# Patient Record
Sex: FEMALE | Race: WHITE | NOT HISPANIC OR LATINO | Employment: OTHER | URBAN - METROPOLITAN AREA
[De-identification: names, ages, dates, MRNs, and addresses within clinical notes are randomized per-mention and may not be internally consistent; named-entity substitution may affect disease eponyms.]

---

## 2018-02-28 PROBLEM — F29 PSYCHOSIS: Status: ACTIVE | Noted: 2018-02-28

## 2018-03-01 PROBLEM — K04.7 DENTAL ABSCESS: Status: ACTIVE | Noted: 2018-03-01

## 2018-03-01 PROBLEM — N39.0 UTI (URINARY TRACT INFECTION): Status: ACTIVE | Noted: 2018-03-01

## 2018-03-07 PROBLEM — I95.1 ORTHOSTATIC HYPOTENSION: Status: ACTIVE | Noted: 2018-03-07

## 2018-04-25 ENCOUNTER — OFFICE VISIT (OUTPATIENT)
Dept: INTERNAL MEDICINE | Facility: CLINIC | Age: 43
End: 2018-04-25
Payer: MEDICARE

## 2018-04-25 VITALS
TEMPERATURE: 99 F | WEIGHT: 167 LBS | BODY MASS INDEX: 28.51 KG/M2 | DIASTOLIC BLOOD PRESSURE: 64 MMHG | SYSTOLIC BLOOD PRESSURE: 100 MMHG | HEIGHT: 64 IN | OXYGEN SATURATION: 95 % | HEART RATE: 96 BPM

## 2018-04-25 DIAGNOSIS — F41.9 ANXIETY AND DEPRESSION: Primary | ICD-10-CM

## 2018-04-25 DIAGNOSIS — F32.A ANXIETY AND DEPRESSION: Primary | ICD-10-CM

## 2018-04-25 PROCEDURE — 99202 OFFICE O/P NEW SF 15 MIN: CPT | Mod: ,,, | Performed by: INTERNAL MEDICINE

## 2018-04-25 RX ORDER — OLANZAPINE 10 MG/1
10 TABLET ORAL NIGHTLY
COMMUNITY
End: 2018-04-25 | Stop reason: ALTCHOICE

## 2018-04-25 RX ORDER — OMEPRAZOLE 40 MG/1
40 CAPSULE, DELAYED RELEASE ORAL DAILY
COMMUNITY
End: 2018-05-08 | Stop reason: ALTCHOICE

## 2018-04-25 RX ORDER — RISPERIDONE 3 MG/1
TABLET ORAL
Refills: 0 | COMMUNITY
Start: 2018-03-21 | End: 2018-04-25 | Stop reason: SDUPTHER

## 2018-04-25 RX ORDER — RISPERIDONE 3 MG/1
3 TABLET ORAL NIGHTLY
Qty: 30 TABLET | Refills: 1 | Status: SHIPPED | OUTPATIENT
Start: 2018-04-25 | End: 2018-05-08 | Stop reason: HOSPADM

## 2018-04-25 RX ORDER — TRAZODONE HYDROCHLORIDE 150 MG/1
150 TABLET ORAL NIGHTLY
COMMUNITY
End: 2018-04-25 | Stop reason: ALTCHOICE

## 2018-04-25 RX ORDER — RISPERIDONE 2 MG/1
2 TABLET ORAL DAILY
Qty: 30 TABLET | Refills: 1 | Status: SHIPPED | OUTPATIENT
Start: 2018-04-25 | End: 2018-05-08 | Stop reason: HOSPADM

## 2018-04-25 RX ORDER — DULOXETIN HYDROCHLORIDE 60 MG/1
60 CAPSULE, DELAYED RELEASE ORAL DAILY
Qty: 30 CAPSULE | Refills: 1 | Status: SHIPPED | OUTPATIENT
Start: 2018-04-25 | End: 2019-04-25

## 2018-04-25 RX ORDER — RISPERIDONE 2 MG/1
TABLET ORAL
Refills: 0 | COMMUNITY
Start: 2018-03-21 | End: 2018-04-25 | Stop reason: SDUPTHER

## 2018-04-25 RX ORDER — CLONAZEPAM 0.5 MG/1
0.5 TABLET ORAL 2 TIMES DAILY PRN
COMMUNITY
End: 2018-04-25 | Stop reason: ALTCHOICE

## 2018-04-25 NOTE — PROGRESS NOTES
Subjective:       Patient ID: Nancy Wild is a 43 y.o. female.    Chief Complaint: Establish Care (new patient establishment); Depression (med refill patient requesting abx for teeth pain); Anxiety; and Insomnia    Here to establish care.  She has a h/o multiple psychiatric diagnoses in the past include schizophrenia, bipolar, anxiety/depression, personality disorder.  She was admitted via [facility] at the end of February after presenting to [facility] requesting refills on klonopin and trazodone and expressing suicidal ideation.  Given a diagnosis of psychosis at that time and discharged with Rx for risperdal and instructions to f/u at [facility].  However, she was in  The middle of a move and lost her paperwork.  She went there yesterday and was given an appointment for next month but told to get refills from PCP until then.  She presents here asking for refills on klonopin and trazodone.  She has bottles with her for zyprexa, omeprazole, risperdone.   She reports she continued to take trazodone 150mg until she ran out 2 days ago.  Her discharge orders from February specifically mention stopping the trazodone and klonopin but make no mention of zyprexa (unclear if they were aware of it).  She denies ever taking any SNRIs but thinks she was on celexa and had a reaction to it.   Denies SI today.  Feels very anxious, can't sit still.  PHQ9 18, JULIET 17.      Review of Systems   Constitutional: Positive for unexpected weight change. Negative for chills, fatigue and fever (weight gain).   HENT: Positive for congestion. Negative for hearing loss, postnasal drip, rhinorrhea, trouble swallowing and voice change.    Eyes: Negative for photophobia and visual disturbance.   Respiratory: Positive for wheezing. Negative for apnea, cough, choking, chest tightness and shortness of breath.    Cardiovascular: Negative for chest pain, palpitations and leg swelling.   Gastrointestinal: Negative for abdominal pain, blood in  stool, constipation, diarrhea, nausea, rectal pain and vomiting.        Acid reflux   Endocrine: Negative for cold intolerance, heat intolerance, polydipsia and polyuria.   Genitourinary: Negative for decreased urine volume, difficulty urinating, dysuria, frequency, genital sores, hematuria, menstrual problem, pelvic pain, urgency, vaginal bleeding and vaginal discharge.   Musculoskeletal: Positive for myalgias, neck pain and neck stiffness. Negative for arthralgias, back pain, gait problem and joint swelling.   Skin: Negative for color change, rash and wound.   Allergic/Immunologic: Negative for environmental allergies and food allergies.   Neurological: Positive for tremors and headaches. Negative for dizziness, seizures, syncope, facial asymmetry, speech difficulty, weakness, light-headedness and numbness.   Hematological: Negative for adenopathy. Does not bruise/bleed easily.   Psychiatric/Behavioral: Positive for agitation, decreased concentration, depression, hallucinations and sleep disturbance (when out of medications). Negative for confusion and suicidal ideas. The patient is nervous/anxious.        Past Medical History:   Diagnosis Date    Anxiety     Bipolar disorder     Borderline personality disorder     Depression     H/O abdominal hysterectomy     H/O foot surgery     Hallucination     Headache     Hx of psychiatric care     Psychiatric exam requested by authority     Psychiatric problem     Schizoaffective disorder     Suicide attempt     Therapy       Past Surgical History:   Procedure Laterality Date     SECTION      HYSTERECTOMY      TUBAL LIGATION         Family History   Problem Relation Age of Onset    Anxiety disorder Sister     Tongue cancer Sister     Bipolar disorder Brother     Stomach cancer Mother     Prostate cancer Father        Social History     Social History    Marital status: Legally      Spouse name: N/A    Number of children: 3    Years  of education: N/A     Occupational History    disabled      Social History Main Topics    Smoking status: Current Every Day Smoker     Packs/day: 0.50     Years: 20.00     Types: Cigarettes    Smokeless tobacco: Never Used    Alcohol use Yes      Comment: occasional    Drug use: Yes     Types: Benzodiazepines      Comment: Pt reports being prescribed klonopin and xanax [place] psychiatrist in [place] (Dr. Campo) - no record of patient in     Sexual activity: Yes     Partners: Male     Birth control/ protection: See Surgical Hx     Other Topics Concern    None     Social History Narrative    Pt was born in [place], where she lived with her parents and twelve brothers and sisters. Pt is fifth oldest. She denies h/o childhood physical, verbal, sexual abuse. She moved to [place] with her  at age 17 (was given special permission by her parents to  that young). She completed hs in [place], and went on to complete some college in [place], and then some college in [place]. She and her  had three children. She and her  legally  about 10 years ago. She moved to [place] 2-3 weeks ago to live with her boyfriend - she did not plan this move very well, as she had no psychiatric medications and had not made plans to establish treatment in [place] before leaving [place] (where she had been seeing an outpatient psychiatrist for 5-6 years). She has had numerous deaths in her family due to cancer, and most recently lost her sister Jennifer (2 months ago). She is currently disabled. 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        Current Outpatient Prescriptions   Medication Sig Dispense Refill    omeprazole (PRILOSEC) 40 MG capsule Take 40 mg by mouth once daily.      DULoxetine (CYMBALTA) 60 MG capsule Take 1 capsule (60 mg total) by mouth once daily. 30 capsule 1    risperiDONE (RISPERDAL) 2 MG tablet Take 1 tablet (2 mg total)  "by mouth once daily. 30 tablet 1    risperiDONE (RISPERDAL) 3 MG Tab Take 1 tablet (3 mg total) by mouth every evening. 30 tablet 1     No current facility-administered medications for this visit.        Review of patient's allergies indicates:   Allergen Reactions    Amoxicillin      Eye and throat swelling    Celexa [citalopram] Other (See Comments)     Facial flushing    Penicillins Swelling     Eye and throat swelling     Clindamycin Other (See Comments)     Eye and throat swelling      Objective:    HPI     Establish Care    Additional comments: new patient establishment           Depression    Additional comments: med refill patient requesting abx for teeth pain       Last edited by Mae Jason MA on 4/25/2018  9:52 AM. (History)      Blood pressure 100/64, pulse 96, temperature 98.5 °F (36.9 °C), temperature source Temporal, height 5' 4" (1.626 m), weight 75.8 kg (167 lb), SpO2 95 %. Body mass index is 28.67 kg/m².   Physical Exam   Constitutional: She appears well-developed. No distress.   HENT:   Nose: Nose normal.   Mouth/Throat: Oropharynx is clear and moist.   Eyes: Conjunctivae are normal. Right eye exhibits no discharge. Left eye exhibits no discharge. No scleral icterus.   Neck: Carotid bruit is not present.   Cardiovascular: Normal rate, regular rhythm and normal heart sounds.    No murmur heard.  Pulmonary/Chest: Effort normal and breath sounds normal. No respiratory distress. She has no decreased breath sounds. She has no wheezes. She has no rhonchi. She has no rales.   Abdominal: Soft. She exhibits no distension. There is no tenderness. There is no rebound and no guarding.   Musculoskeletal: She exhibits no edema.   Neurological: She is alert. She displays no tremor.   Skin: Skin is warm and dry.   Psychiatric: Her speech is normal. Thought content normal. Her mood appears anxious. She is agitated and hyperactive (constantly pacing or otherwise in motion).   Nursing note and vitals " reviewed.          Assessment:       1. Anxiety and depression        Plan:       Nancy was seen today for establish care, depression, anxiety and insomnia.    Diagnoses and all orders for this visit:    Anxiety and depression  Comments:  D/C zyprexa and trazodone.  Will refill risperdal and add cymbalta.  To ER if any suicidal ideation.    Orders:  -     risperiDONE (RISPERDAL) 2 MG tablet; Take 1 tablet (2 mg total) by mouth once daily.  -     risperiDONE (RISPERDAL) 3 MG Tab; Take 1 tablet (3 mg total) by mouth every evening.  -     DULoxetine (CYMBALTA) 60 MG capsule; Take 1 capsule (60 mg total) by mouth once daily.

## 2018-04-26 RX ORDER — CEFDINIR 300 MG/1
300 CAPSULE ORAL 2 TIMES DAILY
Qty: 20 CAPSULE | Refills: 0 | Status: SHIPPED | OUTPATIENT
Start: 2018-04-26 | End: 2018-05-06

## 2018-05-08 ENCOUNTER — OFFICE VISIT (OUTPATIENT)
Dept: INTERNAL MEDICINE | Facility: CLINIC | Age: 43
End: 2018-05-08
Payer: MEDICARE

## 2018-05-08 VITALS
RESPIRATION RATE: 18 BRPM | SYSTOLIC BLOOD PRESSURE: 130 MMHG | DIASTOLIC BLOOD PRESSURE: 84 MMHG | HEIGHT: 64 IN | OXYGEN SATURATION: 96 % | BODY MASS INDEX: 28.75 KG/M2 | WEIGHT: 168.38 LBS | HEART RATE: 90 BPM | TEMPERATURE: 96 F

## 2018-05-08 DIAGNOSIS — S93.412A SPRAIN OF CALCANEOFIBULAR LIGAMENT OF LEFT ANKLE, INITIAL ENCOUNTER: ICD-10-CM

## 2018-05-08 DIAGNOSIS — K21.9 GASTROESOPHAGEAL REFLUX DISEASE WITHOUT ESOPHAGITIS: ICD-10-CM

## 2018-05-08 DIAGNOSIS — T50.905D MEDICATION SIDE EFFECT, SUBSEQUENT ENCOUNTER: Primary | ICD-10-CM

## 2018-05-08 DIAGNOSIS — F41.9 ANXIETY AND DEPRESSION: ICD-10-CM

## 2018-05-08 DIAGNOSIS — F32.A ANXIETY AND DEPRESSION: ICD-10-CM

## 2018-05-08 PROBLEM — F31.9 BIPOLAR DISORDER: Status: ACTIVE | Noted: 2018-05-08

## 2018-05-08 PROBLEM — M25.572 PAIN IN LEFT ANKLE: Status: ACTIVE | Noted: 2018-05-08

## 2018-05-08 PROBLEM — Z87.11 HISTORY OF PEPTIC ULCER DISEASE: Status: ACTIVE | Noted: 2018-05-08

## 2018-05-08 PROBLEM — M79.672 PAIN IN LEFT FOOT: Status: ACTIVE | Noted: 2018-05-08

## 2018-05-08 PROBLEM — M54.50 LOW BACK PAIN: Status: ACTIVE | Noted: 2018-05-08

## 2018-05-08 PROBLEM — K59.09 CHRONIC CONSTIPATION: Status: ACTIVE | Noted: 2018-05-08

## 2018-05-08 PROBLEM — K02.9 DENTAL CARIES, UNSPECIFIED: Status: ACTIVE | Noted: 2018-05-08

## 2018-05-08 PROBLEM — N64.4 MASTODYNIA: Status: ACTIVE | Noted: 2018-05-08

## 2018-05-08 PROCEDURE — 99213 OFFICE O/P EST LOW 20 MIN: CPT | Mod: ,,, | Performed by: INTERNAL MEDICINE

## 2018-05-08 RX ORDER — CLONAZEPAM 0.5 MG/1
1 TABLET ORAL 2 TIMES DAILY
COMMUNITY

## 2018-05-08 RX ORDER — TRAZODONE HYDROCHLORIDE 150 MG/1
150 TABLET ORAL NIGHTLY
COMMUNITY

## 2018-05-08 RX ORDER — OLANZAPINE 10 MG/1
10 TABLET ORAL NIGHTLY
COMMUNITY

## 2018-05-08 RX ORDER — BENZTROPINE MESYLATE 1 MG/1
1 TABLET ORAL 2 TIMES DAILY
Refills: 0 | COMMUNITY
Start: 2018-05-03 | End: 2018-05-08 | Stop reason: SDUPTHER

## 2018-05-08 RX ORDER — DIPHENHYDRAMINE HCL 25 MG
25 CAPSULE ORAL EVERY 6 HOURS PRN
COMMUNITY

## 2018-05-08 RX ORDER — BENZTROPINE MESYLATE 1 MG/1
1 TABLET ORAL 2 TIMES DAILY
Qty: 60 TABLET | Refills: 1 | Status: SHIPPED | OUTPATIENT
Start: 2018-05-08

## 2018-05-08 NOTE — PROGRESS NOTES
Subjective:       Patient ID: Nancy Wild is a 43 y.o. female.    Chief Complaint: Hospital Follow Up (medication side effect) and Foot Pain (left foot)    Here for ER followup.  She reports that she started having some involuntary movements of her arms which she felt was due to the risperdal.  Symptoms would be worse for several hours after taking the medication and seemed better if she skipped a dose.  They gave her benadryl which did seem to help.  Given a Rx for cogentin on discharge but it wasn't made clear to her that she was supposed to stop the risperdal so she has been taking both.  Still having the shaking (actually worse with both meds) but the benadryl does seem to help.   Left ankle has been bothering her last several days.  She reports she stepped off the sidewalk wrong and inverted ankle.  Prior surgery on that ankle.       Review of Systems   Constitutional: Negative for activity change and appetite change.   HENT: Negative for congestion, ear discharge, ear pain, sinus pain and sinus pressure.    Eyes: Positive for visual disturbance. Negative for discharge and itching.   Respiratory: Negative for choking, chest tightness and shortness of breath.    Cardiovascular: Negative for chest pain.   Gastrointestinal: Negative for constipation, diarrhea, nausea and vomiting.   Endocrine: Positive for cold intolerance and heat intolerance.   Genitourinary: Positive for frequency and urgency. Negative for difficulty urinating and pelvic pain.   Musculoskeletal: Positive for joint swelling and neck pain. Negative for back pain and neck stiffness.   Skin: Negative for rash and wound.   Allergic/Immunologic: Positive for environmental allergies. Negative for food allergies.   Neurological: Positive for dizziness. Negative for light-headedness and numbness.   Hematological: Does not bruise/bleed easily.   Psychiatric/Behavioral: Negative for behavioral problems, confusion, decreased concentration,  hallucinations, self-injury and suicidal ideas. The patient is nervous/anxious.        Past Medical History:   Diagnosis Date    Anxiety     Bipolar disorder     Borderline personality disorder     Depression     Gastroesophageal reflux disease without esophagitis 2018    H/O abdominal hysterectomy     H/O foot surgery     Hallucination     Headache     Hx of psychiatric care     Psychiatric exam requested by authority     Psychiatric problem     Schizoaffective disorder     Suicide attempt     Therapy       Past Surgical History:   Procedure Laterality Date     SECTION      HYSTERECTOMY      TUBAL LIGATION         Family History   Problem Relation Age of Onset    Anxiety disorder Sister     Tongue cancer Sister     Bipolar disorder Brother     Stomach cancer Mother     Prostate cancer Father        Social History     Social History    Marital status: Legally      Spouse name: N/A    Number of children: 3    Years of education: N/A     Occupational History    disabled      Social History Main Topics    Smoking status: Current Every Day Smoker     Packs/day: 0.50     Years: 20.00     Types: Cigarettes    Smokeless tobacco: Never Used    Alcohol use Yes      Comment: occasional    Drug use: Yes     Types: Benzodiazepines      Comment: Pt reports being prescribed klonopin and xanax by outpatient psychiatrist in [place] for [place] (Dr. Campo) - no record of patient in     Sexual activity: Yes     Partners: Male     Birth control/ protection: See Surgical Hx     Other Topics Concern    None     Social History Narrative    Pt was born in [place], where she lived with her parents and twelve brothers and sisters. Pt is fifth oldest. She denies h/o childhood physical, verbal, sexual abuse. She moved to [place] with her  at age 17 (was given special permission by her parents to  that young). She completed hs in [place], and went on to complete some  college in [place], and then some college in [place]. She and her  had three children. She and her  legally  about 10 years ago. She moved to [place] 2-3 weeks ago to live with her boyfriend - she did not plan this move very well, as she had no psychiatric medications and had not made plans to establish treatment in [place] before leaving [place] (where she had been seeing an outpatient psychiatrist for 5-6 years). She has had numerous deaths in her family due to cancer, and most recently lost her sister Jennifer (2 months ago). She is currently disabled. 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        Current Outpatient Prescriptions   Medication Sig Dispense Refill    benztropine (COGENTIN) 1 MG tablet Take 1 tablet (1 mg total) by mouth 2 (two) times daily. 60 tablet 1    clonazePAM (KLONOPIN) 0.5 MG tablet Take 1 tablet by mouth 2 (two) times daily.      diphenhydrAMINE (BENADRYL) 25 mg capsule Take 25 mg by mouth every 6 (six) hours as needed for Itching.      DULoxetine (CYMBALTA) 60 MG capsule Take 1 capsule (60 mg total) by mouth once daily. 30 capsule 1    OLANZapine (ZYPREXA) 10 MG tablet Take 10 mg by mouth every evening.      traZODone (DESYREL) 150 MG tablet Take 150 mg by mouth every evening.      ranitidine (ZANTAC) 150 MG tablet Take 1 tablet (150 mg total) by mouth 2 (two) times daily. 60 tablet 3     No current facility-administered medications for this visit.        Review of patient's allergies indicates:   Allergen Reactions    Amoxicillin      Eye and throat swelling    Celexa [citalopram] Other (See Comments)     Facial flushing    Penicillins Swelling     Eye and throat swelling     Clindamycin Other (See Comments)     Eye and throat swelling      Objective:    HPI     Hospital Follow Up    Additional comments: medication side effect           Foot Pain    Additional comments: left foot       Last edited by Corinna Thompson MA  "on 5/8/2018  3:12 PM. (History)      Blood pressure 130/84, pulse 90, temperature (!) 95.8 °F (35.4 °C), temperature source Temporal, resp. rate 18, height 5' 4" (1.626 m), weight 76.4 kg (168 lb 6.4 oz), SpO2 96 %. Body mass index is 28.91 kg/m².   Physical Exam   Cardiovascular: Normal rate, regular rhythm and normal heart sounds.    Pulmonary/Chest: Effort normal and breath sounds normal.   Musculoskeletal:        Left ankle: She exhibits decreased range of motion and swelling. Tenderness. CF ligament tenderness found.   Neurological:   No abnormal motor movements noted at this time     Psychiatric: Her mood appears anxious.           Assessment:       1. Medication side effect, subsequent encounter    2. Anxiety and depression    3. Sprain of calcaneofibular ligament of left ankle, initial encounter    4. Gastroesophageal reflux disease without esophagitis        Plan:       Nancy was seen today for hospital follow up and foot pain.    Diagnoses and all orders for this visit:    Medication side effect, subsequent encounter  Comments:  Stop risperdal and continue cogentin. Benadryl or zyrtec as needed.      Anxiety and depression  Comments:  Has appt with Donn on the 29th  Orders:  -     benztropine (COGENTIN) 1 MG tablet; Take 1 tablet (1 mg total) by mouth 2 (two) times daily.    Sprain of calcaneofibular ligament of left ankle, initial encounter  Comments:  NSAIDs, ankle brace as needed      Gastroesophageal reflux disease without esophagitis  Comments:  Requesting refill on PPI.  Hasn't tried H2 blocker recently so will try that.    Other orders  -     ranitidine (ZANTAC) 150 MG tablet; Take 1 tablet (150 mg total) by mouth 2 (two) times daily.         "

## 2018-11-11 ENCOUNTER — HOSPITAL ENCOUNTER (EMERGENCY)
Facility: HOSPITAL | Age: 43
Discharge: HOME | End: 2018-11-12
Attending: FAMILY MEDICINE
Payer: MEDICARE

## 2018-11-11 VITALS
SYSTOLIC BLOOD PRESSURE: 108 MMHG | OXYGEN SATURATION: 96 % | RESPIRATION RATE: 16 BRPM | BODY MASS INDEX: 27.31 KG/M2 | HEIGHT: 64 IN | WEIGHT: 160 LBS | TEMPERATURE: 99 F | HEART RATE: 104 BPM | DIASTOLIC BLOOD PRESSURE: 84 MMHG

## 2018-11-11 DIAGNOSIS — F31.9 BIPOLAR AFFECTIVE DISORDER, REMISSION STATUS UNSPECIFIED: Primary | ICD-10-CM

## 2018-11-11 DIAGNOSIS — R41.82 ALTERED MENTAL STATUS: ICD-10-CM

## 2018-11-11 PROCEDURE — 71045 X-RAY EXAM CHEST 1 VIEW: CPT | Mod: 26,,, | Performed by: RADIOLOGY

## 2018-11-11 PROCEDURE — 71045 X-RAY EXAM CHEST 1 VIEW: CPT | Mod: TC,FY

## 2018-11-11 PROCEDURE — 85027 COMPLETE CBC AUTOMATED: CPT

## 2018-11-11 PROCEDURE — 80053 COMPREHEN METABOLIC PANEL: CPT

## 2018-11-11 PROCEDURE — 99285 EMERGENCY DEPT VISIT HI MDM: CPT | Mod: 25

## 2018-11-11 PROCEDURE — 85007 BL SMEAR W/DIFF WBC COUNT: CPT

## 2018-11-12 LAB
ALBUMIN SERPL BCP-MCNC: 4.4 G/DL
ALP SERPL-CCNC: 88 U/L
ALT SERPL W/O P-5'-P-CCNC: 19 U/L
AMPHET+METHAMPHET UR QL: NEGATIVE
ANION GAP SERPL CALC-SCNC: 10 MMOL/L
AST SERPL-CCNC: 18 U/L
B-HCG UR QL: NEGATIVE
BACTERIA #/AREA URNS HPF: ABNORMAL /HPF
BARBITURATES UR QL SCN>200 NG/ML: NEGATIVE
BASOPHILS NFR BLD: 0 %
BENZODIAZ UR QL SCN>200 NG/ML: NEGATIVE
BILIRUB SERPL-MCNC: 0.3 MG/DL
BILIRUB UR QL STRIP: ABNORMAL
BUN SERPL-MCNC: 14 MG/DL
BZE UR QL SCN: NEGATIVE
CALCIUM SERPL-MCNC: 9.3 MG/DL
CANNABINOIDS UR QL SCN: NEGATIVE
CHLORIDE SERPL-SCNC: 103 MMOL/L
CLARITY UR: CLEAR
CO2 SERPL-SCNC: 24 MMOL/L
COLOR UR: YELLOW
CREAT SERPL-MCNC: 0.7 MG/DL
CREAT UR-MCNC: 339.8 MG/DL
DIFFERENTIAL METHOD: ABNORMAL
EOSINOPHIL NFR BLD: 2 %
ERYTHROCYTE [DISTWIDTH] IN BLOOD BY AUTOMATED COUNT: 14.6 %
EST. GFR  (AFRICAN AMERICAN): >60 ML/MIN/1.73 M^2
EST. GFR  (NON AFRICAN AMERICAN): >60 ML/MIN/1.73 M^2
GLUCOSE SERPL-MCNC: 109 MG/DL
GLUCOSE UR QL STRIP: NEGATIVE
HCT VFR BLD AUTO: 46.3 %
HGB BLD-MCNC: 15.8 G/DL
HGB UR QL STRIP: NEGATIVE
HYALINE CASTS #/AREA URNS LPF: 0 /LPF
IMM GRANULOCYTES # BLD AUTO: ABNORMAL K/UL
IMM GRANULOCYTES NFR BLD AUTO: ABNORMAL %
KETONES UR QL STRIP: ABNORMAL
LEUKOCYTE ESTERASE UR QL STRIP: NEGATIVE
LYMPHOCYTES NFR BLD: 46 %
MCH RBC QN AUTO: 29.3 PG
MCHC RBC AUTO-ENTMCNC: 34.1 G/DL
MCV RBC AUTO: 86 FL
MICROSCOPIC COMMENT: ABNORMAL
MONOCYTES NFR BLD: 1 %
NEUTROPHILS NFR BLD: 51 %
NITRITE UR QL STRIP: NEGATIVE
NRBC BLD-RTO: 0 /100 WBC
OPIATES UR QL SCN: NEGATIVE
PCP UR QL SCN>25 NG/ML: NEGATIVE
PH UR STRIP: 6 [PH] (ref 5–8)
PLATELET # BLD AUTO: 280 K/UL
PMV BLD AUTO: 9.7 FL
POTASSIUM SERPL-SCNC: 3.2 MMOL/L
PROT SERPL-MCNC: 7.8 G/DL
PROT UR QL STRIP: ABNORMAL
RBC # BLD AUTO: 5.4 M/UL
RBC #/AREA URNS HPF: 3 /HPF (ref 0–4)
SODIUM SERPL-SCNC: 137 MMOL/L
SP GR UR STRIP: 1.02 (ref 1–1.03)
TOXICOLOGY INFORMATION: ABNORMAL
URN SPEC COLLECT METH UR: ABNORMAL
UROBILINOGEN UR STRIP-ACNC: 1 EU/DL
WBC # BLD AUTO: 12.41 K/UL
WBC #/AREA URNS HPF: 3 /HPF (ref 0–5)

## 2018-11-12 PROCEDURE — 81025 URINE PREGNANCY TEST: CPT

## 2018-11-12 PROCEDURE — 80307 DRUG TEST PRSMV CHEM ANLYZR: CPT

## 2018-11-12 PROCEDURE — 81000 URINALYSIS NONAUTO W/SCOPE: CPT | Mod: 59

## 2018-11-12 PROCEDURE — 25000003 PHARM REV CODE 250: Performed by: FAMILY MEDICINE

## 2018-11-12 RX ORDER — POTASSIUM CHLORIDE 20 MEQ/1
20 TABLET, EXTENDED RELEASE ORAL
Status: COMPLETED | OUTPATIENT
Start: 2018-11-12 | End: 2018-11-12

## 2018-11-12 RX ORDER — OLANZAPINE 5 MG/1
5 TABLET, ORALLY DISINTEGRATING ORAL
Status: COMPLETED | OUTPATIENT
Start: 2018-11-12 | End: 2018-11-12

## 2018-11-12 RX ADMIN — OLANZAPINE 5 MG: 5 TABLET, ORALLY DISINTEGRATING ORAL at 02:11

## 2018-11-12 RX ADMIN — POTASSIUM CHLORIDE 20 MEQ: 1500 TABLET, EXTENDED RELEASE ORAL at 02:11

## 2018-11-12 NOTE — ED TRIAGE NOTES
Pt arrived via ems after friends called reported patient was having increased confusion and talking to herself.  Pt has had a poor appetite.  Pt reports non-compliance with previous psychiatric medications.  Pt has a hx of schizoaffective disorder.

## 2018-11-12 NOTE — ED PROVIDER NOTES
Encounter Date: 2018       History     Chief Complaint   Patient presents with    Altered Mental Status     Patient arrived to ED by EMS, people she is staying with reported that she has had a gradual decline in her mental state over the last several days.  Patient does give a history of bipolar disorder for which she is unmedicated.  She denies any SI or HI.  Denies any auditory or visual hallucinations.          Review of patient's allergies indicates:   Allergen Reactions    Amoxicillin      Eye and throat swelling    Celexa [citalopram] Other (See Comments)     Facial flushing    Penicillins Swelling     Eye and throat swelling     Clindamycin Other (See Comments)     Eye and throat swelling      Past Medical History:   Diagnosis Date    Anxiety     Bipolar disorder     Borderline personality disorder     Depression     Gastroesophageal reflux disease without esophagitis 2018    H/O abdominal hysterectomy     H/O foot surgery     Hallucination     Headache     Hx of psychiatric care     Psychiatric exam requested by authority     Psychiatric problem     Schizoaffective disorder     Suicide attempt     Therapy      Past Surgical History:   Procedure Laterality Date     SECTION      HYSTERECTOMY      TUBAL LIGATION       Family History   Problem Relation Age of Onset    Anxiety disorder Sister     Tongue cancer Sister     Bipolar disorder Brother     Stomach cancer Mother     Prostate cancer Father      Social History     Tobacco Use    Smoking status: Current Every Day Smoker     Packs/day: 0.50     Years: 20.00     Pack years: 10.00     Types: Cigarettes    Smokeless tobacco: Never Used   Substance Use Topics    Alcohol use: Yes     Comment: occasional    Drug use: Yes     Types: Benzodiazepines     Comment: Pt reports being prescribed klonopin and xanax by outpatient psychiatrist in [place] (Dr. Campo) - no record of patient in      Review  of Systems   Constitutional: Negative.    HENT: Negative.    Eyes: Negative.    Respiratory: Negative.    Cardiovascular: Negative.    Gastrointestinal: Negative.    Endocrine: Negative.    Genitourinary: Negative.    Musculoskeletal: Negative.    Allergic/Immunologic: Negative.    Neurological: Negative.    Hematological: Negative.    Psychiatric/Behavioral: Positive for behavioral problems and dysphoric mood. Negative for confusion, hallucinations, self-injury, sleep disturbance and suicidal ideas. The patient is not nervous/anxious and is not hyperactive.        Physical Exam     Initial Vitals [11/11/18 2316]   BP Pulse Resp Temp SpO2   108/84 104 16 98.6 °F (37 °C) 96 %      MAP       --         Physical Exam    Nursing note and vitals reviewed.  Constitutional: She appears well-developed and well-nourished. She is not diaphoretic. No distress.   HENT:   Head: Normocephalic and atraumatic.   Eyes: Conjunctivae and EOM are normal. Pupils are equal, round, and reactive to light.   Neck: Normal range of motion. Neck supple.   Cardiovascular: Normal rate, regular rhythm, normal heart sounds and intact distal pulses. Exam reveals no gallop and no friction rub.    No murmur heard.  Pulmonary/Chest: Breath sounds normal. No respiratory distress. She has no wheezes. She has no rales.   Abdominal: Soft. Bowel sounds are normal. She exhibits no distension. There is no tenderness.   Musculoskeletal: Normal range of motion. She exhibits no edema.   Neurological: She is alert and oriented to person, place, and time. She has normal strength.   Skin: Skin is warm and dry. Capillary refill takes less than 2 seconds. No rash noted. No erythema.   Psychiatric: Judgment normal.   Flat affect, monotone speech.         ED Course   Procedures  Labs Reviewed   CBC W/ AUTO DIFFERENTIAL - Abnormal; Notable for the following components:       Result Value    RDW 14.6 (*)     Mono% 1.0 (*)     All other components within normal limits    COMPREHENSIVE METABOLIC PANEL - Abnormal; Notable for the following components:    Potassium 3.2 (*)     All other components within normal limits   PREGNANCY TEST, URINE RAPID   URINALYSIS, REFLEX TO URINE CULTURE   DRUG SCREEN PANEL, URINE EMERGENCY          Imaging Results          X-Ray Chest AP Portable (In process)                                       Clinical Impression:   The primary encounter diagnosis was Bipolar affective disorder, remission status unspecified. A diagnosis of Altered mental status was also pertinent to this visit.                             Marily Ashraf MD  11/12/18 9968

## 2018-11-12 NOTE — DISCHARGE INSTRUCTIONS
Resume medications as previously prescribed, follow up with primary care provider in mental health provider in the next 2-3 days.  Return to the ER at any time if condition changes or worsens.  Return to the ER at any time if he began to have feelings or thoughts of wanting to hurt herself or someone else.

## 2019-05-01 ENCOUNTER — TELEPHONE (OUTPATIENT)
Dept: FAMILY MEDICINE | Facility: CLINIC | Age: 44
End: 2019-05-01

## 2019-05-25 ENCOUNTER — HOSPITAL ENCOUNTER (EMERGENCY)
Age: 44
Discharge: HOME | End: 2019-05-26
Attending: EMERGENCY MEDICINE
Payer: MEDICARE

## 2019-05-25 DIAGNOSIS — R91.1 PULMONARY NODULE, RIGHT: ICD-10-CM

## 2019-05-25 DIAGNOSIS — M54.2 NECK PAIN: Primary | ICD-10-CM

## 2019-05-25 PROCEDURE — 99284 EMERGENCY DEPT VISIT MOD MDM: CPT

## 2019-05-26 ENCOUNTER — APPOINTMENT (OUTPATIENT)
Dept: GENERAL RADIOLOGY | Age: 44
End: 2019-05-26
Payer: MEDICARE

## 2019-05-26 ENCOUNTER — APPOINTMENT (OUTPATIENT)
Dept: CT IMAGING | Age: 44
End: 2019-05-26
Payer: MEDICARE

## 2019-05-26 VITALS
HEART RATE: 90 BPM | TEMPERATURE: 98.2 F | RESPIRATION RATE: 16 BRPM | HEIGHT: 61 IN | SYSTOLIC BLOOD PRESSURE: 105 MMHG | WEIGHT: 162 LBS | OXYGEN SATURATION: 97 % | BODY MASS INDEX: 30.58 KG/M2 | DIASTOLIC BLOOD PRESSURE: 64 MMHG

## 2019-05-26 PROCEDURE — 6370000000 HC RX 637 (ALT 250 FOR IP): Performed by: EMERGENCY MEDICINE

## 2019-05-26 PROCEDURE — 72040 X-RAY EXAM NECK SPINE 2-3 VW: CPT

## 2019-05-26 PROCEDURE — 72125 CT NECK SPINE W/O DYE: CPT

## 2019-05-26 RX ORDER — TRAZODONE HYDROCHLORIDE 150 MG/1
TABLET ORAL
COMMUNITY
End: 2019-08-01 | Stop reason: ALTCHOICE

## 2019-05-26 RX ORDER — IBUPROFEN 600 MG/1
600 TABLET ORAL
COMMUNITY
Start: 2019-05-23 | End: 2019-05-26

## 2019-05-26 RX ORDER — NAPROXEN 500 MG/1
500 TABLET ORAL 2 TIMES DAILY
Qty: 60 TABLET | Refills: 0 | Status: SHIPPED | OUTPATIENT
Start: 2019-05-26 | End: 2019-08-01 | Stop reason: ALTCHOICE

## 2019-05-26 RX ORDER — NAPROXEN 250 MG/1
500 TABLET ORAL ONCE
Status: COMPLETED | OUTPATIENT
Start: 2019-05-26 | End: 2019-05-26

## 2019-05-26 RX ORDER — CLONAZEPAM 0.5 MG/1
TABLET ORAL
COMMUNITY
End: 2019-08-01 | Stop reason: ALTCHOICE

## 2019-05-26 RX ORDER — NAPROXEN 500 MG/1
500 TABLET ORAL 2 TIMES DAILY
Qty: 60 TABLET | Refills: 0 | Status: SHIPPED | OUTPATIENT
Start: 2019-05-26 | End: 2019-05-26 | Stop reason: SDUPTHER

## 2019-05-26 RX ORDER — OMEPRAZOLE 40 MG/1
CAPSULE, DELAYED RELEASE ORAL 2 TIMES DAILY
COMMUNITY
Start: 2017-10-26 | End: 2021-09-23 | Stop reason: SDUPTHER

## 2019-05-26 RX ORDER — OLANZAPINE 10 MG/1
TABLET ORAL
COMMUNITY
End: 2019-05-26

## 2019-05-26 RX ORDER — TIZANIDINE 2 MG/1
2 TABLET ORAL
COMMUNITY
Start: 2019-05-23 | End: 2019-08-01

## 2019-05-26 RX ADMIN — NAPROXEN 500 MG: 250 TABLET ORAL at 01:45

## 2019-05-26 ASSESSMENT — ENCOUNTER SYMPTOMS
RESPIRATORY NEGATIVE: 1
EYES NEGATIVE: 1
GASTROINTESTINAL NEGATIVE: 1

## 2019-05-26 ASSESSMENT — PAIN SCALES - GENERAL
PAINLEVEL_OUTOF10: 7
PAINLEVEL_OUTOF10: 7
PAINLEVEL_OUTOF10: 8
PAINLEVEL_OUTOF10: 8

## 2019-05-26 ASSESSMENT — PAIN DESCRIPTION - ORIENTATION: ORIENTATION: LEFT

## 2019-05-26 ASSESSMENT — PAIN - FUNCTIONAL ASSESSMENT
PAIN_FUNCTIONAL_ASSESSMENT: ACTIVITIES ARE NOT PREVENTED
PAIN_FUNCTIONAL_ASSESSMENT: ACTIVITIES ARE NOT PREVENTED
PAIN_FUNCTIONAL_ASSESSMENT: 0-10
PAIN_FUNCTIONAL_ASSESSMENT: ACTIVITIES ARE NOT PREVENTED

## 2019-05-26 ASSESSMENT — PAIN DESCRIPTION - ONSET
ONSET: ON-GOING

## 2019-05-26 ASSESSMENT — PAIN DESCRIPTION - LOCATION
LOCATION: ANKLE;NECK
LOCATION: NECK;BACK
LOCATION: NECK;BACK

## 2019-05-26 ASSESSMENT — PAIN DESCRIPTION - PAIN TYPE
TYPE: CHRONIC PAIN;ACUTE PAIN
TYPE: CHRONIC PAIN;ACUTE PAIN
TYPE: CHRONIC PAIN

## 2019-05-26 ASSESSMENT — PAIN DESCRIPTION - DESCRIPTORS
DESCRIPTORS: ACHING
DESCRIPTORS: ACHING;DISCOMFORT
DESCRIPTORS: ACHING;DISCOMFORT

## 2019-05-26 ASSESSMENT — PAIN DESCRIPTION - PROGRESSION
CLINICAL_PROGRESSION: GRADUALLY IMPROVING
CLINICAL_PROGRESSION: GRADUALLY IMPROVING

## 2019-05-26 NOTE — ED NOTES
Pt has bilat ankle abrasions posterior from wearing new pair of \"boots\" and walking in them for 3 days. PSO applied with bandages to areas.      Sherly Anderson RN  05/26/19 0014

## 2019-05-26 NOTE — ED NOTES
The patient has many requests. ..she wants a copy of her medical records, she wants a statement of all her medical diagnosis and she wants a cab voucher. The pt was instructed to follow up with medical records and pt was given bus pass.       Miguel Angel Ryder RN  05/26/19 6536

## 2019-05-26 NOTE — ED PROVIDER NOTES
[address] [facility]  eMERGENCY dEPARTMENT eNCOUnter      Pt Name: Anatoliy Holliday  MRN: [id number]  [place] 1975  Date of evaluation: 5/25/2019  Provider: Twan Samaniego MD    CHIEF COMPLAINT       Chief Complaint   Patient presents with    Neck Pain     Pt reportedly seen recently for back pain in [address] [place] [address] [address]. She received muscle relaxers that she did not get filled. The homeless shelter in [place] was then filled so she has been walking about the city. Pt states now the back pain has moved up to her neck and she may have re-injured her left ankle. No swelling noted. Pt does          HISTORY OF PRESENT ILLNESS   (Location/Symptom, Timing/Onset,Context/Setting, Quality, Duration, Modifying Factors, Severity)  Note limiting factors. HPI: Anatoliy Holliday is a 45 y.o. female, with hx of homelessness and psych history, who presents to the emergency department with concern for neck pain and feet pain. Patient states that she has pain at the base of her neck, that is both muscular and skeletal.  Denies any specific trauma but notes pain, focal area of concern, but does note some blisters over her heel. Patient attempted to state home a shelter tonight and was told that she would need medical authorization to do so.  specifically over the C6 vertebrae and with the left paraspinal muscles. She describes it as achy, worse with \"her heavy backpack\" and without any relieving factors. Patient also notes that her feet are generally sore, but denies any specific injury. Patient has some blisters over her heel, but no rash, bruising, or acute injury. Patient attempted to find a homeless shelter tonight, but was told she needed \"medical clearance. \"  Patient has had prior history on her left ankle and she expresses concern for pain there related to [place] of walking. \"  Patient denies any injury and has no change to her gait. NursingNotes were reviewed.     REVIEW OF SYSTEMS    (2-9 systems for level 4, 10 or more for level 5)     Review of Systems   Constitutional: Positive for fatigue. Negative for chills and fever. HENT: Negative. Eyes: Negative. Respiratory: Negative. Cardiovascular: Negative. Gastrointestinal: Negative. Genitourinary: Negative. Musculoskeletal: Positive for arthralgias and myalgias. Negative for gait problem. Skin: Negative. Neurological: Negative. Psychiatric/Behavioral: Negative. Except as noted above the remainder of the review of systems was reviewed and negative. PAST MEDICAL HISTORY     Past Medical History:   Diagnosis Date    CRPS (complex regional pain syndrome type II)          SURGICALHISTORY       Past Surgical History:   Procedure Laterality Date     SECTION           CURRENT MEDICATIONS       Previous Medications    CLONAZEPAM (KLONOPIN) 0.5 MG TABLET    Take by mouth. OMEPRAZOLE (PRILOSEC) 40 MG DELAYED RELEASE CAPSULE    take 1 capsule by oral route every day before a meal    TIZANIDINE (ZANAFLEX) 2 MG TABLET    Take 2 mg by mouth    TRAZODONE (DESYREL) 150 MG TABLET    take 1 tablet by oral route  at bedtime       ALLERGIES     Clindamycin/lincomycin;  Penicillins; Citalopram hydrobromide; and Clavulanic acid    FAMILY HISTORY       Family History   Problem Relation Age of Onset    Cancer Mother     Cancer Father     Heart Disease Brother        SOCIAL HISTORY       Social History     Socioeconomic History    Marital status:      Spouse name: None    Number of children: None    Years of education: None    Highest education level: None   Occupational History    None   Social Needs    Financial resource strain: None    Food insecurity:     Worry: None     Inability: None    Transportation needs:     Medical: None     Non-medical: None   Tobacco Use    Smoking status: Current Every Day Smoker     Packs/day: 1.00     Types: Cigarettes    Smokeless tobacco: Never Used   Substance and Sexual Activity    Alcohol use: No    Drug use: No    Sexual activity: Not Currently   Lifestyle    Physical activity:     Days per week: None     Minutes per session: None    Stress: None   Relationships    Social connections:     Talks on phone: None     Gets together: None     Attends Spiritism service: None     Active member of club or organization: None     Attends meetings of clubs or organizations: None     Relationship status: None    Intimate partner violence:     Fear of current or ex partner: None     Emotionally abused: None     Physically abused: None     Forced sexual activity: None   Other Topics Concern    None   Social History Narrative    None       SCREENINGS             PHYSICAL EXAM    (up to 7 for level 4, 8 or more for level 5)     ED Triage Vitals [05/26/19 0055]   BP Temp Temp Source Pulse Resp SpO2 Height Weight   95/60 98.2 °F (36.8 °C) Oral 90 16 97 % 5' 1\" (1.549 m) 162 lb (73.5 kg)       Physical Exam   Constitutional: She is oriented to person, place, and time. She appears well-developed and well-nourished. No distress. Patient is in no acute distress. HENT:   Head: Normocephalic and atraumatic. Nose: Nose normal.   Mouth/Throat: Oropharynx is clear and moist.   Eyes: EOM are normal.   Neck: Normal range of motion. Neck supple. Notes L paraspinal muscle and C6 TTP. Cardiovascular: Normal rate and regular rhythm. Pulmonary/Chest: Effort normal. No stridor. No respiratory distress. Abdominal: Soft. She exhibits no distension. There is no tenderness. Musculoskeletal:        Right ankle: Normal. Achilles tendon normal.        Left ankle: Achilles tendon normal.        Cervical back: She exhibits tenderness, bony tenderness, pain and spasm. She exhibits normal range of motion and normal pulse.         Back:         Legs:       Right foot: Normal.        Left foot: Normal.   Pt with palpable hardware to L lateral malleolus, but patient has normal gait, no sign of bruising, laceration or new injury, and no pain with dorsi or plantar flexion or concerning exam findings other than blisters noted below. Neurological: She is alert and oriented to person, place, and time. No cranial nerve deficit. She exhibits normal muscle tone. Coordination normal.   Skin: Skin is warm. Capillary refill takes less than 2 seconds. Psychiatric: She has a normal mood and affect. Her behavior is normal.   Nursing note and vitals reviewed. DIAGNOSTIC RESULTS     RADIOLOGY:   Non-plain filmimages such as CT, Ultrasound and MRI are read by the radiologist. Plain radiographic images are visualized and preliminarily interpreted by the emergency physician with the below findings:    Interpretation per the Radiologist below, if available at the time ofthis note:  [address]   Final Result   Degenerative change, without evidence of spondylolisthesis or gross fracture. 4 mm noncalcified right upper lobe pulmonary nodule, for which follow-up   recommendations are as below. RECOMMENDATIONS:   4.0 mm solid pulmonary nodule within the upper lobe. If patient is low risk   for malignancy, no routine follow-up imaging is recommended; if patient is   high risk for malignancy, a non-contrast Chest CT at 12 months is optional.   If performed and the nodule is stable at 12 months, no further follow-up is   recommended. These guidelines do not apply to patients younger than 35 years,   immunocompromised patients, and patients with cancer. Follow up in patients   with significant comorbidities as clinically warranted. For lung cancer   screening, adhere to Lung-RADS guidelines. Reference: Radiology. 2017;   284(1):228-43. XR CERVICAL SPINE (2-3 VIEWS)   Final Result   Ossific density at C4-C5 may be degenerative though given incomplete   visualization of the cervical spine and prevertebral soft tissue fullness,   traumatic injury cannot be excluded.   Cervical CT is recommended for further assessment. ED BEDSIDE ULTRASOUND:   Performed by ED Physician - none    LABS:  No results found for this visit on 05/25/19. Xr Cervical Spine (2-3 Views)    Result Date: 5/26/2019  EXAMINATION: 3 XRAY VIEWS OF THE CERVICAL SPINE 5/26/2019 1:31 am COMPARISON: None. HISTORY: ORDERING SYSTEM PROVIDED HISTORY: pain at base of C-spine TECHNOLOGIST PROVIDED HISTORY: Reason for exam:->pain at base of C-spine Ordering Physician Provided Reason for Exam: pain at base of neck nki Acuity: Acute Type of Exam: Initial FINDINGS: Three views were obtained of the cervical spine. On the lateral view, there is visualization through the C6 level. There is ossifications density between the anterior C4-C5 interspace on the lateral view. No spondylolisthesis. Mild thickening of prevertebral soft tissues superiorly. Tip of the dens is obscured on the odontoid view. No airspace disease at the lung apices. Ossific density at C4-C5 may be degenerative though given incomplete visualization of the cervical spine and prevertebral soft tissue fullness, traumatic injury cannot be excluded. Cervical CT is recommended for further assessment. Ct Cervical Spine Wo Contrast    Result Date: 5/26/2019  EXAMINATION: CT OF THE CERVICAL SPINE WITHOUT CONTRAST 5/26/2019 2:22 am TECHNIQUE: CT of the cervical spine was performed without the administration of intravenous contrast. Multiplanar reformatted images are provided for review. Dose modulation, iterative reconstruction, and/or weight based adjustment of the mA/kV was utilized to reduce the radiation dose to as low as reasonably achievable.  COMPARISON: Cervical radiograph dated 05/26/2019 HISTORY: ORDERING SYSTEM PROVIDED HISTORY: follow up based on abnormal Xray TECHNOLOGIST PROVIDED HISTORY: Ordering Physician Provided Reason for Exam: neck pain Acuity: Acute Type of Exam: Initial FINDINGS: BONES/ALIGNMENT: Multilevel disc space narrowing is seen with endplate osteophytosis, compatible with degenerative disc disease. There is spurring at C4-C5 which likely accounted for the radiographic finding. No marked vertebral body height loss. No spondylolisthesis. The facets are aligned. Ossific density at the left posterior margin of foramen magnum without clear donor site, likely developmental.  No gross fracture. DEGENERATIVE CHANGES: As above SOFT TISSUES: 4 mm noncalcified right apical pulmonary nodule. 6 mm hypodense right thyroid nodule, of the size were ultrasound surveillance is not mandatory. Degenerative change, without evidence of spondylolisthesis or gross fracture. 4 mm noncalcified right upper lobe pulmonary nodule, for which follow-up recommendations are as below. RECOMMENDATIONS: 4.0 mm solid pulmonary nodule within the upper lobe. If patient is low risk for malignancy, no routine follow-up imaging is recommended; if patient is high risk for malignancy, a non-contrast Chest CT at 12 months is optional. If performed and the nodule is stable at 12 months, no further follow-up is recommended. These guidelines do not apply to patients younger than 35 years, immunocompromised patients, and patients with cancer. Follow up in patients with significant comorbidities as clinically warranted. For lung cancer screening, adhere to Lung-RADS guidelines. Reference: Radiology. 2017; 284(1):228-43. All other labs were within normal range or not returned as of this dictation. EMERGENCY DEPARTMENT COURSE and DIFFERENTIAL DIAGNOSIS/MDM:   Vitals:    Vitals:    05/26/19 0055 05/26/19 0326   BP: 95/60 105/64   Pulse: 90    Resp: 16    Temp: 98.2 °F (36.8 °C)    TempSrc: Oral    SpO2: 97%    Weight: 162 lb (73.5 kg)    Height: 5' 1\" (1.549 m)        MDM: Workup is remarkable for some paraspinal neck pain, but no acute process on imaging of her cervical spine. Of note, a right apical nodule was seen.   Patient with no respiratory distress or symptoms at this time, but she is a former smoker. We discussed the importance of obtaining another CT within the next 12 months for reevaluation. This patient does not have a primary doctor in the area, she was given a referral for follow-up. I also wrote for Naprosyn for her diffuse neck and feet pain. Patient in no acute distress at any time, but has some fatigue related to recent travel by foot. Patient establish care with a primary doctor and return with any concern. CRITICAL CARE TIME   Total Critical Care time was 0 minutes, excluding separately reportable procedures. CONSULTS:  None    PROCEDURES:  Unless otherwise noted below, none     Procedures    FINAL IMPRESSION      1. Neck pain    2.  Pulmonary nodule, right          DISPOSITION/PLAN   DISPOSITION        PATIENT REFERRED TO:  [address] [facility]Services  [phone]    neck pain and pulmonary nodule    [address] [facility]  [facility] [phone]    If symptoms worsen      DISCHARGE MEDICATIONS:  New Prescriptions    NAPROXEN (NAPROSYN) 500 MG TABLET    Take 1 tablet by mouth 2 times daily          (Please note that portions of this note were completed with a voice recognition program.Efforts were made to edit the dictations but occasionally words are mis-transcribed.)    David Cheema MD (electronically signed)  Attending Emergency Physician        Camelia Lujan MD  05/26/19 6021

## 2019-07-16 ENCOUNTER — PATIENT MESSAGE (OUTPATIENT)
Dept: FAMILY MEDICINE | Facility: CLINIC | Age: 44
End: 2019-07-16

## 2019-07-16 ENCOUNTER — APPOINTMENT (OUTPATIENT)
Dept: CT IMAGING | Age: 44
End: 2019-07-16
Payer: MEDICARE

## 2019-07-16 ENCOUNTER — HOSPITAL ENCOUNTER (EMERGENCY)
Age: 44
Discharge: HOME | End: 2019-07-17
Attending: EMERGENCY MEDICINE
Payer: MEDICARE

## 2019-07-16 DIAGNOSIS — R10.33 PERIUMBILICAL ABDOMINAL PAIN: Primary | ICD-10-CM

## 2019-07-16 DIAGNOSIS — Z76.0 ENCOUNTER FOR MEDICATION REFILL: ICD-10-CM

## 2019-07-16 LAB
A/G RATIO: 1.5 (ref 1.1–2.2)
ALBUMIN SERPL-MCNC: 4.3 G/DL (ref 3.4–5)
ALP BLD-CCNC: 71 U/L (ref 40–129)
ALT SERPL-CCNC: 18 U/L (ref 10–40)
ANION GAP SERPL CALCULATED.3IONS-SCNC: 11 MMOL/L (ref 3–16)
AST SERPL-CCNC: 14 U/L (ref 15–37)
BACTERIA: ABNORMAL /HPF
BANDED NEUTROPHILS RELATIVE PERCENT: 2 % (ref 0–7)
BASOPHILS ABSOLUTE: 0 K/UL (ref 0–0.2)
BASOPHILS RELATIVE PERCENT: 0 %
BILIRUB SERPL-MCNC: <0.2 MG/DL (ref 0–1)
BILIRUBIN URINE: NEGATIVE
BLOOD, URINE: ABNORMAL
BUN BLDV-MCNC: 23 MG/DL (ref 7–20)
CALCIUM SERPL-MCNC: 10.6 MG/DL (ref 8.3–10.6)
CHLORIDE BLD-SCNC: 104 MMOL/L (ref 99–110)
CLARITY: ABNORMAL
CO2: 23 MMOL/L (ref 21–32)
COLOR: YELLOW
CREAT SERPL-MCNC: 0.8 MG/DL (ref 0.6–1.1)
EOSINOPHILS ABSOLUTE: 0.1 K/UL (ref 0–0.6)
EOSINOPHILS RELATIVE PERCENT: 1 %
EPITHELIAL CELLS, UA: ABNORMAL /HPF
GFR AFRICAN AMERICAN: >60
GFR NON-AFRICAN AMERICAN: >60
GLOBULIN: 2.8 G/DL
GLUCOSE BLD-MCNC: 126 MG/DL (ref 70–99)
GLUCOSE URINE: NEGATIVE MG/DL
HCG QUALITATIVE: NEGATIVE
HCT VFR BLD CALC: 42.6 % (ref 36–48)
HEMATOLOGY PATH CONSULT: YES
HEMOGLOBIN: 14.4 G/DL (ref 12–16)
KETONES, URINE: ABNORMAL MG/DL
LEUKOCYTE ESTERASE, URINE: NEGATIVE
LIPASE: 39 U/L (ref 13–60)
LYMPHOCYTES ABSOLUTE: 6 K/UL (ref 1–5.1)
LYMPHOCYTES RELATIVE PERCENT: 44 %
MCH RBC QN AUTO: 31.1 PG (ref 26–34)
MCHC RBC AUTO-ENTMCNC: 33.8 G/DL (ref 31–36)
MCV RBC AUTO: 92 FL (ref 80–100)
MICROSCOPIC EXAMINATION: YES
MONOCYTES ABSOLUTE: 0.1 K/UL (ref 0–1.3)
MONOCYTES RELATIVE PERCENT: 1 %
NEUTROPHILS ABSOLUTE: 7.4 K/UL (ref 1.7–7.7)
NEUTROPHILS RELATIVE PERCENT: 52 %
NITRITE, URINE: NEGATIVE
PDW BLD-RTO: 13.9 % (ref 12.4–15.4)
PH UA: 5.5 (ref 5–8)
PLATELET # BLD: 267 K/UL (ref 135–450)
PMV BLD AUTO: 8.2 FL (ref 5–10.5)
POTASSIUM SERPL-SCNC: 3.7 MMOL/L (ref 3.5–5.1)
PROTEIN UA: NEGATIVE MG/DL
RBC # BLD: 4.63 M/UL (ref 4–5.2)
RBC # BLD: NORMAL 10*6/UL
RBC UA: ABNORMAL /HPF (ref 0–2)
SODIUM BLD-SCNC: 138 MMOL/L (ref 136–145)
SPECIFIC GRAVITY UA: >=1.03 (ref 1–1.03)
TOTAL PROTEIN: 7.1 G/DL (ref 6.4–8.2)
URINE TYPE: ABNORMAL
UROBILINOGEN, URINE: 0.2 E.U./DL
WBC # BLD: 13.7 K/UL (ref 4–11)
WBC UA: ABNORMAL /HPF (ref 0–5)

## 2019-07-16 PROCEDURE — 84703 CHORIONIC GONADOTROPIN ASSAY: CPT

## 2019-07-16 PROCEDURE — 96375 TX/PRO/DX INJ NEW DRUG ADDON: CPT

## 2019-07-16 PROCEDURE — 85025 COMPLETE CBC W/AUTO DIFF WBC: CPT

## 2019-07-16 PROCEDURE — 2580000003 HC RX 258: Performed by: EMERGENCY MEDICINE

## 2019-07-16 PROCEDURE — 80053 COMPREHEN METABOLIC PANEL: CPT

## 2019-07-16 PROCEDURE — 96374 THER/PROPH/DIAG INJ IV PUSH: CPT

## 2019-07-16 PROCEDURE — 36415 COLL VENOUS BLD VENIPUNCTURE: CPT

## 2019-07-16 PROCEDURE — 6360000002 HC RX W HCPCS: Performed by: EMERGENCY MEDICINE

## 2019-07-16 PROCEDURE — 99284 EMERGENCY DEPT VISIT MOD MDM: CPT

## 2019-07-16 PROCEDURE — 81001 URINALYSIS AUTO W/SCOPE: CPT

## 2019-07-16 PROCEDURE — 83690 ASSAY OF LIPASE: CPT

## 2019-07-16 RX ORDER — LEVOFLOXACIN 750 MG/1
750 TABLET ORAL DAILY
COMMUNITY
End: 2019-08-01 | Stop reason: ALTCHOICE

## 2019-07-16 RX ORDER — CYCLOBENZAPRINE HCL 10 MG
10 TABLET ORAL 3 TIMES DAILY PRN
COMMUNITY
End: 2019-07-17 | Stop reason: SDUPTHER

## 2019-07-16 RX ORDER — MORPHINE SULFATE 4 MG/ML
4 INJECTION, SOLUTION INTRAMUSCULAR; INTRAVENOUS ONCE
Status: COMPLETED | OUTPATIENT
Start: 2019-07-16 | End: 2019-07-16

## 2019-07-16 RX ORDER — DIAZEPAM 5 MG/1
5 TABLET ORAL EVERY 6 HOURS PRN
COMMUNITY
End: 2019-08-01 | Stop reason: ALTCHOICE

## 2019-07-16 RX ORDER — 0.9 % SODIUM CHLORIDE 0.9 %
1000 INTRAVENOUS SOLUTION INTRAVENOUS ONCE
Status: COMPLETED | OUTPATIENT
Start: 2019-07-16 | End: 2019-07-17

## 2019-07-16 RX ORDER — ONDANSETRON 2 MG/ML
4 INJECTION INTRAMUSCULAR; INTRAVENOUS ONCE
Status: COMPLETED | OUTPATIENT
Start: 2019-07-16 | End: 2019-07-16

## 2019-07-16 RX ORDER — QUETIAPINE FUMARATE 100 MG/1
100 TABLET, FILM COATED ORAL 2 TIMES DAILY
COMMUNITY
End: 2019-09-15

## 2019-07-16 RX ORDER — METRONIDAZOLE 500 MG/1
500 TABLET ORAL 3 TIMES DAILY
COMMUNITY
End: 2019-08-01 | Stop reason: ALTCHOICE

## 2019-07-16 RX ORDER — HYDROXYZINE PAMOATE 25 MG/1
25 CAPSULE ORAL 3 TIMES DAILY PRN
COMMUNITY
End: 2019-07-17 | Stop reason: SDUPTHER

## 2019-07-16 RX ORDER — IBUPROFEN 400 MG/1
400 TABLET ORAL EVERY 6 HOURS PRN
COMMUNITY
End: 2019-08-01 | Stop reason: ALTCHOICE

## 2019-07-16 RX ADMIN — ONDANSETRON 4 MG: 2 INJECTION INTRAMUSCULAR; INTRAVENOUS at 23:35

## 2019-07-16 RX ADMIN — SODIUM CHLORIDE 1000 ML: 9 INJECTION, SOLUTION INTRAVENOUS at 23:35

## 2019-07-16 RX ADMIN — MORPHINE SULFATE 4 MG: 4 INJECTION, SOLUTION INTRAMUSCULAR; INTRAVENOUS at 23:35

## 2019-07-16 ASSESSMENT — ENCOUNTER SYMPTOMS
ABDOMINAL PAIN: 1
STRIDOR: 0
SORE THROAT: 0
TROUBLE SWALLOWING: 0
NAUSEA: 1
COUGH: 0
DIARRHEA: 0
WHEEZING: 0
RHINORRHEA: 0
SHORTNESS OF BREATH: 0
CHEST TIGHTNESS: 0
VOMITING: 0

## 2019-07-16 ASSESSMENT — PAIN DESCRIPTION - PAIN TYPE: TYPE: ACUTE PAIN

## 2019-07-16 ASSESSMENT — PAIN DESCRIPTION - ORIENTATION: ORIENTATION: MID;UPPER

## 2019-07-16 ASSESSMENT — PAIN SCALES - GENERAL
PAINLEVEL_OUTOF10: 10
PAINLEVEL_OUTOF10: 10

## 2019-07-16 ASSESSMENT — PAIN DESCRIPTION - LOCATION: LOCATION: ABDOMEN

## 2019-07-17 ENCOUNTER — APPOINTMENT (OUTPATIENT)
Dept: CT IMAGING | Age: 44
End: 2019-07-17
Payer: MEDICARE

## 2019-07-17 ENCOUNTER — PATIENT MESSAGE (OUTPATIENT)
Dept: FAMILY MEDICINE | Facility: CLINIC | Age: 44
End: 2019-07-17

## 2019-07-17 VITALS
HEIGHT: 64 IN | SYSTOLIC BLOOD PRESSURE: 124 MMHG | DIASTOLIC BLOOD PRESSURE: 78 MMHG | RESPIRATION RATE: 18 BRPM | HEART RATE: 82 BPM | WEIGHT: 170 LBS | BODY MASS INDEX: 29.02 KG/M2 | OXYGEN SATURATION: 100 % | TEMPERATURE: 98 F

## 2019-07-17 LAB
BACTERIA: ABNORMAL /HPF
BILIRUBIN URINE: NEGATIVE
BLOOD, URINE: ABNORMAL
CLARITY: CLEAR
COLOR: YELLOW
EPITHELIAL CELLS, UA: ABNORMAL /HPF
GLUCOSE URINE: NEGATIVE MG/DL
HEMATOLOGY PATH CONSULT: NORMAL
KETONES, URINE: ABNORMAL MG/DL
LEUKOCYTE ESTERASE, URINE: NEGATIVE
MICROSCOPIC EXAMINATION: YES
MUCUS: ABNORMAL /LPF
NITRITE, URINE: NEGATIVE
PH UA: 5.5 (ref 5–8)
PROTEIN UA: NEGATIVE MG/DL
RBC UA: ABNORMAL /HPF (ref 0–2)
SPECIFIC GRAVITY UA: >=1.03 (ref 1–1.03)
URINE TYPE: ABNORMAL
UROBILINOGEN, URINE: 0.2 E.U./DL
WBC UA: ABNORMAL /HPF (ref 0–5)

## 2019-07-17 PROCEDURE — 81001 URINALYSIS AUTO W/SCOPE: CPT

## 2019-07-17 PROCEDURE — 74177 CT ABD & PELVIS W/CONTRAST: CPT

## 2019-07-17 PROCEDURE — 6360000004 HC RX CONTRAST MEDICATION: Performed by: EMERGENCY MEDICINE

## 2019-07-17 RX ORDER — CYCLOBENZAPRINE HCL 10 MG
10 TABLET ORAL 3 TIMES DAILY PRN
Qty: 21 TABLET | Refills: 0 | Status: SHIPPED | OUTPATIENT
Start: 2019-07-17 | End: 2019-07-24

## 2019-07-17 RX ORDER — ALBUTEROL SULFATE 90 UG/1
2 AEROSOL, METERED RESPIRATORY (INHALATION) EVERY 6 HOURS PRN
Qty: 1 INHALER | Refills: 0 | Status: SHIPPED | OUTPATIENT
Start: 2019-07-17 | End: 2022-02-23 | Stop reason: SDUPTHER

## 2019-07-17 RX ORDER — HYDROXYZINE PAMOATE 25 MG/1
25 CAPSULE ORAL 3 TIMES DAILY PRN
Qty: 21 CAPSULE | Refills: 0 | Status: SHIPPED | OUTPATIENT
Start: 2019-07-17 | End: 2019-07-24

## 2019-07-17 RX ADMIN — IOPAMIDOL 75 ML: 755 INJECTION, SOLUTION INTRAVENOUS at 00:12

## 2019-08-01 ENCOUNTER — HOSPITAL ENCOUNTER (EMERGENCY)
Age: 44
Discharge: HOME | End: 2019-08-02
Attending: EMERGENCY MEDICINE
Payer: MEDICARE

## 2019-08-01 DIAGNOSIS — Z86.69 HISTORY OF TREMOR: ICD-10-CM

## 2019-08-01 DIAGNOSIS — M79.602 LEFT ARM PAIN: ICD-10-CM

## 2019-08-01 DIAGNOSIS — M54.16 LUMBAR RADICULOPATHY: ICD-10-CM

## 2019-08-01 DIAGNOSIS — K08.89 PAIN, DENTAL: ICD-10-CM

## 2019-08-01 DIAGNOSIS — Z86.59 HX OF BIPOLAR DISORDER: ICD-10-CM

## 2019-08-01 DIAGNOSIS — G90.50 COMPLEX REGIONAL PAIN SYNDROME TYPE 1, AFFECTING UNSPECIFIED SITE: Primary | ICD-10-CM

## 2019-08-01 DIAGNOSIS — M79.605 LEFT LEG PAIN: ICD-10-CM

## 2019-08-01 DIAGNOSIS — K02.9 DENTAL CARIES: ICD-10-CM

## 2019-08-01 PROCEDURE — 99283 EMERGENCY DEPT VISIT LOW MDM: CPT

## 2019-08-01 RX ORDER — ESCITALOPRAM OXALATE 20 MG/1
20 TABLET ORAL DAILY
COMMUNITY

## 2019-08-01 RX ORDER — CYCLOBENZAPRINE HCL 10 MG
10 TABLET ORAL 3 TIMES DAILY PRN
COMMUNITY
End: 2019-08-02

## 2019-08-01 RX ORDER — HYDROXYZINE HYDROCHLORIDE 25 MG/1
25 TABLET, FILM COATED ORAL 3 TIMES DAILY PRN
COMMUNITY
End: 2019-09-15

## 2019-08-01 ASSESSMENT — PAIN SCALES - GENERAL: PAINLEVEL_OUTOF10: 8

## 2019-08-01 ASSESSMENT — PAIN DESCRIPTION - LOCATION: LOCATION: ARM;LEG

## 2019-08-01 ASSESSMENT — PAIN DESCRIPTION - PAIN TYPE: TYPE: ACUTE PAIN

## 2019-08-01 ASSESSMENT — PAIN DESCRIPTION - ORIENTATION: ORIENTATION: LEFT

## 2019-08-02 VITALS
RESPIRATION RATE: 14 BRPM | SYSTOLIC BLOOD PRESSURE: 90 MMHG | HEART RATE: 82 BPM | OXYGEN SATURATION: 96 % | TEMPERATURE: 98.2 F | DIASTOLIC BLOOD PRESSURE: 61 MMHG

## 2019-08-02 PROCEDURE — 96372 THER/PROPH/DIAG INJ SC/IM: CPT

## 2019-08-02 PROCEDURE — 96374 THER/PROPH/DIAG INJ IV PUSH: CPT

## 2019-08-02 PROCEDURE — 6370000000 HC RX 637 (ALT 250 FOR IP): Performed by: EMERGENCY MEDICINE

## 2019-08-02 PROCEDURE — 6360000002 HC RX W HCPCS: Performed by: EMERGENCY MEDICINE

## 2019-08-02 RX ORDER — BENZTROPINE MESYLATE 1 MG/1
1 TABLET ORAL ONCE
Status: COMPLETED | OUTPATIENT
Start: 2019-08-02 | End: 2019-08-02

## 2019-08-02 RX ORDER — DIPHENHYDRAMINE HYDROCHLORIDE 50 MG/ML
50 INJECTION INTRAMUSCULAR; INTRAVENOUS ONCE
Status: COMPLETED | OUTPATIENT
Start: 2019-08-02 | End: 2019-08-02

## 2019-08-02 RX ORDER — CEPHALEXIN 500 MG/1
500 CAPSULE ORAL 4 TIMES DAILY
Qty: 28 CAPSULE | Refills: 0 | Status: SHIPPED | OUTPATIENT
Start: 2019-08-02 | End: 2019-08-09

## 2019-08-02 RX ORDER — HALOPERIDOL 5 MG/ML
5 INJECTION INTRAMUSCULAR ONCE
Status: COMPLETED | OUTPATIENT
Start: 2019-08-02 | End: 2019-08-02

## 2019-08-02 RX ORDER — CYCLOBENZAPRINE HCL 10 MG
10 TABLET ORAL 3 TIMES DAILY PRN
Qty: 30 TABLET | Refills: 0 | Status: SHIPPED | OUTPATIENT
Start: 2019-08-02 | End: 2019-08-12

## 2019-08-02 RX ADMIN — DIPHENHYDRAMINE HYDROCHLORIDE 50 MG: 50 INJECTION, SOLUTION INTRAMUSCULAR; INTRAVENOUS at 01:33

## 2019-08-02 RX ADMIN — HALOPERIDOL LACTATE 5 MG: 5 INJECTION INTRAMUSCULAR at 01:33

## 2019-08-02 RX ADMIN — BENZTROPINE MESYLATE 1 MG: 1 TABLET ORAL at 02:01

## 2019-08-02 NOTE — ED PROVIDER NOTES
CHIEF COMPLAINT  Arm Pain (L arm and L pain starting tonight with tremors on that side, states gets spasms and took muscle relaxer with no relief, has bulging disc and had MRI yesterday ) and Tremors      HISTORY OF PRESENT ILLNESS  Brandan Meade is a 45 y.o. female presents to the ED with L arm/L leg pain, brought in by EMS, starting this evening, had a fall a couple weeks ago, but no other trauma, worsening of her chronic pain tonight, hx of complex regional pain syndrome, kicked out of pain management for no shows, also has bulging discs of lumbar spine, had another MRI yesterday and hasn't heard results, has been taking her home meds, not helping enough, reports frequent spasms in muscles but her muscle relaxer not helping enough, but needs refill, gets tremors that she relates to her medications, no new numbness/weakness, no saddle anesthesia, no headache/vision changes, no dizziness, no abd pain/N/V/D, no bowel/bladder incontinence. Also c/o dental pain for about a week, had not called dentist, No other complaints, modifying factors or associated symptoms. I have reviewed the following from the nursing documentation.     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 [facility]     PTSD (post-traumatic stress disorder)      Past Surgical History:   Procedure Laterality Date     SECTION       Family History   Problem Relation Age of Onset    Cancer Mother     Cancer Father     Heart Disease Brother      Social History     Socioeconomic History    Marital status:      Spouse name: Not on file    Number of children: Not on file    Years of education: Not on

## 2019-08-02 NOTE — DISCHARGE INSTR - COC
SP:09557}       Date of Last BM: ***  No intake or output data in the 24 hours ending 19 0114  No intake/output data recorded.     Safety Concerns:     [address] Safety Concerns:[id number]}    Impairments/Disabilities:      [address] Impairments/Disabilities:487223122}    Nutrition Therapy:  Current Nutrition Therapy:   [address] LASS[address] Diet List:223154320}    Routes of Feeding: {CHP DME Other Feedings:475684558}  Liquids: {Slp liquid thickness:07843}  Daily Fluid Restriction: {CHP DME Yes amt example:819296900}  Last Modified Barium Swallow with Video (Video Swallowing Test): {Done Not Done BMCA:699096737}    Treatments at the Time of Hospital Discharge:   Respiratory Treatments: ***  Oxygen Therapy:  {Therapy; copd oxygen:20839}  Ventilator:    { CC Vent TJBI:720399642}    Rehab Therapies: {THERAPEUTIC INTERVENTION:9733700511}  Weight Bearing Status/Restrictions: [address]  Weight Bearin}  Other Medical Equipment (for information only, NOT a DME order):  {EQUIPMENT:082552411}  Other Treatments: ***    Patient's personal belongings (please select all that are sent with patient):  {[facility] DME Belongings:423614931}    RN SIGNATURE:  {Esignature:519448567}    CASE MANAGEMENT/SOCIAL WORK SECTION    Inpatient Status Date: ***    Readmission Risk Assessment Score:  Readmission Risk              Risk of Unplanned Readmission:        0           Discharging to Facility/ Agency   · Name:   · Address:  · Phone:  · Fax:    Dialysis Facility (if applicable)   · Name:  · Address:  · Dialysis Schedule:  · Phone:  · Fax:    / signature: {Esignature:590028725}    PHYSICIAN SECTION    Prognosis: {Prognosis:1804531795}    Condition at Discharge: [address] Patient Condition:452428664}    Rehab Potential (if transferring to Rehab): {Prognosis:7737437068}    Recommended Labs or Other Treatments After Discharge: ***    Physician Certification: I certify the above information and transfer of Brandan Race  is necessary

## 2019-08-05 ENCOUNTER — HOSPITAL ENCOUNTER (EMERGENCY)
Age: 44
Discharge: HOME | End: 2019-08-06
Payer: MEDICARE

## 2019-08-05 DIAGNOSIS — R25.3 JERKING: ICD-10-CM

## 2019-08-05 DIAGNOSIS — G90.50 COMPLEX REGIONAL PAIN SYNDROME TYPE 1, AFFECTING UNSPECIFIED SITE: Primary | ICD-10-CM

## 2019-08-05 PROCEDURE — 99283 EMERGENCY DEPT VISIT LOW MDM: CPT

## 2019-08-05 PROCEDURE — 96372 THER/PROPH/DIAG INJ SC/IM: CPT

## 2019-08-05 PROCEDURE — 6360000002 HC RX W HCPCS: Performed by: NURSE PRACTITIONER

## 2019-08-05 PROCEDURE — 6370000000 HC RX 637 (ALT 250 FOR IP): Performed by: NURSE PRACTITIONER

## 2019-08-05 RX ORDER — BENZTROPINE MESYLATE 1 MG/1
1 TABLET ORAL ONCE
Status: COMPLETED | OUTPATIENT
Start: 2019-08-05 | End: 2019-08-05

## 2019-08-05 RX ORDER — DIPHENHYDRAMINE HCL 25 MG
25 TABLET ORAL ONCE
Status: COMPLETED | OUTPATIENT
Start: 2019-08-05 | End: 2019-08-05

## 2019-08-05 RX ORDER — HALOPERIDOL 5 MG/ML
5 INJECTION INTRAMUSCULAR ONCE
Status: COMPLETED | OUTPATIENT
Start: 2019-08-05 | End: 2019-08-05

## 2019-08-05 RX ADMIN — HALOPERIDOL LACTATE 5 MG: 5 INJECTION INTRAMUSCULAR at 23:55

## 2019-08-05 RX ADMIN — DIPHENHYDRAMINE HCL 25 MG: 25 TABLET ORAL at 23:55

## 2019-08-05 RX ADMIN — BENZTROPINE MESYLATE 1 MG: 1 TABLET ORAL at 23:55

## 2019-08-05 ASSESSMENT — PAIN SCALES - GENERAL: PAINLEVEL_OUTOF10: 8

## 2019-08-06 VITALS
DIASTOLIC BLOOD PRESSURE: 75 MMHG | WEIGHT: 167 LBS | RESPIRATION RATE: 18 BRPM | HEIGHT: 64 IN | OXYGEN SATURATION: 98 % | SYSTOLIC BLOOD PRESSURE: 100 MMHG | BODY MASS INDEX: 28.51 KG/M2 | HEART RATE: 82 BPM | TEMPERATURE: 97.5 F

## 2019-08-08 NOTE — ED PROVIDER NOTES
[address] [place] [address]  ED  EMERGENCY DEPARTMENT ENCOUNTER      This patient was not seen and evaluated by the attending physician. Pt Name: Candido Connelly  MRN: [id number]  [place] 1975  Date of evaluation: 8/5/2019  Provider: BELA Montano - REJIC  PCP: Dulce Hardy      History provided by the patient. CHIEFCOMPLAINT:     Chief Complaint   Patient presents with    Back Pain     pt arrived via squad from home lower left radiates down into buttock/down legs       HISTORY OF PRESENT ILLNESS:      Candido Connelly is a 45 y.o. female who presents to [address] [place] [address]  ED with complaints of back pain. Patient arrived via life squad, patient complaining of back pain that radiates down her legs. Patient was just seen here recently for similar complaints, patient has a history of complex regional pain syndrome, patient states that she is an appointment tomorrow morning. Patient has a history of schizophrenia as well. Patient continued to voice more complaints, he was complaining of headache, arms hurting, legs hurting, back, abdomen, patient did not appear to be distressed during any of this. She asked me multiple times if the ER provided her Home. The patient was also her emergency today was, patient states that her main issue and the reason she came to the ED today was her back pain. Patient states that she is also been jerking, states that she has having abnormal muscle movements, she states that this is happened multiple times in the past.  she is here for further evaluation. Nursing Notes were reviewed     REVIEW OF SYSTEMS:     Review of Systems  All systems, a total of 10, are reviewed and negative except for those that were just noted in history present illness.         PAST MEDICAL HISTORY:     Past Medical History:   Diagnosis Date    Bipolar 1 disorder ([facility] [address] [facility]     Chronic constipation     CRPS (complex regional pain syndrome type II)     Dental

## 2019-08-09 ENCOUNTER — HOSPITAL ENCOUNTER (EMERGENCY)
Age: 44
Discharge: HOME | End: 2019-08-09
Payer: MEDICARE

## 2019-08-09 VITALS
DIASTOLIC BLOOD PRESSURE: 79 MMHG | BODY MASS INDEX: 29.7 KG/M2 | SYSTOLIC BLOOD PRESSURE: 111 MMHG | OXYGEN SATURATION: 99 % | TEMPERATURE: 98.6 F | HEART RATE: 87 BPM | WEIGHT: 173 LBS | RESPIRATION RATE: 16 BRPM

## 2019-08-09 DIAGNOSIS — Z91.89 POOR DENTAL HYGIENE: ICD-10-CM

## 2019-08-09 DIAGNOSIS — K08.89 PAIN, DENTAL: Primary | ICD-10-CM

## 2019-08-09 DIAGNOSIS — K02.9 PAIN DUE TO DENTAL CARIES: ICD-10-CM

## 2019-08-09 PROCEDURE — 99282 EMERGENCY DEPT VISIT SF MDM: CPT

## 2019-08-09 PROCEDURE — 6370000000 HC RX 637 (ALT 250 FOR IP): Performed by: NURSE PRACTITIONER

## 2019-08-09 RX ORDER — IBUPROFEN 800 MG/1
800 TABLET ORAL EVERY 8 HOURS PRN
Qty: 20 TABLET | Refills: 0 | Status: SHIPPED | OUTPATIENT
Start: 2019-08-09 | End: 2019-09-08

## 2019-08-09 RX ORDER — IBUPROFEN 800 MG/1
800 TABLET ORAL ONCE
Status: COMPLETED | OUTPATIENT
Start: 2019-08-09 | End: 2019-08-09

## 2019-08-09 RX ADMIN — IBUPROFEN 800 MG: 800 TABLET, FILM COATED ORAL at 23:00

## 2019-08-09 ASSESSMENT — PAIN SCALES - GENERAL
PAINLEVEL_OUTOF10: 8
PAINLEVEL_OUTOF10: 8

## 2019-08-10 NOTE — ED PROVIDER NOTES
file    Number of children: Not on file    Years of education: Not on file    Highest education level: Not on file   Occupational History    Not on file   Social Needs    Financial resource strain: Not on file    Food insecurity:     Worry: Not on file     Inability: Not on file    Transportation needs:     Medical: Not on file     Non-medical: Not on file   Tobacco Use    Smoking status: Current Every Day Smoker     Packs/day: 1.00     Types: Cigarettes    Smokeless tobacco: Never Used   Substance and Sexual Activity    Alcohol use: No    Drug use: No    Sexual activity: Not Currently   Lifestyle    Physical activity:     Days per week: Not on file     Minutes per session: Not on file    Stress: Not on file   Relationships    Social connections:     Talks on phone: Not on file     Gets together: Not on file     Attends [employer] service: Not on file     Active member of club or organization: Not on file     Attends meetings of clubs or organizations: Not on file     Relationship status: Not on file    Intimate partner violence:     Fear of current or ex partner: Not on file     Emotionally abused: Not on file     Physically abused: Not on file     Forced sexual activity: Not on file   Other Topics Concern    Not on file   Social History Narrative    Not on file     No current facility-administered medications for this encounter.       Current Outpatient Medications   Medication Sig Dispense Refill    ibuprofen (ADVIL;MOTRIN) 800 MG tablet Take 1 tablet by mouth every 8 hours as needed for Pain or Fever 20 tablet 0    cyclobenzaprine (FLEXERIL) 10 MG tablet Take 1 tablet by mouth 3 times daily as needed for Muscle spasms 30 tablet 0    hydrOXYzine (ATARAX) 25 MG tablet Take 25 mg by mouth 3 times daily as needed for Itching      escitalopram (LEXAPRO) 20 MG tablet Take 20 mg by mouth daily      albuterol sulfate HFA (VENTOLIN HFA) 108 (90 Base) MCG/ACT inhaler Inhale 2 puffs into the lungs every 6 hours as needed for Shortness of Breath 1 Inhaler 0    QUEtiapine (SEROQUEL) 100 MG tablet Take 100 mg by mouth 2 times daily      omeprazole (PRILOSEC) 40 MG delayed release capsule take 1 capsule by oral route every day before a meal       Allergies   Allergen Reactions    Clindamycin/Lincomycin Other (See Comments), Hives, Itching and Shortness Of Breath     Weakness, and dizziness  Eye and throat swelling       Penicillins Swelling and Other (See Comments)     Other reaction(s): anaphylaxis/angioedema, anaphylaxis/angioedema  Throat swelling  Eye and throat swelling   Eye and throat swelling      Amoxicillin     Citalopram Hydrobromide Other (See Comments)     unknown    Clavulanic Acid Nausea And Vomiting       REVIEW OF SYSTEMS    6 systems reviewed, pertinent positives per HPI otherwise noted to be negative      PHYSICAL EXAM  /79   Pulse 87   Temp 98.6 °F (37 °C) (Oral)   Resp 16   Wt 173 lb (78.5 kg)   SpO2 99%   BMI 29.70 kg/m²   GENERAL APPEARANCE: Awake and alert. Cooperative. No acute distress. HEAD: Normocephalic. Atraumatic. No facial asymmetry is observed. EYES: Conjunctiva are normal. Sclera are non-icteric. ENT: External ears are normal. Mucous membranes are moist.  Tenderness to palpation of left side of the upper jaw, no erythema, no edema, no obvious areas of abscess, no drainage noted in the gumline, airway is patent, pharynx is pink without edema. To the front teeth and teeth on the left side of the jaw there are several broken teeth that are broken at the level of the gumline and are brown in color. Patient is essentially edentulous to the upper jaw. Sublingual region:  Frenulum is intact, Blood vessels are visible. There is saliva present. There are no lesions, ulcerations, masses, or varicosities. The floor of the oropharynx is not elevated or erythematous, and is not tender to palpation. There is no posterior oropharynx erythema, edema, exudate. Posterior oropharynx is widely patent. NECK: Supple. Normal ROM. CHEST: Equal symmetric chest rise. Regular rate and rhythm. LUNGS: Breathing is unlabored. Speaking comfortably in full sentences. Abdomen: Non-distended. EXTREMITIES: MAEE. No acute deformities. SKIN: Warm and dry. NEUROLOGICAL: Alert and oriented. PROCEDURES:  None    LABS  I have reviewed all labs for this visit. No results found for this visit on 08/09/19. RADIOLOGY  Ct Abdomen Pelvis W Iv Contrast Additional Contrast? None    Result Date: 7/17/2019  EXAMINATION: CT OF THE ABDOMEN AND PELVIS WITH CONTRAST 7/17/2019 12:02 am TECHNIQUE: CT of the abdomen and pelvis was performed with the administration of intravenous contrast. Multiplanar reformatted images are provided for review. Dose modulation, iterative reconstruction, and/or weight based adjustment of the mA/kV was utilized to reduce the radiation dose to as low as reasonably achievable. COMPARISON: None. HISTORY: ORDERING SYSTEM PROVIDED HISTORY: periumbilical pain TECHNOLOGIST PROVIDED HISTORY: Additional Contrast?->None Reason for Exam: Abdominal Pain (Has been out of her medications for a week, Nauseas with 10/10 belly pain mid abdomen. States, \"it taste like infection\") History of gastroesophageal reflux, peptic ulcer disease FINDINGS: Lower Chest: Clear lung bases. Organs: The liver, spleen, pancreas, adrenal glands, gallbladder and kidneys show no acute abnormality GI/Bowel: The bowel shows normal caliber and course without suspected wall thickening. Normal appendix. Pelvis: The uterus is not visualized. The urinary bladder is nondistended Peritoneum/Retroperitoneum: The aorta is normal in caliber and course, showing mild calcifications. Bones/Soft Tissues: No acute bony abnormality. No free fluid or adenopathy. No evidence of acute intra-abdominopelvic inflammatory process or fluid collection. ED COURSE/MDM  Patient seen and evaluated.  [facility] [id number] [phone]  Go to   If symptoms worsen      DISPOSITION  Patient was discharged to home in good condition. Comment: Please note this report has been produced using speech recognition software and may contain errors related to that system including errors in grammar, punctuation, and spelling, as well as words and phrases that may be inappropriate. If there are any questions or concerns please feel free to contact the dictating provider for clarification.         Geryl Schaumann, APRN - CNP  08/10/19 0238

## 2019-09-08 ENCOUNTER — HOSPITAL ENCOUNTER (EMERGENCY)
Age: 44
Discharge: LEFT BEFORE DISCHARGE | End: 2019-09-08
Payer: MEDICARE

## 2019-09-08 VITALS
HEART RATE: 110 BPM | TEMPERATURE: 97.9 F | OXYGEN SATURATION: 98 % | DIASTOLIC BLOOD PRESSURE: 86 MMHG | SYSTOLIC BLOOD PRESSURE: 112 MMHG | HEIGHT: 64 IN | BODY MASS INDEX: 29.88 KG/M2 | WEIGHT: 175 LBS | RESPIRATION RATE: 16 BRPM

## 2019-09-08 PROCEDURE — 4500000002 HC ER NO CHARGE

## 2019-09-08 RX ORDER — CYCLOBENZAPRINE HCL 10 MG
10 TABLET ORAL 2 TIMES DAILY PRN
COMMUNITY
End: 2021-11-09 | Stop reason: SDUPTHER

## 2019-09-08 ASSESSMENT — PAIN DESCRIPTION - FREQUENCY: FREQUENCY: CONTINUOUS

## 2019-09-08 ASSESSMENT — PAIN SCALES - GENERAL: PAINLEVEL_OUTOF10: 10

## 2019-09-08 ASSESSMENT — PAIN DESCRIPTION - ORIENTATION: ORIENTATION: LEFT

## 2019-09-08 ASSESSMENT — PAIN DESCRIPTION - DESCRIPTORS: DESCRIPTORS: SHARP;DULL

## 2019-09-08 ASSESSMENT — PAIN DESCRIPTION - PAIN TYPE: TYPE: ACUTE PAIN

## 2019-09-08 ASSESSMENT — PAIN DESCRIPTION - LOCATION: LOCATION: BACK;ABDOMEN

## 2019-09-15 ENCOUNTER — HOSPITAL ENCOUNTER (EMERGENCY)
Age: 44
Discharge: HOME | End: 2019-09-16
Attending: EMERGENCY MEDICINE
Payer: MEDICARE

## 2019-09-15 DIAGNOSIS — R07.89 CHEST TIGHTNESS: ICD-10-CM

## 2019-09-15 DIAGNOSIS — R53.81 MALAISE: ICD-10-CM

## 2019-09-15 DIAGNOSIS — J40 COMPLICATED BRONCHITIS: Primary | ICD-10-CM

## 2019-09-15 DIAGNOSIS — R05.9 COUGH: ICD-10-CM

## 2019-09-15 DIAGNOSIS — F17.200 SMOKER: ICD-10-CM

## 2019-09-15 DIAGNOSIS — R51.9 SINUS HEADACHE: ICD-10-CM

## 2019-09-15 PROCEDURE — 99285 EMERGENCY DEPT VISIT HI MDM: CPT

## 2019-09-16 ENCOUNTER — APPOINTMENT (OUTPATIENT)
Dept: GENERAL RADIOLOGY | Age: 44
End: 2019-09-16
Payer: MEDICARE

## 2019-09-16 VITALS
WEIGHT: 175 LBS | HEART RATE: 82 BPM | OXYGEN SATURATION: 98 % | HEIGHT: 63 IN | DIASTOLIC BLOOD PRESSURE: 73 MMHG | BODY MASS INDEX: 31.01 KG/M2 | TEMPERATURE: 98.3 F | SYSTOLIC BLOOD PRESSURE: 106 MMHG | RESPIRATION RATE: 18 BRPM

## 2019-09-16 LAB
A/G RATIO: 1.6 (ref 1.1–2.2)
ALBUMIN SERPL-MCNC: 4.2 G/DL (ref 3.4–5)
ALP BLD-CCNC: 79 U/L (ref 40–129)
ALT SERPL-CCNC: 32 U/L (ref 10–40)
ANION GAP SERPL CALCULATED.3IONS-SCNC: 11 MMOL/L (ref 3–16)
AST SERPL-CCNC: 21 U/L (ref 15–37)
ATYPICAL LYMPHOCYTE RELATIVE PERCENT: 5 % (ref 0–6)
BASOPHILS ABSOLUTE: 0 K/UL (ref 0–0.2)
BASOPHILS RELATIVE PERCENT: 0 %
BILIRUB SERPL-MCNC: <0.2 MG/DL (ref 0–1)
BUN BLDV-MCNC: 13 MG/DL (ref 7–20)
CALCIUM SERPL-MCNC: 9.3 MG/DL (ref 8.3–10.6)
CHLORIDE BLD-SCNC: 101 MMOL/L (ref 99–110)
CO2: 25 MMOL/L (ref 21–32)
CREAT SERPL-MCNC: 0.8 MG/DL (ref 0.6–1.1)
EKG ATRIAL RATE: 75 BPM
EKG DIAGNOSIS: NORMAL
EKG P AXIS: 75 DEGREES
EKG P-R INTERVAL: 200 MS
EKG Q-T INTERVAL: 394 MS
EKG QRS DURATION: 74 MS
EKG QTC CALCULATION (BAZETT): 439 MS
EKG R AXIS: 39 DEGREES
EKG T AXIS: 52 DEGREES
EKG VENTRICULAR RATE: 75 BPM
EOSINOPHILS ABSOLUTE: 0.6 K/UL (ref 0–0.6)
EOSINOPHILS RELATIVE PERCENT: 5 %
GFR AFRICAN AMERICAN: >60
GFR NON-AFRICAN AMERICAN: >60
GLOBULIN: 2.6 G/DL
GLUCOSE BLD-MCNC: 93 MG/DL (ref 70–99)
HCT VFR BLD CALC: 38.9 % (ref 36–48)
HEMOGLOBIN: 12.9 G/DL (ref 12–16)
LYMPHOCYTES ABSOLUTE: 6.1 K/UL (ref 1–5.1)
LYMPHOCYTES RELATIVE PERCENT: 44 %
MCH RBC QN AUTO: 30.6 PG (ref 26–34)
MCHC RBC AUTO-ENTMCNC: 33.2 G/DL (ref 31–36)
MCV RBC AUTO: 92.2 FL (ref 80–100)
MONOCYTES ABSOLUTE: 0.6 K/UL (ref 0–1.3)
MONOCYTES RELATIVE PERCENT: 5 %
NEUTROPHILS ABSOLUTE: 5.1 K/UL (ref 1.7–7.7)
NEUTROPHILS RELATIVE PERCENT: 41 %
PDW BLD-RTO: 13.9 % (ref 12.4–15.4)
PLATELET # BLD: 272 K/UL (ref 135–450)
PLATELET SLIDE REVIEW: ADEQUATE
PMV BLD AUTO: 7.8 FL (ref 5–10.5)
POTASSIUM SERPL-SCNC: 4 MMOL/L (ref 3.5–5.1)
RBC # BLD: 4.22 M/UL (ref 4–5.2)
RBC # BLD: NORMAL 10*6/UL
S PYO AG THROAT QL: NEGATIVE
SLIDE REVIEW: ABNORMAL
SODIUM BLD-SCNC: 137 MMOL/L (ref 136–145)
TOTAL PROTEIN: 6.8 G/DL (ref 6.4–8.2)
TROPONIN: <0.01 NG/ML
WBC # BLD: 12.4 K/UL (ref 4–11)

## 2019-09-16 PROCEDURE — 6370000000 HC RX 637 (ALT 250 FOR IP): Performed by: EMERGENCY MEDICINE

## 2019-09-16 PROCEDURE — 93005 ELECTROCARDIOGRAM TRACING: CPT | Performed by: EMERGENCY MEDICINE

## 2019-09-16 PROCEDURE — 87880 STREP A ASSAY W/OPTIC: CPT

## 2019-09-16 PROCEDURE — 2580000003 HC RX 258: Performed by: EMERGENCY MEDICINE

## 2019-09-16 PROCEDURE — 84484 ASSAY OF TROPONIN QUANT: CPT

## 2019-09-16 PROCEDURE — 96361 HYDRATE IV INFUSION ADD-ON: CPT

## 2019-09-16 PROCEDURE — 80053 COMPREHEN METABOLIC PANEL: CPT

## 2019-09-16 PROCEDURE — 71045 X-RAY EXAM CHEST 1 VIEW: CPT

## 2019-09-16 PROCEDURE — 85025 COMPLETE CBC W/AUTO DIFF WBC: CPT

## 2019-09-16 PROCEDURE — 96375 TX/PRO/DX INJ NEW DRUG ADDON: CPT

## 2019-09-16 PROCEDURE — 87081 CULTURE SCREEN ONLY: CPT

## 2019-09-16 PROCEDURE — 6360000002 HC RX W HCPCS: Performed by: EMERGENCY MEDICINE

## 2019-09-16 PROCEDURE — 96374 THER/PROPH/DIAG INJ IV PUSH: CPT

## 2019-09-16 PROCEDURE — 93010 ELECTROCARDIOGRAM REPORT: CPT | Performed by: INTERNAL MEDICINE

## 2019-09-16 RX ORDER — 0.9 % SODIUM CHLORIDE 0.9 %
500 INTRAVENOUS SOLUTION INTRAVENOUS ONCE
Status: COMPLETED | OUTPATIENT
Start: 2019-09-16 | End: 2019-09-16

## 2019-09-16 RX ORDER — AZITHROMYCIN 250 MG/1
500 TABLET, FILM COATED ORAL ONCE
Status: COMPLETED | OUTPATIENT
Start: 2019-09-16 | End: 2019-09-16

## 2019-09-16 RX ORDER — KETOROLAC TROMETHAMINE 30 MG/ML
15 INJECTION, SOLUTION INTRAMUSCULAR; INTRAVENOUS ONCE
Status: COMPLETED | OUTPATIENT
Start: 2019-09-16 | End: 2019-09-16

## 2019-09-16 RX ORDER — DEXAMETHASONE SODIUM PHOSPHATE 10 MG/ML
10 INJECTION INTRAMUSCULAR; INTRAVENOUS ONCE
Status: COMPLETED | OUTPATIENT
Start: 2019-09-16 | End: 2019-09-16

## 2019-09-16 RX ORDER — FLUTICASONE PROPIONATE 50 MCG
1 SPRAY, SUSPENSION (ML) NASAL DAILY
Qty: 1 BOTTLE | Refills: 1 | Status: SHIPPED | OUTPATIENT
Start: 2019-09-16 | End: 2021-11-09 | Stop reason: SDUPTHER

## 2019-09-16 RX ORDER — BENZONATATE 200 MG/1
200 CAPSULE ORAL 3 TIMES DAILY PRN
Qty: 30 CAPSULE | Refills: 0 | Status: SHIPPED | OUTPATIENT
Start: 2019-09-16 | End: 2019-09-26

## 2019-09-16 RX ORDER — AZITHROMYCIN 250 MG/1
TABLET, FILM COATED ORAL
Qty: 1 PACKET | Refills: 0 | Status: ON HOLD | OUTPATIENT
Start: 2019-09-16 | End: 2021-05-04 | Stop reason: HOSPADM

## 2019-09-16 RX ADMIN — DEXAMETHASONE SODIUM PHOSPHATE 10 MG: 10 INJECTION, SOLUTION INTRAMUSCULAR; INTRAVENOUS at 01:32

## 2019-09-16 RX ADMIN — KETOROLAC TROMETHAMINE 15 MG: 30 INJECTION, SOLUTION INTRAMUSCULAR; INTRAVENOUS at 01:33

## 2019-09-16 RX ADMIN — AZITHROMYCIN 500 MG: 250 TABLET, FILM COATED ORAL at 01:30

## 2019-09-16 RX ADMIN — SODIUM CHLORIDE 500 ML: 9 INJECTION, SOLUTION INTRAVENOUS at 01:32

## 2019-09-16 ASSESSMENT — PAIN SCALES - GENERAL: PAINLEVEL_OUTOF10: 9

## 2019-09-16 NOTE — ED PROVIDER NOTES
Social Needs    Financial resource strain: Not on file    Food insecurity:     Worry: Not on file     Inability: Not on file    Transportation needs:     Medical: Not on file     Non-medical: Not on file   Tobacco Use    Smoking status: Current Every Day Smoker     Packs/day: 0.50     Types: Cigarettes    Smokeless tobacco: Never Used   Substance and Sexual Activity    Alcohol use: No    Drug use: No    Sexual activity: Not Currently   Lifestyle    Physical activity:     Days per week: Not on file     Minutes per session: Not on file    Stress: Not on file   Relationships    Social connections:     Talks on phone: Not on file     Gets together: Not on file     Attends Tenriism service: Not on file     Active member of club or organization: Not on file     Attends meetings of clubs or organizations: Not on file     Relationship status: Not on file    Intimate partner violence:     Fear of current or ex partner: Not on file     Emotionally abused: Not on file     Physically abused: Not on file     Forced sexual activity: Not on file   Other Topics Concern    Not on file   Social History Narrative    Not on file     No current facility-administered medications for this encounter.       Current Outpatient Medications   Medication Sig Dispense Refill    azithromycin (ZITHROMAX Z-MARIA T) 250 MG tablet Take 2 tablets (500 mg) on Day 1, and then take 1 tablet (250 mg) on days 2 through 5. 1 packet 0    benzonatate (TESSALON) 200 MG capsule Take 1 capsule by mouth 3 times daily as needed for Cough 30 capsule 0    fluticasone (FLONASE) 50 MCG/ACT nasal spray 1 spray by Each Nostril route daily 1 Bottle 1    cyclobenzaprine (FLEXERIL) 10 MG tablet Take 10 mg by mouth daily as needed for Muscle spasms       escitalopram (LEXAPRO) 20 MG tablet Take 20 mg by mouth daily      albuterol sulfate HFA (VENTOLIN HFA) 108 (90 Base) MCG/ACT inhaler Inhale 2 puffs into the lungs every 6 hours as needed for Shortness of Breath 1 Inhaler 0    omeprazole (PRILOSEC) 40 MG delayed release capsule take 1 capsule by oral route every day before a meal       Allergies   Allergen Reactions    Clindamycin/Lincomycin Other (See Comments), Hives, Itching and Shortness Of Breath     Weakness, and dizziness  Eye and throat swelling       Penicillins Swelling and Other (See Comments)     Other reaction(s): anaphylaxis/angioedema, anaphylaxis/angioedema  Throat swelling  Eye and throat swelling   Eye and throat swelling      Amoxicillin     Citalopram Hydrobromide      She thinks it made her bp to elevate    Clavulanic Acid Nausea And Vomiting       REVIEW OF SYSTEMS  10 systems reviewed, pertinent positives per HPI otherwise noted to be negative. PHYSICAL EXAM  /73   Pulse 82   Temp 98.3 °F (36.8 °C) (Oral)   Resp 18   Ht 5' 3\" (1.6 m)   Wt 175 lb (79.4 kg)   LMP 12/18/2013   SpO2 98%   BMI 31.00 kg/m²   GENERAL APPEARANCE: Awake and alert. Cooperative. No acute distress, appears fatigued, appears older than stated age  HEAD: Normocephalic. Atraumatic. EYES: PERRL. EOM's grossly intact. ENT: Mucous membranes are moist.  Edentulous, no TM erythema/bulging, midline uvula, no significant erythema/edema of tonsils  NECK: Supple. No adenopathy  HEART: RRR. No murmurs  LUNGS: Respirations nonlabored. Lungs are with coarse breath sounds, few end expiratory wheezes, hacking cough, no crackles or rhonchi. ABDOMEN: Soft. Non-distended. Non-tender. No guarding or rebound. EXTREMITIES: No peripheral edema. Moves all extremities equally. All extremities neurovascularly intact. SKIN: Warm and dry. No acute rashes. NEUROLOGICAL: Alert and oriented. No gross facial drooping. Normal speech  PSYCHIATRIC: Normal mood and affect. LABS  I have reviewed all labs for this visit.    Results for orders placed or performed during the hospital encounter of 09/15/19   Strep Screen Group A Throat   Result Value Ref Range    Rapid Strep A

## 2019-09-18 LAB — S PYO THROAT QL CULT: NORMAL

## 2020-04-10 ENCOUNTER — PATIENT MESSAGE (OUTPATIENT)
Dept: FAMILY MEDICINE | Facility: CLINIC | Age: 45
End: 2020-04-10

## 2020-08-16 ENCOUNTER — HOSPITAL ENCOUNTER (EMERGENCY)
Age: 45
Discharge: HOME | End: 2020-08-16
Payer: MEDICARE

## 2020-08-16 ENCOUNTER — APPOINTMENT (OUTPATIENT)
Dept: GENERAL RADIOLOGY | Age: 45
End: 2020-08-16
Payer: MEDICARE

## 2020-08-16 VITALS
HEIGHT: 64 IN | DIASTOLIC BLOOD PRESSURE: 67 MMHG | BODY MASS INDEX: 35 KG/M2 | RESPIRATION RATE: 18 BRPM | TEMPERATURE: 97.6 F | WEIGHT: 205 LBS | SYSTOLIC BLOOD PRESSURE: 117 MMHG | OXYGEN SATURATION: 95 % | HEART RATE: 101 BPM

## 2020-08-16 PROCEDURE — 73630 X-RAY EXAM OF FOOT: CPT

## 2020-08-16 PROCEDURE — 6370000000 HC RX 637 (ALT 250 FOR IP): Performed by: PHYSICIAN ASSISTANT

## 2020-08-16 PROCEDURE — 99283 EMERGENCY DEPT VISIT LOW MDM: CPT

## 2020-08-16 RX ORDER — ACETAMINOPHEN 500 MG
1000 TABLET ORAL ONCE
Status: COMPLETED | OUTPATIENT
Start: 2020-08-16 | End: 2020-08-16

## 2020-08-16 RX ADMIN — ACETAMINOPHEN 1000 MG: 500 TABLET ORAL at 15:30

## 2020-08-16 ASSESSMENT — PAIN SCALES - GENERAL
PAINLEVEL_OUTOF10: 6
PAINLEVEL_OUTOF10: 10
PAINLEVEL_OUTOF10: 9

## 2020-08-16 ASSESSMENT — PAIN DESCRIPTION - PAIN TYPE: TYPE: ACUTE PAIN

## 2020-08-16 NOTE — ED NOTES
Pt placed in 4in strapping ace wrap to left ankle/medium post op shoe for comfort/and mobility at home.      Chyrel Members, LPN  28/16/21 0102

## 2020-08-16 NOTE — ED NOTES
Pt instructed to follow up with Orthopedic Specialist. Assessed per David LINARES.      Shahida Mendoza LPN  76/81/05 [id number]

## 2020-08-16 NOTE — ED PROVIDER NOTES
Take 10 mg by mouth daily as needed for Muscle spasms Historical Med      escitalopram (LEXAPRO) 20 MG tablet Take 20 mg by mouth dailyHistorical Med      albuterol sulfate HFA (VENTOLIN HFA) 108 (90 Base) MCG/ACT inhaler Inhale 2 puffs into the lungs every 6 hours as needed for Shortness of Breath, Disp-1 Inhaler, R-0Print      omeprazole (PRILOSEC) 40 MG delayed release capsule take 1 capsule by oral route every day before a mealHistorical Med               Review of Systems:  Review of Systems  Positives and Pertinent negatives as per HPI. Except as noted above in the ROS, problem specific ROS was completed and is negative. Physical Exam:  Physical Exam  Vitals signs and nursing note reviewed. Constitutional:       Appearance: She is well-developed. She is not diaphoretic. HENT:      Head: Normocephalic and atraumatic. Right Ear: External ear normal.      Left Ear: External ear normal.      Nose: Nose normal.   Eyes:      General:         Right eye: No discharge. Left eye: No discharge. Neck:      Musculoskeletal: Normal range of motion and neck supple. Pulmonary:      Effort: Pulmonary effort is normal. No respiratory distress. Breath sounds: No stridor. Musculoskeletal:      Left foot: Decreased range of motion. Tenderness, bony tenderness and swelling present. Feet:    Skin:     General: Skin is warm and dry. Coloration: Skin is not pale. Neurological:      Mental Status: She is alert and oriented to person, place, and time. Psychiatric:         Behavior: Behavior normal.         MEDICAL DECISION MAKING    Vitals:    Vitals:    08/16/20 1444   BP: 117/67   Pulse: 101   Resp: 18   Temp: 97.6 °F (36.4 °C)   TempSrc: Oral   SpO2: 95%   Weight: 205 lb (93 kg)   Height: 5' 4\" (1.626 m)       LABS:Labs Reviewed - No data to display     Remainder of labs reviewed and werenegative at this time or not returned at the time of this note.     RADIOLOGY:   Non-plain film

## 2021-03-31 ENCOUNTER — OFFICE VISIT (OUTPATIENT)
Dept: PULMONOLOGY | Age: 46
End: 2021-03-31
Payer: MEDICARE

## 2021-03-31 VITALS
HEIGHT: 64 IN | HEART RATE: 63 BPM | TEMPERATURE: 96 F | WEIGHT: 213.6 LBS | DIASTOLIC BLOOD PRESSURE: 63 MMHG | SYSTOLIC BLOOD PRESSURE: 95 MMHG | BODY MASS INDEX: 36.47 KG/M2 | OXYGEN SATURATION: 97 %

## 2021-03-31 DIAGNOSIS — J44.1 COPD EXACERBATION (HCC): ICD-10-CM

## 2021-03-31 DIAGNOSIS — G47.30 OBSERVED SLEEP APNEA: ICD-10-CM

## 2021-03-31 DIAGNOSIS — F17.200 CURRENT SMOKER: ICD-10-CM

## 2021-03-31 DIAGNOSIS — K21.9 GASTROESOPHAGEAL REFLUX DISEASE, UNSPECIFIED WHETHER ESOPHAGITIS PRESENT: ICD-10-CM

## 2021-03-31 DIAGNOSIS — E66.01 CLASS 2 SEVERE OBESITY WITH SERIOUS COMORBIDITY AND BODY MASS INDEX (BMI) OF 36.0 TO 36.9 IN ADULT, UNSPECIFIED OBESITY TYPE (HCC): ICD-10-CM

## 2021-03-31 PROCEDURE — G8427 DOCREV CUR MEDS BY ELIG CLIN: HCPCS | Performed by: INTERNAL MEDICINE

## 2021-03-31 PROCEDURE — G8417 CALC BMI ABV UP PARAM F/U: HCPCS | Performed by: INTERNAL MEDICINE

## 2021-03-31 PROCEDURE — G8926 SPIRO NO PERF OR DOC: HCPCS | Performed by: INTERNAL MEDICINE

## 2021-03-31 PROCEDURE — G8484 FLU IMMUNIZE NO ADMIN: HCPCS | Performed by: INTERNAL MEDICINE

## 2021-03-31 PROCEDURE — 3023F SPIROM DOC REV: CPT | Performed by: INTERNAL MEDICINE

## 2021-03-31 PROCEDURE — 4004F PT TOBACCO SCREEN RCVD TLK: CPT | Performed by: INTERNAL MEDICINE

## 2021-03-31 PROCEDURE — 99204 OFFICE O/P NEW MOD 45 MIN: CPT | Performed by: INTERNAL MEDICINE

## 2021-03-31 RX ORDER — BUDESONIDE AND FORMOTEROL FUMARATE DIHYDRATE 160; 4.5 UG/1; UG/1
2 AEROSOL RESPIRATORY (INHALATION) 2 TIMES DAILY
COMMUNITY
Start: 2021-02-22 | End: 2021-11-09 | Stop reason: SDUPTHER

## 2021-03-31 RX ORDER — ONDANSETRON 4 MG/1
4 TABLET, FILM COATED ORAL EVERY 8 HOURS PRN
COMMUNITY
End: 2021-11-15 | Stop reason: SDUPTHER

## 2021-03-31 RX ORDER — GABAPENTIN 300 MG/1
600 CAPSULE ORAL 3 TIMES DAILY
COMMUNITY
Start: 2021-02-25

## 2021-03-31 RX ORDER — PSEUDOEPHEDRINE HCL 30 MG
100 TABLET ORAL 2 TIMES DAILY PRN
COMMUNITY
Start: 2019-08-15 | End: 2021-12-03 | Stop reason: SDUPTHER

## 2021-03-31 RX ORDER — ATORVASTATIN CALCIUM 10 MG/1
10 TABLET, FILM COATED ORAL DAILY
COMMUNITY
Start: 2021-02-03 | End: 2021-09-23 | Stop reason: SDUPTHER

## 2021-03-31 RX ORDER — PALIPERIDONE 9 MG/1
9 TABLET, EXTENDED RELEASE ORAL EVERY MORNING
COMMUNITY

## 2021-03-31 RX ORDER — QUETIAPINE FUMARATE 25 MG/1
25 TABLET, FILM COATED ORAL DAILY PRN
COMMUNITY

## 2021-03-31 RX ORDER — AMITRIPTYLINE HYDROCHLORIDE 50 MG/1
75 TABLET, FILM COATED ORAL NIGHTLY
COMMUNITY
Start: 2021-02-25 | End: 2021-08-19

## 2021-03-31 RX ORDER — LAMOTRIGINE 25 MG/1
50 TABLET ORAL DAILY
COMMUNITY
End: 2021-12-30 | Stop reason: SDUPTHER

## 2021-03-31 RX ORDER — FUROSEMIDE 40 MG/1
40 TABLET ORAL DAILY
COMMUNITY
Start: 2021-02-22 | End: 2021-12-30 | Stop reason: SDUPTHER

## 2021-03-31 RX ORDER — LEVOCETIRIZINE DIHYDROCHLORIDE 5 MG/1
5 TABLET, FILM COATED ORAL NIGHTLY
COMMUNITY
End: 2021-09-16 | Stop reason: SDUPTHER

## 2021-03-31 RX ORDER — BUSPIRONE HYDROCHLORIDE 10 MG/1
TABLET ORAL
COMMUNITY
Start: 2021-03-22 | End: 2021-08-19

## 2021-03-31 RX ORDER — CARVEDILOL 6.25 MG/1
6.25 TABLET ORAL 2 TIMES DAILY
COMMUNITY
Start: 2021-02-22 | End: 2021-09-23 | Stop reason: SDUPTHER

## 2021-03-31 RX ORDER — ASPIRIN 81 MG/1
TABLET ORAL DAILY
COMMUNITY
End: 2021-09-23 | Stop reason: SDUPTHER

## 2021-03-31 RX ORDER — PREDNISONE 20 MG/1
20 TABLET ORAL DAILY
Qty: 10 TABLET | Refills: 0 | Status: SHIPPED | OUTPATIENT
Start: 2021-03-31 | End: 2021-04-10

## 2021-03-31 ASSESSMENT — SLEEP AND FATIGUE QUESTIONNAIRES
HOW LIKELY ARE YOU TO NOD OFF OR FALL ASLEEP WHILE SITTING AND READING: 0
HOW LIKELY ARE YOU TO NOD OFF OR FALL ASLEEP WHILE SITTING AND TALKING TO SOMEONE: 0
HOW LIKELY ARE YOU TO NOD OFF OR FALL ASLEEP WHILE WATCHING TV: 2
HOW LIKELY ARE YOU TO NOD OFF OR FALL ASLEEP IN A CAR, WHILE STOPPED FOR A FEW MINUTES IN TRAFFIC: 0
NECK CIRCUMFERENCE (INCHES): 16
HOW LIKELY ARE YOU TO NOD OFF OR FALL ASLEEP WHILE SITTING INACTIVE IN A PUBLIC PLACE: 0

## 2021-03-31 NOTE — PROGRESS NOTES
Chief Complaint/Referring Provider:  Patient is being seen at the request of  DIONNE Walker for a consultation for Asthma/COPD     Presenting HPI: Patient is a 62-year-old female who was referred to the office for pulmonary evaluation for asthma and COPD    Patient on evaluation states that she feels shaky this morning, patient states that she has COPD/asthma/pulmonary emphysema along with sleep apnea as has been told to her by her previous providers, patient also states that she has oxygen at home which she takes at nighttime, patient on questioning states that she does have coughing along with that patient has respite secretions which is yellowish-green color without any hemoptysis, patient does have some nasal congestion and also has occasional epistaxis, patient states that she also has pleuritic chest pain, patient does not have any significant fever or chills, patient also states that she has had right ear infection which was treated, patient states that she has Symbicort along with that patient has a nebulizer and rescue inhaler which she has been using off-and-on, patient has had mild sinus congestion, patient has been allergic to pets which were there before but they have been giving away, patient has renal sore throat, patient has questionable oropharyngeal dysphagia, patient does have caffeine on regular basis which causes her to have GERD, patient is taking medication for the same, patient also states that she has irritable bowel syndrome, patient also has occasional leg swelling, patient continues to be a smoker patient smokes about half a pack a day, patient has had trial of nicotine patches and Chantix in the past with some improvement, patient does not have any change in the ambient environment any sick contacts, patient also states that she has a nerve damage in the legs and she does not know why but it is being investigated, patient has been given Mobic and tramadol for the same, patient also states that she had a sleep study done last year at [facility] and was told that she has RUTH but no CPAP was given to her, patient's family states that she stops breathing at nighttime to the extent that he is afraid that she will have a respiratory arrest, no other pertinent review of system of concern    Past Medical History:   Diagnosis Date    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     Psychosis ([facility]     PTSD (post-traumatic stress disorder)        Past Surgical History:   Procedure Laterality Date     SECTION         Allergies   Allergen Reactions    Clindamycin/Lincomycin Other (See Comments), Hives, Itching and Shortness Of Breath     Weakness, and dizziness  Eye and throat swelling       Penicillins Swelling and Other (See Comments)     Other reaction(s): anaphylaxis/angioedema, anaphylaxis/angioedema  Throat swelling  Eye and throat swelling   Eye and throat swelling      Amoxicillin     Citalopram Hydrobromide      She thinks it made her bp to elevate    Clavulanic Acid Nausea And Vomiting       Medication list was reviewed and updated as needed in Epic    Social History     Socioeconomic History    Marital status:      Spouse name: Not on file    Number of children: Not on file    Years of education: Not on file    Highest education level: Not on file   Occupational History    Not on file   Social Needs    Financial resource strain: Not on file    Food insecurity     Worry: Not on file     Inability: Not on file    Transportation needs     Medical: Not on file     Non-medical: Not on file   Tobacco Use    Smoking status: Current Every Day Smoker clubbing. No obvious joint deformity. Lymphadenopathy: No cervical or supraclavicular adenopathy. Skin: Skin is warm and dry. No rash or nodules on the exposed extremities. Psychiatric: Normal mood and affect. Behavior is normal.  No anxiety. Neurologic: Alert, awake and oriented. PERRL. Speech fluent      Sleep Medicine Data:  Sitting and reading: Would never doze  Watching TV: Moderate chance of dozing  Sitting, inactive in a public place (e.g. a theatre or a meeting): Would never doze  As a passenger in a car for an hour without a break: Moderate chance of dozing  Lying down to rest in the afternoon when circumstances permit: Moderate chance of dozing  Sitting and talking to someone: Would never doze  Sitting quietly after a lunch without alcohol: Moderate chance of dozing  In a car, while stopped for a few minutes in traffic: Would never doze  Total score: 8  Neck circumference: 16    Data:     Imaging:  I have reviewed radiology images personally. No orders to display     ECHO in 2020 from care everywhere-CONCLUSIONS     Normal global left ventricular size and systolic function with no obvious regional wall motion abnormalities.     Normal right ventricular size and function.     Normal left atrial size.     Mild-moderate tricuspid regurgitation.     Normal size inferior vena cava with normal inspiratory response.     Normal size aortic root and proximal ascending aorta.      Nuclear stress test- IMPRESSIONS    Homogeneous uptake of isotope throughout the left ventricle on both stress and rest images.     No evidence of myocardial ischemia or prior myocardial infarction.     Scan indicates low risk for cardiac events    Patient had CPET in October 2020 at [address] [place] [address] which shows patient to have poor effort cardiopulmonary stress test as patient was unable to walk on the treadmill and they were unable to comment on potential cardiac or potential pulmonary limitations spirometry shows no significant obstruction consider repeating the test    Patient's sleep study done in August she states that patient has moderate snoring along with that patient has more mild sleep apnea with RDI of 10.2 and AHI of 1.9 with no Cheyne-Moncada respiration at that time    Assessment:    1. COPD exacerbation (HCC)    - predniSONE (DELTASONE) 20 MG tablet; Take 1 tablet by mouth daily for 10 days  Dispense: 10 tablet; Refill: 0  - tiotropium (SPIRIVA RESPIMAT) 2.5 MCG/ACT AERS inhaler; Inhale 2 puffs into the lungs daily  Dispense: 1 Inhaler; Refill: 5    2. Observed sleep apnea    - Baseline Diagnostic Sleep Study; Future    3. Gastroesophageal reflux disease, unspecified whether esophagitis present      4. Class 2 severe obesity with serious comorbidity and body mass index (BMI) of 36.0 to 36.9 in adult, unspecified obesity type ([facility]      5.  Current smoker        Plan:   · Patient's review of system were discussed  · Patient was told about the pathophysiology of the disease process and its modifying factors  · On the basis of patient's clinical status it appears that patient has exacerbation of obstructive airway disease which may be a combination of COPD/asthma along with that patient also has observed sleep apnea on the basis of the findings from the family who is afraid that patient will someday stop breathing at nighttime because of her breathing pattern and snoring  · Smoking needs to be stopped otherwise patient will have increasing morbidity and mortality which was made clear to the patient and patient needs to see the options available and do the needful  · Patient was told about the pathophysiology and sequelae of RUTH  · Patient at this time has acute bronchospasm  · Patient was given Kenalog 80 mg IM into 1  · Patient has been taking Symbicort inhaler once a day  · Patient also takes albuterol inhaler or nebulizer when required  · Patient's inhaler technique was reviewed and it was improper and patient was shown how to take it properly with and without a spacer device  · Patient was given a sample of a spacer device from the office  · On the basis of patient's symptoms patient may have a competent of bronchospasm secondary to muscle spasm and patient may benefit from anticholinergics for that reason Spiriva Respimat ordered  · Patient was shown how to take it properly and was told to take once a day along with Symbicort on regular basis  · Patient to take albuterol inhaler 2 puffs every 6 on whenever necessary basis or nebulizer if need be  · Patient was also given some prednisone 20 mg once a day for 10 days  · Patient does not need any antibiotics from pulmonary standpoint of view  · Patient needs to have a repeat sleep study on the basis of patient's clinical symptoms  · Will reassess in the future about repeating a PFT  · Patient was told about diet and lifestyle modifications  · Patient's medication for PPI as per PCP to continue  · Patient needs to lose weight  · Patient needs to have a regular exercise and if patient cannot do any walking because of her neuropathy and patient may consider some water aerobics  · Smoking cessation is imperative  · Patient and family were made clear that patient needs to be proactive in taking care of herself otherwise she will have increasing morbidity and mortality  · Patient needs to have a comprehensive plan about diet lifestyle modifications medications and follow-ups and smoking cessation  · Patient further treatment depending on patient's clinical status and the test results

## 2021-03-31 NOTE — PROGRESS NOTES
MA Communication: The following orders are received by verbal communication from Mara Iglesias MD    Orders include:    Sleep Study- [facility]'s [facility] the [facility]. They will contact to schedule her testing. Patient will contact the office to schedule her follow up once she has scheduled her sleep study.     Kenalog 80mg given left deltoid  Lot [id number]  Exp 02/2022

## 2021-05-03 ENCOUNTER — HOSPITAL ENCOUNTER (OUTPATIENT)
Age: 46
Setting detail: OBSERVATION
Discharge: HOME | End: 2021-05-04
Attending: SURGERY | Admitting: SURGERY
Payer: MEDICARE

## 2021-05-03 DIAGNOSIS — K35.30 ACUTE APPENDICITIS WITH LOCALIZED PERITONITIS, WITHOUT PERFORATION, ABSCESS, OR GANGRENE: Primary | ICD-10-CM

## 2021-05-03 PROBLEM — K37 APPENDICITIS: Status: ACTIVE | Noted: 2021-05-03

## 2021-05-03 LAB
ANION GAP SERPL CALCULATED.3IONS-SCNC: 10 MMOL/L (ref 3–16)
BUN BLDV-MCNC: 9 MG/DL (ref 7–20)
CALCIUM SERPL-MCNC: 9.3 MG/DL (ref 8.3–10.6)
CHLORIDE BLD-SCNC: 103 MMOL/L (ref 99–110)
CO2: 25 MMOL/L (ref 21–32)
CREAT SERPL-MCNC: 0.8 MG/DL (ref 0.6–1.1)
GFR AFRICAN AMERICAN: >60
GFR NON-AFRICAN AMERICAN: >60
GLUCOSE BLD-MCNC: 108 MG/DL (ref 70–99)
HCT VFR BLD CALC: 39.1 % (ref 36–48)
HEMOGLOBIN: 13.1 G/DL (ref 12–16)
MCH RBC QN AUTO: 29.4 PG (ref 26–34)
MCHC RBC AUTO-ENTMCNC: 33.4 G/DL (ref 31–36)
MCV RBC AUTO: 87.9 FL (ref 80–100)
PDW BLD-RTO: 14.5 % (ref 12.4–15.4)
PLATELET # BLD: 293 K/UL (ref 135–450)
PMV BLD AUTO: 7.6 FL (ref 5–10.5)
POTASSIUM SERPL-SCNC: 3.7 MMOL/L (ref 3.5–5.1)
RBC # BLD: 4.45 M/UL (ref 4–5.2)
SODIUM BLD-SCNC: 138 MMOL/L (ref 136–145)
WBC # BLD: 17.6 K/UL (ref 4–11)

## 2021-05-03 PROCEDURE — 36415 COLL VENOUS BLD VENIPUNCTURE: CPT

## 2021-05-03 PROCEDURE — 94150 VITAL CAPACITY TEST: CPT

## 2021-05-03 PROCEDURE — 94640 AIRWAY INHALATION TREATMENT: CPT

## 2021-05-03 PROCEDURE — G0378 HOSPITAL OBSERVATION PER HR: HCPCS

## 2021-05-03 PROCEDURE — 6360000002 HC RX W HCPCS: Performed by: SURGERY

## 2021-05-03 PROCEDURE — 96361 HYDRATE IV INFUSION ADD-ON: CPT

## 2021-05-03 PROCEDURE — 96372 THER/PROPH/DIAG INJ SC/IM: CPT

## 2021-05-03 PROCEDURE — 6370000000 HC RX 637 (ALT 250 FOR IP): Performed by: SURGERY

## 2021-05-03 PROCEDURE — 94761 N-INVAS EAR/PLS OXIMETRY MLT: CPT

## 2021-05-03 PROCEDURE — 2580000003 HC RX 258: Performed by: SURGERY

## 2021-05-03 PROCEDURE — 96375 TX/PRO/DX INJ NEW DRUG ADDON: CPT

## 2021-05-03 PROCEDURE — 96365 THER/PROPH/DIAG IV INF INIT: CPT

## 2021-05-03 PROCEDURE — G0379 DIRECT REFER HOSPITAL OBSERV: HCPCS

## 2021-05-03 PROCEDURE — 2500000003 HC RX 250 WO HCPCS: Performed by: SURGERY

## 2021-05-03 PROCEDURE — 85027 COMPLETE CBC AUTOMATED: CPT

## 2021-05-03 PROCEDURE — 80048 BASIC METABOLIC PNL TOTAL CA: CPT

## 2021-05-03 RX ORDER — ONDANSETRON 2 MG/ML
4 INJECTION INTRAMUSCULAR; INTRAVENOUS EVERY 6 HOURS PRN
Status: DISCONTINUED | OUTPATIENT
Start: 2021-05-03 | End: 2021-05-04 | Stop reason: HOSPADM

## 2021-05-03 RX ORDER — LAMOTRIGINE 25 MG/1
50 TABLET ORAL DAILY
Status: DISCONTINUED | OUTPATIENT
Start: 2021-05-04 | End: 2021-05-04 | Stop reason: HOSPADM

## 2021-05-03 RX ORDER — TRAMADOL HYDROCHLORIDE 50 MG/1
50 TABLET ORAL EVERY 6 HOURS PRN
Status: ON HOLD | COMMUNITY
End: 2021-05-04 | Stop reason: HOSPADM

## 2021-05-03 RX ORDER — BUDESONIDE AND FORMOTEROL FUMARATE DIHYDRATE 160; 4.5 UG/1; UG/1
2 AEROSOL RESPIRATORY (INHALATION) 2 TIMES DAILY
Status: DISCONTINUED | OUTPATIENT
Start: 2021-05-03 | End: 2021-05-04 | Stop reason: HOSPADM

## 2021-05-03 RX ORDER — ACETAMINOPHEN 325 MG/1
650 TABLET ORAL EVERY 6 HOURS PRN
Status: DISCONTINUED | OUTPATIENT
Start: 2021-05-03 | End: 2021-05-04 | Stop reason: HOSPADM

## 2021-05-03 RX ORDER — LAMOTRIGINE 25 MG/1
25 TABLET ORAL DAILY
Status: DISCONTINUED | OUTPATIENT
Start: 2021-05-03 | End: 2021-05-03

## 2021-05-03 RX ORDER — KETOROLAC TROMETHAMINE 30 MG/ML
30 INJECTION, SOLUTION INTRAMUSCULAR; INTRAVENOUS EVERY 8 HOURS PRN
Status: DISCONTINUED | OUTPATIENT
Start: 2021-05-03 | End: 2021-05-04 | Stop reason: HOSPADM

## 2021-05-03 RX ORDER — SODIUM CHLORIDE 0.9 % (FLUSH) 0.9 %
SYRINGE (ML) INJECTION
Status: DISPENSED
Start: 2021-05-03 | End: 2021-05-04

## 2021-05-03 RX ORDER — PROMETHAZINE HYDROCHLORIDE 25 MG/ML
6.25 INJECTION, SOLUTION INTRAMUSCULAR; INTRAVENOUS EVERY 6 HOURS PRN
Status: DISCONTINUED | OUTPATIENT
Start: 2021-05-03 | End: 2021-05-04 | Stop reason: HOSPADM

## 2021-05-03 RX ORDER — MELOXICAM 7.5 MG/1
7.5 TABLET ORAL DAILY
COMMUNITY
End: 2021-08-19

## 2021-05-03 RX ORDER — CARVEDILOL 6.25 MG/1
6.25 TABLET ORAL 2 TIMES DAILY
Status: DISCONTINUED | OUTPATIENT
Start: 2021-05-03 | End: 2021-05-04 | Stop reason: HOSPADM

## 2021-05-03 RX ORDER — SODIUM CHLORIDE 9 MG/ML
INJECTION, SOLUTION INTRAVENOUS CONTINUOUS
Status: DISCONTINUED | OUTPATIENT
Start: 2021-05-03 | End: 2021-05-04

## 2021-05-03 RX ADMIN — MEROPENEM 1000 MG: 1 INJECTION, POWDER, FOR SOLUTION INTRAVENOUS at 21:38

## 2021-05-03 RX ADMIN — HYDROMORPHONE HYDROCHLORIDE 0.5 MG: 1 INJECTION, SOLUTION INTRAMUSCULAR; INTRAVENOUS; SUBCUTANEOUS at 19:52

## 2021-05-03 RX ADMIN — CARVEDILOL 6.25 MG: 6.25 TABLET, FILM COATED ORAL at 21:37

## 2021-05-03 RX ADMIN — Medication 2 PUFF: at 19:58

## 2021-05-03 RX ADMIN — LAMOTRIGINE 25 MG: 25 TABLET ORAL at 19:52

## 2021-05-03 RX ADMIN — SODIUM CHLORIDE: 9 INJECTION, SOLUTION INTRAVENOUS at 19:52

## 2021-05-03 RX ADMIN — FAMOTIDINE 20 MG: 10 INJECTION, SOLUTION INTRAVENOUS at 21:37

## 2021-05-03 RX ADMIN — ENOXAPARIN SODIUM 40 MG: 40 INJECTION SUBCUTANEOUS at 21:37

## 2021-05-03 ASSESSMENT — PAIN SCALES - GENERAL
PAINLEVEL_OUTOF10: 0
PAINLEVEL_OUTOF10: 8

## 2021-05-04 ENCOUNTER — ANESTHESIA (OUTPATIENT)
Dept: OPERATING ROOM | Age: 46
End: 2021-05-04
Payer: MEDICARE

## 2021-05-04 ENCOUNTER — ANESTHESIA EVENT (OUTPATIENT)
Dept: OPERATING ROOM | Age: 46
End: 2021-05-04
Payer: MEDICARE

## 2021-05-04 VITALS
OXYGEN SATURATION: 96 % | HEART RATE: 102 BPM | SYSTOLIC BLOOD PRESSURE: 128 MMHG | DIASTOLIC BLOOD PRESSURE: 81 MMHG | TEMPERATURE: 97 F | RESPIRATION RATE: 18 BRPM

## 2021-05-04 VITALS — SYSTOLIC BLOOD PRESSURE: 103 MMHG | OXYGEN SATURATION: 100 % | DIASTOLIC BLOOD PRESSURE: 69 MMHG

## 2021-05-04 PROBLEM — K37 APPENDICITIS: Status: RESOLVED | Noted: 2021-05-03 | Resolved: 2021-05-04

## 2021-05-04 LAB
ANION GAP SERPL CALCULATED.3IONS-SCNC: 7 MMOL/L (ref 3–16)
BASOPHILS ABSOLUTE: 0.1 K/UL (ref 0–0.2)
BASOPHILS RELATIVE PERCENT: 0.5 %
BUN BLDV-MCNC: 11 MG/DL (ref 7–20)
CALCIUM SERPL-MCNC: 8.9 MG/DL (ref 8.3–10.6)
CHLORIDE BLD-SCNC: 105 MMOL/L (ref 99–110)
CO2: 26 MMOL/L (ref 21–32)
CREAT SERPL-MCNC: 0.8 MG/DL (ref 0.6–1.1)
EOSINOPHILS ABSOLUTE: 0.3 K/UL (ref 0–0.6)
EOSINOPHILS RELATIVE PERCENT: 2.3 %
GFR AFRICAN AMERICAN: >60
GFR NON-AFRICAN AMERICAN: >60
GLUCOSE BLD-MCNC: 84 MG/DL (ref 70–99)
GLUCOSE BLD-MCNC: 94 MG/DL (ref 70–99)
HCT VFR BLD CALC: 38.2 % (ref 36–48)
HEMOGLOBIN: 12.5 G/DL (ref 12–16)
LYMPHOCYTES ABSOLUTE: 5.1 K/UL (ref 1–5.1)
LYMPHOCYTES RELATIVE PERCENT: 34.6 %
MAGNESIUM: 2.1 MG/DL (ref 1.8–2.4)
MCH RBC QN AUTO: 29 PG (ref 26–34)
MCHC RBC AUTO-ENTMCNC: 32.6 G/DL (ref 31–36)
MCV RBC AUTO: 88.8 FL (ref 80–100)
MONOCYTES ABSOLUTE: 0.7 K/UL (ref 0–1.3)
MONOCYTES RELATIVE PERCENT: 4.9 %
NEUTROPHILS ABSOLUTE: 8.6 K/UL (ref 1.7–7.7)
NEUTROPHILS RELATIVE PERCENT: 57.7 %
PDW BLD-RTO: 14.9 % (ref 12.4–15.4)
PERFORMED ON: NORMAL
PHOSPHORUS: 3.4 MG/DL (ref 2.5–4.9)
PLATELET # BLD: 281 K/UL (ref 135–450)
PMV BLD AUTO: 7.9 FL (ref 5–10.5)
POTASSIUM SERPL-SCNC: 3.8 MMOL/L (ref 3.5–5.1)
RBC # BLD: 4.31 M/UL (ref 4–5.2)
SODIUM BLD-SCNC: 138 MMOL/L (ref 136–145)
WBC # BLD: 14.9 K/UL (ref 4–11)

## 2021-05-04 PROCEDURE — 96375 TX/PRO/DX INJ NEW DRUG ADDON: CPT

## 2021-05-04 PROCEDURE — 6360000002 HC RX W HCPCS: Performed by: SURGERY

## 2021-05-04 PROCEDURE — G0378 HOSPITAL OBSERVATION PER HR: HCPCS

## 2021-05-04 PROCEDURE — 2500000003 HC RX 250 WO HCPCS: Performed by: SURGERY

## 2021-05-04 PROCEDURE — 2580000003 HC RX 258: Performed by: SURGERY

## 2021-05-04 PROCEDURE — 88307 TISSUE EXAM BY PATHOLOGIST: CPT

## 2021-05-04 PROCEDURE — 94640 AIRWAY INHALATION TREATMENT: CPT

## 2021-05-04 PROCEDURE — 6370000000 HC RX 637 (ALT 250 FOR IP): Performed by: SURGERY

## 2021-05-04 PROCEDURE — 94761 N-INVAS EAR/PLS OXIMETRY MLT: CPT

## 2021-05-04 PROCEDURE — 96361 HYDRATE IV INFUSION ADD-ON: CPT

## 2021-05-04 PROCEDURE — 80048 BASIC METABOLIC PNL TOTAL CA: CPT

## 2021-05-04 PROCEDURE — 7100000000 HC PACU RECOVERY - FIRST 15 MIN: Performed by: SURGERY

## 2021-05-04 PROCEDURE — 85025 COMPLETE CBC W/AUTO DIFF WBC: CPT

## 2021-05-04 PROCEDURE — 6370000000 HC RX 637 (ALT 250 FOR IP): Performed by: HOSPITALIST

## 2021-05-04 PROCEDURE — 84100 ASSAY OF PHOSPHORUS: CPT

## 2021-05-04 PROCEDURE — 36415 COLL VENOUS BLD VENIPUNCTURE: CPT

## 2021-05-04 PROCEDURE — 3600000004 HC SURGERY LEVEL 4 BASE: Performed by: SURGERY

## 2021-05-04 PROCEDURE — 3700000000 HC ANESTHESIA ATTENDED CARE: Performed by: SURGERY

## 2021-05-04 PROCEDURE — 94150 VITAL CAPACITY TEST: CPT

## 2021-05-04 PROCEDURE — 2500000003 HC RX 250 WO HCPCS

## 2021-05-04 PROCEDURE — 99220 PR INITIAL OBSERVATION CARE/DAY 70 MINUTES: CPT | Performed by: SURGERY

## 2021-05-04 PROCEDURE — 3700000001 HC ADD 15 MINUTES (ANESTHESIA): Performed by: SURGERY

## 2021-05-04 PROCEDURE — 2720000010 HC SURG SUPPLY STERILE: Performed by: SURGERY

## 2021-05-04 PROCEDURE — 3600000014 HC SURGERY LEVEL 4 ADDTL 15MIN: Performed by: SURGERY

## 2021-05-04 PROCEDURE — 2709999900 HC NON-CHARGEABLE SUPPLY: Performed by: SURGERY

## 2021-05-04 PROCEDURE — 6360000002 HC RX W HCPCS

## 2021-05-04 PROCEDURE — 96376 TX/PRO/DX INJ SAME DRUG ADON: CPT

## 2021-05-04 PROCEDURE — 83735 ASSAY OF MAGNESIUM: CPT

## 2021-05-04 PROCEDURE — 7100000001 HC PACU RECOVERY - ADDTL 15 MIN: Performed by: SURGERY

## 2021-05-04 PROCEDURE — 44204 LAPARO PARTIAL COLECTOMY: CPT | Performed by: SURGERY

## 2021-05-04 RX ORDER — ONDANSETRON 2 MG/ML
INJECTION INTRAMUSCULAR; INTRAVENOUS PRN
Status: DISCONTINUED | OUTPATIENT
Start: 2021-05-04 | End: 2021-05-04 | Stop reason: SDUPTHER

## 2021-05-04 RX ORDER — ONDANSETRON 2 MG/ML
4 INJECTION INTRAMUSCULAR; INTRAVENOUS
Status: DISCONTINUED | OUTPATIENT
Start: 2021-05-04 | End: 2021-05-04 | Stop reason: HOSPADM

## 2021-05-04 RX ORDER — PROMETHAZINE HYDROCHLORIDE 25 MG/ML
6.25 INJECTION, SOLUTION INTRAMUSCULAR; INTRAVENOUS
Status: DISCONTINUED | OUTPATIENT
Start: 2021-05-04 | End: 2021-05-04 | Stop reason: HOSPADM

## 2021-05-04 RX ORDER — HYDRALAZINE HYDROCHLORIDE 20 MG/ML
5 INJECTION INTRAMUSCULAR; INTRAVENOUS EVERY 10 MIN PRN
Status: DISCONTINUED | OUTPATIENT
Start: 2021-05-04 | End: 2021-05-04 | Stop reason: HOSPADM

## 2021-05-04 RX ORDER — OXYCODONE HYDROCHLORIDE AND ACETAMINOPHEN 5; 325 MG/1; MG/1
2 TABLET ORAL PRN
Status: DISCONTINUED | OUTPATIENT
Start: 2021-05-04 | End: 2021-05-04 | Stop reason: HOSPADM

## 2021-05-04 RX ORDER — MAGNESIUM HYDROXIDE 1200 MG/15ML
LIQUID ORAL CONTINUOUS PRN
Status: COMPLETED | OUTPATIENT
Start: 2021-05-04 | End: 2021-05-04

## 2021-05-04 RX ORDER — OXYCODONE HYDROCHLORIDE AND ACETAMINOPHEN 5; 325 MG/1; MG/1
1 TABLET ORAL PRN
Status: DISCONTINUED | OUTPATIENT
Start: 2021-05-04 | End: 2021-05-04 | Stop reason: HOSPADM

## 2021-05-04 RX ORDER — OXYCODONE HYDROCHLORIDE 5 MG/1
5 TABLET ORAL EVERY 4 HOURS PRN
Qty: 20 TABLET | Refills: 0 | Status: SHIPPED | OUTPATIENT
Start: 2021-05-04 | End: 2021-05-09

## 2021-05-04 RX ORDER — METRONIDAZOLE 500 MG/1
500 TABLET ORAL 3 TIMES DAILY
Qty: 30 TABLET | Refills: 0 | Status: SHIPPED | OUTPATIENT
Start: 2021-05-04 | End: 2021-05-14

## 2021-05-04 RX ORDER — OXYCODONE HYDROCHLORIDE 5 MG/1
5 TABLET ORAL EVERY 4 HOURS PRN
Status: DISCONTINUED | OUTPATIENT
Start: 2021-05-04 | End: 2021-05-04 | Stop reason: HOSPADM

## 2021-05-04 RX ORDER — BUPIVACAINE HYDROCHLORIDE 5 MG/ML
INJECTION, SOLUTION EPIDURAL; INTRACAUDAL PRN
Status: DISCONTINUED | OUTPATIENT
Start: 2021-05-04 | End: 2021-05-04 | Stop reason: ALTCHOICE

## 2021-05-04 RX ORDER — FENTANYL CITRATE 50 UG/ML
25 INJECTION, SOLUTION INTRAMUSCULAR; INTRAVENOUS EVERY 5 MIN PRN
Status: DISCONTINUED | OUTPATIENT
Start: 2021-05-04 | End: 2021-05-04 | Stop reason: HOSPADM

## 2021-05-04 RX ORDER — MEPERIDINE HYDROCHLORIDE 25 MG/ML
12.5 INJECTION INTRAMUSCULAR; INTRAVENOUS; SUBCUTANEOUS EVERY 5 MIN PRN
Status: DISCONTINUED | OUTPATIENT
Start: 2021-05-04 | End: 2021-05-04 | Stop reason: HOSPADM

## 2021-05-04 RX ORDER — OXYCODONE HYDROCHLORIDE 5 MG/1
10 TABLET ORAL EVERY 4 HOURS PRN
Status: DISCONTINUED | OUTPATIENT
Start: 2021-05-04 | End: 2021-05-04 | Stop reason: HOSPADM

## 2021-05-04 RX ORDER — SUCCINYLCHOLINE CHLORIDE 20 MG/ML
INJECTION INTRAMUSCULAR; INTRAVENOUS PRN
Status: DISCONTINUED | OUTPATIENT
Start: 2021-05-04 | End: 2021-05-04 | Stop reason: SDUPTHER

## 2021-05-04 RX ORDER — LIDOCAINE HYDROCHLORIDE 20 MG/ML
INJECTION, SOLUTION EPIDURAL; INFILTRATION; INTRACAUDAL; PERINEURAL PRN
Status: DISCONTINUED | OUTPATIENT
Start: 2021-05-04 | End: 2021-05-04 | Stop reason: SDUPTHER

## 2021-05-04 RX ORDER — HYDROMORPHONE HCL 110MG/55ML
PATIENT CONTROLLED ANALGESIA SYRINGE INTRAVENOUS PRN
Status: DISCONTINUED | OUTPATIENT
Start: 2021-05-04 | End: 2021-05-04 | Stop reason: SDUPTHER

## 2021-05-04 RX ORDER — LEVOFLOXACIN 500 MG/1
500 TABLET, FILM COATED ORAL DAILY
Qty: 10 TABLET | Refills: 0 | Status: SHIPPED | OUTPATIENT
Start: 2021-05-04 | End: 2021-05-14

## 2021-05-04 RX ORDER — SODIUM CHLORIDE 9 MG/ML
INJECTION, SOLUTION INTRAVENOUS CONTINUOUS
Status: DISCONTINUED | OUTPATIENT
Start: 2021-05-04 | End: 2021-05-04 | Stop reason: HOSPADM

## 2021-05-04 RX ORDER — PROPOFOL 10 MG/ML
INJECTION, EMULSION INTRAVENOUS PRN
Status: DISCONTINUED | OUTPATIENT
Start: 2021-05-04 | End: 2021-05-04 | Stop reason: SDUPTHER

## 2021-05-04 RX ORDER — MIDAZOLAM HYDROCHLORIDE 1 MG/ML
INJECTION INTRAMUSCULAR; INTRAVENOUS PRN
Status: DISCONTINUED | OUTPATIENT
Start: 2021-05-04 | End: 2021-05-04 | Stop reason: SDUPTHER

## 2021-05-04 RX ORDER — DEXAMETHASONE SODIUM PHOSPHATE 4 MG/ML
INJECTION, SOLUTION INTRA-ARTICULAR; INTRALESIONAL; INTRAMUSCULAR; INTRAVENOUS; SOFT TISSUE PRN
Status: DISCONTINUED | OUTPATIENT
Start: 2021-05-04 | End: 2021-05-04 | Stop reason: SDUPTHER

## 2021-05-04 RX ORDER — ROCURONIUM BROMIDE 10 MG/ML
INJECTION, SOLUTION INTRAVENOUS PRN
Status: DISCONTINUED | OUTPATIENT
Start: 2021-05-04 | End: 2021-05-04 | Stop reason: SDUPTHER

## 2021-05-04 RX ADMIN — HYDROMORPHONE HYDROCHLORIDE 0.5 MG: 1 INJECTION, SOLUTION INTRAMUSCULAR; INTRAVENOUS; SUBCUTANEOUS at 10:58

## 2021-05-04 RX ADMIN — ONDANSETRON HYDROCHLORIDE 4 MG: 2 INJECTION, SOLUTION INTRAMUSCULAR; INTRAVENOUS at 11:05

## 2021-05-04 RX ADMIN — SUGAMMADEX 400 MG: 100 INJECTION, SOLUTION INTRAVENOUS at 12:56

## 2021-05-04 RX ADMIN — LIDOCAINE HYDROCHLORIDE 100 MG: 20 INJECTION, SOLUTION EPIDURAL; INFILTRATION; INTRACAUDAL; PERINEURAL at 12:25

## 2021-05-04 RX ADMIN — FAMOTIDINE 20 MG: 10 INJECTION, SOLUTION INTRAVENOUS at 10:43

## 2021-05-04 RX ADMIN — HYDROMORPHONE HYDROCHLORIDE 1 MG: 2 INJECTION, SOLUTION INTRAMUSCULAR; INTRAVENOUS; SUBCUTANEOUS at 12:25

## 2021-05-04 RX ADMIN — SODIUM CHLORIDE: 9 INJECTION, SOLUTION INTRAVENOUS at 13:06

## 2021-05-04 RX ADMIN — SODIUM CHLORIDE: 9 INJECTION, SOLUTION INTRAVENOUS at 04:03

## 2021-05-04 RX ADMIN — ROCURONIUM BROMIDE 40 MG: 10 INJECTION, SOLUTION INTRAVENOUS at 12:37

## 2021-05-04 RX ADMIN — TIOTROPIUM BROMIDE INHALATION SPRAY 2 PUFF: 3.12 SPRAY, METERED RESPIRATORY (INHALATION) at 14:14

## 2021-05-04 RX ADMIN — DEXAMETHASONE SODIUM PHOSPHATE 10 MG: 4 INJECTION, SOLUTION INTRAMUSCULAR; INTRAVENOUS at 12:39

## 2021-05-04 RX ADMIN — ONDANSETRON 4 MG: 2 INJECTION, SOLUTION INTRAMUSCULAR; INTRAVENOUS at 12:25

## 2021-05-04 RX ADMIN — LAMOTRIGINE 50 MG: 25 TABLET ORAL at 10:44

## 2021-05-04 RX ADMIN — MIDAZOLAM 2 MG: 1 INJECTION INTRAMUSCULAR; INTRAVENOUS at 12:25

## 2021-05-04 RX ADMIN — MEROPENEM 1000 MG: 1 INJECTION, POWDER, FOR SOLUTION INTRAVENOUS at 12:17

## 2021-05-04 RX ADMIN — OXYCODONE HYDROCHLORIDE 5 MG: 5 TABLET ORAL at 14:54

## 2021-05-04 RX ADMIN — PROPOFOL 100 MG: 10 INJECTION, EMULSION INTRAVENOUS at 12:29

## 2021-05-04 RX ADMIN — MEROPENEM 1000 MG: 1 INJECTION, POWDER, FOR SOLUTION INTRAVENOUS at 04:03

## 2021-05-04 RX ADMIN — SODIUM CHLORIDE: 9 INJECTION, SOLUTION INTRAVENOUS at 14:21

## 2021-05-04 RX ADMIN — SUCCINYLCHOLINE CHLORIDE 140 MG: 20 INJECTION, SOLUTION INTRAMUSCULAR; INTRAVENOUS at 12:30

## 2021-05-04 RX ADMIN — ROCURONIUM BROMIDE 5 MG: 10 INJECTION, SOLUTION INTRAVENOUS at 12:28

## 2021-05-04 RX ADMIN — HYDROMORPHONE HYDROCHLORIDE 0.5 MG: 1 INJECTION, SOLUTION INTRAMUSCULAR; INTRAVENOUS; SUBCUTANEOUS at 01:26

## 2021-05-04 ASSESSMENT — PAIN - FUNCTIONAL ASSESSMENT
PAIN_FUNCTIONAL_ASSESSMENT: ACTIVITIES ARE NOT PREVENTED
PAIN_FUNCTIONAL_ASSESSMENT: ACTIVITIES ARE NOT PREVENTED

## 2021-05-04 ASSESSMENT — PAIN DESCRIPTION - DESCRIPTORS
DESCRIPTORS: SHARP
DESCRIPTORS: SORE

## 2021-05-04 ASSESSMENT — PULMONARY FUNCTION TESTS
PIF_VALUE: 24
PIF_VALUE: 16
PIF_VALUE: 30
PIF_VALUE: 22
PIF_VALUE: 30
PIF_VALUE: 24
PIF_VALUE: 29
PIF_VALUE: 17
PIF_VALUE: 30
PIF_VALUE: 16
PIF_VALUE: 0
PIF_VALUE: 19
PIF_VALUE: 22
PIF_VALUE: 30
PIF_VALUE: 31
PIF_VALUE: 22
PIF_VALUE: 22
PIF_VALUE: 30
PIF_VALUE: 0
PIF_VALUE: 30
PIF_VALUE: 30
PIF_VALUE: 38
PIF_VALUE: 14
PIF_VALUE: 1
PIF_VALUE: 27
PIF_VALUE: 30
PIF_VALUE: 13
PIF_VALUE: 0

## 2021-05-04 ASSESSMENT — PAIN SCALES - GENERAL
PAINLEVEL_OUTOF10: 0
PAINLEVEL_OUTOF10: 8
PAINLEVEL_OUTOF10: 9
PAINLEVEL_OUTOF10: 6
PAINLEVEL_OUTOF10: 0

## 2021-05-04 ASSESSMENT — PAIN DESCRIPTION - FREQUENCY
FREQUENCY: INTERMITTENT
FREQUENCY: CONTINUOUS

## 2021-05-04 ASSESSMENT — PAIN DESCRIPTION - ORIENTATION
ORIENTATION: RIGHT;MID;UPPER
ORIENTATION: MID

## 2021-05-04 ASSESSMENT — PAIN DESCRIPTION - PROGRESSION
CLINICAL_PROGRESSION: GRADUALLY WORSENING
CLINICAL_PROGRESSION: GRADUALLY WORSENING

## 2021-05-04 ASSESSMENT — PAIN DESCRIPTION - PAIN TYPE
TYPE: ACUTE PAIN
TYPE: ACUTE PAIN

## 2021-05-04 ASSESSMENT — PAIN DESCRIPTION - LOCATION
LOCATION: ABDOMEN
LOCATION: ABDOMEN

## 2021-05-04 ASSESSMENT — PAIN DESCRIPTION - ONSET
ONSET: ON-GOING
ONSET: GRADUAL

## 2021-05-04 NOTE — PROGRESS NOTES
pt. reports takes Lamictal 50 mg daily and our orders is only 25 mg .  Perfect serve sent to DR Anita Rogers.

## 2021-05-04 NOTE — PROGRESS NOTES
Pt lost IV acess, 5 attempts were made by 2 different nurses with no success.  Clinical called for assistance

## 2021-05-04 NOTE — BRIEF OP NOTE
Brief Postoperative Note      Patient: Mariluz Burr  YOB: 1975  MRN: [id number]    Date of Procedure: 5/4/2021    Pre-Op Diagnosis: APPENDICITIS    Post-Op Diagnosis: Same       Procedure:  Laparoscopic appendectomy with partial cecectomy    Surgeon(s):  Asia Arias MD    Assistant:  Surgical Assistant: Tyler Valderrama    Anesthesia: General    Estimated Blood Loss (mL): Minimal    Complications: None    Specimens:   ID Type Source Tests Collected by Time Destination   A : Appendix and portion of cecum Tissue Tissue SURGICAL PATHOLOGY Asia Arias MD 5/4/2021 1250        Implants:  * No implants in log *      Drains: * No LDAs found *    Findings: As above    Electronically signed by Mickey Miller MD on 5/4/2021 at 1:11 PM

## 2021-05-04 NOTE — PROGRESS NOTES
05/04/21 1614   Incentive Spirometry Tx   Treatment Tolerance Well   Incentive Spirometry Goal (mL) 1000 mL   Incentive Spirometry Achieved (mL) 1250 mL

## 2021-05-04 NOTE — PROGRESS NOTES
Discharge order received. Patient informed of discharge order. Discharge instructions reviewed with patient . Copy of discharge instructions given to patient. Signed prescriptions given to patient. Patientverbalized understanding, denies needs or questions at this time. IV  removed. All patient belongings packed and sent with patient upon discharge.  Patient left with family in car

## 2021-05-04 NOTE — PROGRESS NOTES
Pt A/O in bed and quiet. Denies any needs at this time. SR up x2, bed in lowest position, wheels locked,bed alarm on, Call light and bedside table in easy reach.    Juan Jose Du RN

## 2021-05-04 NOTE — PROGRESS NOTES
The writer of this note witnessed the day shift nurse wipe the patient per protocol with cholrhexadine wipes before leaving for the night

## 2021-05-04 NOTE — ANESTHESIA PRE PROCEDURE
Department of Anesthesiology  Preprocedure Note       Name:  Kraig Dear   Age:  42 y.o.  :  1975                                          MRN:  [id number]         Date:  2021      Surgeon:  Richard Cleveland MD    Procedure:  LAPAROSCOPIC APPENDECTOMY    HPI:   42 y.o. female who presented with severe, sharp lower abdominal pain that started 2 days ago. She presented to [facility] ED yesterday. No alleviating or modifying factors at home. No previous similar symptoms. She had associated nausea. Pain radiated to and has localized to the RLQ. CT at [facility] showed: Appendix dilated and has inflammation around it. EK-SEP-2019 Normal sinus rhythm 75; 1st degree AV Block; Normal axis; no acute ischemic changes; when compared with ECG of 15-SEP-2019: Vent. rate has decreased BY 51 BPM    Stress Test:  2021  Negative for ischemia    ECHO:     Normal global left ventricular size and systolic function with no obvious regional wall motion abnormalities.   Normal right ventricular size and function.   Normal left atrial size.   Mild-moderate tricuspid regurgitation.   Normal size aortic root and proximal ascending aorta. Medications prior to admission:    traMADol (ULTRAM) 50 MG tablet Take 50 mg by mouth every 6 hours as needed for Pain.   meloxicam (MOBIC) 7.5 MG tablet Take 7.5 mg by mouth daily   amitriptyline (ELAVIL) 50 MG tablet Take 75 mg by mouth nightly    aspirin 81 MG EC tablet Take by mouth daily   atorvastatin (LIPITOR) 10 MG tablet Take 10 mg by mouth daily   budesonide-formoterol (SYMBICORT) 160-4.5 MCG/ACT AERO Inhale 2 puffs into the lungs 2 times daily   carvedilol (COREG) 6.25 MG tablet Take 6.25 mg by mouth 2 times daily   docusate (COLACE, DULCOLAX) 100 MG CAPS Take 100 mg by mouth 2 times daily as needed    furosemide (LASIX) 40 MG tablet Take 40 mg by mouth daily   gabapentin (NEURONTIN) 300 MG capsule Take 300 mg by mouth 3 times daily.    HYDROXYZINE HCL PO Take 50 mg by mouth 2 times daily    levocetirizine (XYZAL) 5 MG tablet Take 5 mg by mouth nightly   lamoTRIgine (LAMICTAL) 25 MG tablet Take 50 mg by mouth daily    QUEtiapine (SEROQUEL) 25 MG tablet Take 25 mg by mouth daily as needed    OXYGEN Inhale into the lungs   paliperidone (INVEGA) 9 MG extended release tablet Take 9 mg by mouth every morning   ondansetron (ZOFRAN) 4 MG tablet Take 4 mg by mouth every 8 hours as needed for Nausea or Vomiting   tiotropium (SPIRIVA RESPIMAT) 2.5 MCG/ACT AERS inhaler Inhale 2 puffs into the lungs daily   azithromycin (ZITHROMAX Z-MARIA T) 250 MG tablet Take 2 tablets (500 mg) on Day 1, and then take 1 tablet (250 mg) on days 2 through 5.   cyclobenzaprine (FLEXERIL) 10 MG tablet Take 10 mg by mouth 2 times daily as needed for Muscle spasms    escitalopram (LEXAPRO) 20 MG tablet Take 20 mg by mouth daily   albuterol sulfate HFA (VENTOLIN HFA) 108 (90 Base) MCG/ACT inhaler Inhale 2 puffs into the lungs every 6 hours as needed for Shortness of Breath   omeprazole (PRILOSEC) 40 MG delayed release capsule 2 times daily    busPIRone (BUSPAR) 10 MG tablet Pt not taking   fluticasone (FLONASE) 50 MCG/ACT nasal spray 1 spray by Each Nostril route daily  Patient not taking: Reported on 3/31/2021     Current medications:     budesonide-formoterol (SYMBICORT) 160-4.5 MCG/ACT inhaler 2 puff  2 puff Inhalation BID    carvedilol (COREG) tablet 6.25 mg  6.25 mg Oral BID    tiotropium (SPIRIVA RESPIMAT) 2.5 MCG/ACT inhaler 2 puff  2 puff Inhalation Daily    promethazine (PHENERGAN) injection 6.25 mg  6.25 mg Intravenous Q6H PRN    HYDROmorphone (DILAUDID) injection 0.5 mg  0.5 mg Intravenous Q3H PRN    ketorolac (TORADOL) injection 30 mg  30 mg Intravenous Q8H PRN    famotidine (PEPCID) injection 20 mg  20 mg Intravenous BID    enoxaparin (LOVENOX) injection 40 mg  40 mg Subcutaneous Q24H    ondansetron (ZOFRAN) injection 4 mg  4 mg Intravenous Q6H PRN    acetaminophen (TYLENOL) tablet 650 mg  650 Pulse: 99 86   Resp: 17 16   Temp: 97.1 °F (36.2 °C) 98 °F (36.7 °C)   TempSrc: Oral Oral   SpO2: 99% 93%            BP Readings from Last 3 Encounters:   05/04/21 112/74   03/31/21 95/63   08/16/20 117/67     NPO Status: >8 hrs                      BMI:   Wt Readings from Last 3 Encounters:   03/31/21 213 lb 9.6 oz (96.9 kg)   08/16/20 205 lb (93 kg)   09/15/19 175 lb (79.4 kg)       POC-FBS:  (5/4/2021  11:12)  84    CBC:    5/4/2021 06:14   WBC 14.9 (H)   Hemoglobin Quant 12.5   Hematocrit 38.2   Platelet Count 950     CMP:    5/4/2021 06:14   Sodium 138   Potassium 3.8   Chloride 105   CO2 26   BUN 11   Creatinine 0.8   Magnesium 2.10   Glucose 94   Calcium 8.9   Phosphorus 3.4     COVID-19 Screening (If Applicable): Anesthesia Evaluation  Patient summary reviewed and Nursing notes reviewed  Airway: Mallampati: III  TM distance: >3 FB   Neck ROM: limited  Comment: Thick neck girth  Mouth opening: > = 3 FB Dental:          Pulmonary: breath sounds clear to auscultation  (+) COPD:  sleep apnea:  asthma:     (-) wheezes                           Cardiovascular:  Exercise tolerance: good (>4 METS),       (-) past MI, CABG/stent,  angina,  DEL CASTILLO and murmur      Rhythm: regular  Rate: normal                    Neuro/Psych:   (+) neuromuscular disease:, psychiatric history:   (-) TIA and CVA            ROS comment: H/O RSD/CRPS    +Bipolar 1 d/o    +PTSD (post-traumatic stress disorder)    +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 MIPS: Prophylactic antiemetics administered. Anesthetic plan and risks discussed with patient.       Plan discussed with CRNA.             Maryan Wetzel MD

## 2021-05-04 NOTE — PROGRESS NOTES
Pt A/O in bed and quiet. Denies any needs at this time. SR up x2, bed in lowest position, wheels locked,bed alarm on, Call light and bedside table in easy reach.    Diya Wright RN

## 2021-05-04 NOTE — H&P
[facility] SURGERY    Department of General Surgery History & Physical    PATIENT NAME: Janina Anne   YOB: 1975    ADMISSION DATE: 5/3/2021  6:22 PM      TODAY'S DATE: 2021    Reason for admission:  Appendicitis      HISTORY OF PRESENT ILLNESS:              The patient is a 42 y.o. female who presents with severe, sharp lower abdominal pain that started 2 days ago. She presented to [facility] ED yesterday. No alleviating or modifying factors at home. No previous similar symptoms. She had associated nausea. Pain radiated to and has localized to the RLQ.      Past Medical History:        Diagnosis Date    Arthritis     Asthma     [facility]     Chronic constipation     [facility] (chronic obstructive pulmonary disease) ([facility])     CRPS (complex regional pain syndrome type II)     Dental abscess     GERD (gastroesophageal reflux disease)     Hx of blood clots     Low back pain     Orthostatic hypotension     Osteoporosis     Peptic ulcer disease     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 ([facility]     Psychosis ([facility]     PTSD (post-traumatic stress disorder)        Past Surgical History:        Procedure Laterality Date     SECTION         Current Medications:   Current Facility-Administered Medications: budesonide-formoterol (SYMBICORT) 160-4.5 MCG/ACT inhaler 2 puff, 2 puff, Inhalation, BID  carvedilol (COREG) tablet 6.25 mg, 6.25 mg, Oral, BID  tiotropium (SPIRIVA RESPIMAT) 2.5 MCG/ACT inhaler 2 puff, 2 puff, Inhalation, Daily  0.9 % sodium chloride infusion, , Intravenous, Continuous  promethazine (PHENERGAN) injection 6.25 mg, 6.25 mg, Intravenous, Q6H PRN  HYDROmorphone (DILAUDID) injection 0.5 mg, 0.5 mg, Intravenous, Q3H PRN  ketorolac (TORADOL) injection 30 mg, 30 mg, Intravenous, Q8H PRN  famotidine (PEPCID) injection 20 mg, 20 mg, Intravenous, BID  enoxaparin (LOVENOX) injection 40 mg, 40 mg, Subcutaneous, Q24H  ondansetron (ZOFRAN) injection 4 mg, 4 mg, Intravenous, Q6H PRN  acetaminophen (TYLENOL) tablet 650 mg, 650 mg, Oral, Q6H PRN  meropenem (MERREM) 1,000 mg in sodium chloride 0.9 % 100 mL IVPB (mini-bag), 1,000 mg, Intravenous, Q8H  lamoTRIgine (LAMICTAL) tablet 50 mg, 50 mg, Oral, Daily  Prior to Admission medications    Medication Sig Start Date End Date Taking? Authorizing Provider   traMADol (ULTRAM) 50 MG tablet Take 50 mg by mouth every 6 hours as needed for Pain. Yes Historical Provider, MD   meloxicam (MOBIC) 7.5 MG tablet Take 7.5 mg by mouth daily   Yes Historical Provider, MD   amitriptyline (ELAVIL) 50 MG tablet Take 75 mg by mouth nightly  2/25/21  Yes Historical Provider, MD   aspirin 81 MG EC tablet Take by mouth daily   Yes Historical Provider, MD   atorvastatin (LIPITOR) 10 MG tablet Take 10 mg by mouth daily 2/3/21  Yes Historical Provider, MD   budesonide-formoterol (SYMBICORT) 160-4.5 MCG/ACT AERO Inhale 2 puffs into the lungs 2 times daily 2/22/21  Yes Historical Provider, MD   carvedilol (COREG) 6.25 MG tablet Take 6.25 mg by mouth 2 times daily 2/22/21  Yes Historical Provider, MD   docusate (COLACE, DULCOLAX) 100 MG CAPS Take 100 mg by mouth 2 times daily as needed  8/15/19  Yes Historical Provider, MD   furosemide (LASIX) 40 MG tablet Take 40 mg by mouth daily 2/22/21  Yes Historical Provider, MD   gabapentin (NEURONTIN) 300 MG capsule Take 300 mg by mouth 3 times daily.  2/25/21  Yes Historical Provider, MD   HYDROXYZINE HCL PO Take 50 mg by mouth 2 times daily    Yes Historical Provider, MD   levocetirizine (XYZAL) 5 MG tablet Take 5 mg by mouth nightly   Yes Historical Provider, MD   lamoTRIgine (LAMICTAL) 25 MG tablet Take 50 mg by mouth daily    Yes Historical Provider, MD   QUEtiapine (SEROQUEL) 25 MG tablet Take 25 mg by mouth daily as needed    Yes Historical Provider, MD   OXYGEN Inhale into the lungs   Yes Historical Provider, MD   paliperidone (INVEGA) 9 MG extended release tablet Take 9 mg by mouth every morning   Yes Historical Provider, MD   ondansetron (ZOFRAN) 4 MG tablet Take 4 mg by mouth every 8 hours as needed for Nausea or Vomiting   Yes Historical Provider, MD   tiotropium (SPIRIVA RESPIMAT) 2.5 MCG/ACT AERS inhaler Inhale 2 puffs into the lungs daily 3/31/21  Yes Luis A Mike MD   azithromycin (ZITHROMAX Z-MARIA T) 250 MG tablet Take 2 tablets (500 mg) on Day 1, and then take 1 tablet (250 mg) on days 2 through 5. 9/16/19  Yes Thuy Lancaster DO   cyclobenzaprine (FLEXERIL) 10 MG tablet Take 10 mg by mouth 2 times daily as needed for Muscle spasms    Yes Historical Provider, MD   escitalopram (LEXAPRO) 20 MG tablet Take 20 mg by mouth daily   Yes Historical Provider, MD   albuterol sulfate HFA (VENTOLIN HFA) 108 (90 Base) MCG/ACT inhaler Inhale 2 puffs into the lungs every 6 hours as needed for Shortness of Breath 7/17/19  Yes Phoebe Burdick MD   omeprazole (PRILOSEC) 40 MG delayed release capsule 2 times daily  10/26/17  Yes Historical Provider, MD   busPIRone (BUSPAR) 10 MG tablet Pt not taking 3/22/21   Historical Provider, MD   fluticasone (FLONASE) 50 MCG/ACT nasal spray 1 spray by Each Nostril route daily  Patient not taking: Reported on 3/31/2021 9/16/19   Thuy Lancaster DO        Allergies:  Clindamycin/lincomycin, Penicillins, Amoxicillin, Citalopram hydrobromide, and Clavulanic acid    Social History:   TOBACCO:   reports that she has been smoking cigarettes. She has been smoking about 0.50 packs per day. She has never used smokeless tobacco.  ETOH:   reports no history of alcohol use. DRUGS:   reports no history of drug use. Family History:        Problem Relation Age of Onset    Cancer Mother     Cancer Father     Heart Disease Brother        REVIEW OF SYSTEMS:  She reports no complaints related to the eyes, ears , nose throat or mouth. She denies weight loss. No chest pain. No SOB. No urinary complaints. No musculoskeletal complaints.   No skin rashes. No neurologic deficits. No bleeding tendencies. GI complaints include RLQ pain. PHYSICAL EXAM:  VITALS:  /74   Pulse 77   Temp 98.1 °F (36.7 °C) (Oral)   Resp 16   LMP 12/18/2013   SpO2 93%     CONSTITUTIONAL:  alert, no apparent distress and mildly obese  EYES:  sclera clear  ENT:  normocepalic, without obvious abnormality  NECK:  supple, symmetrical, trachea midline   LUNGS:  clear to auscultation  CARDIOVASCULAR:  regular rate and rhythm   ABDOMEN:     non-distended, tenderness noted in the right lower quadrant,and soft  MUSCULOSKELETAL:  No pitting edema lower extremities  NEUROLOGIC:  Mental Status Exam:  Level of Alertness:   awake  Orientation:   person, place, time  SKIN:  no rashes    DATA:    CBC:   Recent Labs     05/03/21  1908 05/04/21  0614   WBC 17.6* 14.9*   HGB 13.1 12.5   HCT 39.1 38.2    281     BMP:    Recent Labs     05/03/21  1908 05/04/21  0614    138   K 3.7 3.8    105   CO2 25 26   BUN 9 11   CREATININE 0.8 0.8   GLUCOSE 108* 94         Radiology Review:  CT at [facility] reviewed. Appendix dilated and has inflammation around it. ASSESSMENT:  Appendicitis  Personal history seizure disorder  COPD    PLAN:  The diagnosis and recommended procedure were explained. Questions answered. Prepare for appendix surgery. Greater than 50% visit spent counseling about diagnosis, treatment plan and expected post operative course.           [address] [facility]

## 2021-05-04 NOTE — CARE COORDINATION
Case Management Assessment  Initial Evaluation      Patient Name: Jon Miranda  YOB: 1975  Diagnosis: Appendicitis David Flores  Date / Time: 5/3/2021  6:22 PM    Admission status/Date: OBS  Chart Reviewed: Yes      Patient Interviewed: Yes   Family Interviewed:  No      Hospitalization in the last 30 days:  No      Health Care Decision Maker :   Ivy Bailey ([phone]) son, Juani Garcia, Vaibhav Sy (domestic partner) [phone]       Met with: patient  Lisa Sheth conducted  (bedside/phone): bedside    Current PCP: Evie Oleary (APRN)    Financial  [employer] Medicare  Precert required for SNF : Y, N          3 night stay required - Y, N    ADLS  Support Systems/Care Needs: Spouse/Significant Other, Children  Transportation: self    Meal Preparation: self    Housing  Living Arrangements: lives w/SO Steps: none  Intent for return to present living arrangements: Yes  Identified Issues: NA    Home Care Information  Active with [address] : No Agency:(Services)  Type of Home Care Services: None  Passport/Waiver : No  :                      Phone Number:    Passport/Waiver Services: NA          Durable Medical Equiptment   DME Provider: VANITA  Equipment: NA  Walker___Cane_X__RTS___ BSC___Shower Chair___Hospital Bed___W/C____Other________  02 at ____Liter(s)---wears(frequency)_______ HHN ___ CPAP___ BiPap___   N/A____      Home O2 Use :  No      Informed of need for care provider to bring portable home O2 tank on day of discharge for nursing to connect prior to leaving:   Not Indicated  Verbalized agreement/Understanding:   Not Indicated    Community Service Affiliation  Dialysis:  No    · Agency:  · Location:  · Dialysis Schedule:  · Phone:   · Fax: Other Community Services: (ex:PT/OT,Mental Health,Wound Clinic, Cardio/Pul [facility] [employer])    DISCHARGE PLAN: Explained Case Management role/services. Chart reviewed. Met with pt and explained the role of the CM.  Plans to return home. Denies any new HHC or DME needs.  NNNF

## 2021-05-04 NOTE — PLAN OF CARE
Problem: Falls - Risk of:  Goal: Will remain free from falls  Description: Will remain free from falls  Outcome: Ongoing     Problem: Pain:  Goal: Patient's pain/discomfort is manageable  Description: Patient's pain/discomfort is manageable  Outcome: Ongoing

## 2021-05-04 NOTE — PROGRESS NOTES
Patient arrived to unit in stable condition, post op vitals started. surgical incision x 3 to mid ABD dressing C/D/I . No needs noted at this time, will continue to monitor.

## 2021-05-04 NOTE — PROGRESS NOTES
Pt arrived to floor A&O x 4  pt C/O pain notified pt med's needs to be verified by pharmacy  before able to gave anything for pain. reviewed pt home med list. IV in place to left [facility] Call Light in reach. able to make needs known.

## 2021-05-04 NOTE — PROGRESS NOTES
Patient complains of pain at level 9/10. Patient describes pain as sharp to ABD . Patient requests pain medication. Patient medicated with Dilaudid per PRN orders at 1058 pt also c/o n/v Zofran given at 1105  . Will continue to monitor.

## 2021-05-04 NOTE — PROGRESS NOTES
.Pt brought to 2W A/O, admission questions completed, and pt oriented to room. Pt questions answered at this time. Patient admitted to room _217___ from ER. Patient oriented to room, call light, bed rails, phone, lights and bathroom. Patient instructed about the schedule of the day including: vital sign frequency, lab draws, possible tests, frequency of MD and staff rounds, daily weights, I &O's and prescribed diet. Bed alarm on. Bed locked, in lowest position, side rails up 2/4, call light within reach. Recliner Assessment:     Patient is able to demonstrate the ability to move from a reclining position to an upright position within the recliner. 4 Eyes Skin Assessment:     The patient is being assess for     I agree that 2 RN's have performed a thorough Head to Toe Skin Assessment on the patient. ALL assessment sites listed below have been assessed. Areas assessed by both nurses:   [x]   Head, Face, and Ears   [x]   Shoulders, Back, and Chest, Abdomen  [x]   Arms, Elbows, and Hands   [x]   Coccyx, Sacrum, and Ischium  [x]   Legs, Feet, and Heels  Pt refused 4 eyes, but when being wiped down with chlorhexadine wipes only some scattered bruising was noted. Was unable to view coccyx. Co-signer eSignature: Electronically signed by Tmo Flynn RN on 5/4/21 at 5:20 AM EDT    Does the Patient have Skin Breakdown?   No          Rahul Prevention initiated:  No   Wound Care Orders initiated:  No      [facility] nurse consulted for Pressure Injury (Stage 3,4, Unstageable, DTI, NWPT, Complex wounds)and New or Established Ostomies:  No      Primary Nurse eSignature: Electronically signed by Marvel Osborne RN on 5/3/21 at 10:03 PM EDT

## 2021-05-04 NOTE — ANESTHESIA POSTPROCEDURE EVALUATION
Department of Anesthesiology  Postprocedure Note    Patient: Ros Allen  MRN: [id number]  YOB: 1975  Date of evaluation: 5/4/2021    Procedure Summary     Date: 05/04/21 Room / Location: [place] [address] / [facility]    Anesthesia Start: 1392 Anesthesia Stop: 5906    Procedure: LAPAROSCOPIC APPENDECTOMY (N/A Abdomen) Diagnosis: (APPENDICITIS)    Surgeons: Lew Ruvalcaba MD Responsible Provider: Tati Alonzo MD    Anesthesia Type: general ASA Status: 3          Anesthesia Type: general    Madhavi Phase I: Madhavi Score: 9    Madhavi Phase II:      Last vitals: Reviewed and per EMR flowsheets.      Anesthesia Post Evaluation   Anesthetic Problems: no   Cardiovascular System Stable: yes  Respiratory Function: Airway Patent yes  ETT no  Ventilator no  Level of consciousness: awake, alert and oriented  Post-op pain: adequate analgesia  Hydration Adequate: yes  Nausea/Vomiting:no  Other Issues:     Allegra Womack MD

## 2021-05-10 NOTE — OP NOTE
across the  cecum. This allowed me to remove the appendix as well as a portion of  the cecum and create an anastomosis on the colon at the same time. I  inspected the anastomosis, and there was no evident bleeding. Specimen  was brought out in an Endobag. I inspected the pelvis, the right lower  quadrant up over the liver. There was no evident bleeding or  complication. I deflated the abdomen and removed the trocars. The  fascia at the 12 mm port site was reapproximated with 0 Vicryl suture. Local anesthetic was infiltrated. 4-0 Vicryl was used to reapproximate  the skin at all the incisions. Benzoin and Steri-Strip dressing were  placed. DISPOSITION:  The patient tolerated the procedure without any acute  complication.         Cony Kramer MD    D: 05/10/2021 7:16:56       T: 05/10/2021 7:20:57     [email or web address]  Job#: [id number]     Doc#: [id number]    CC:

## 2021-05-25 NOTE — DISCHARGE SUMMARY
GENERAL SURGERY  Discharge Summary      Pt Name:Loir Patino  MRN: [id number]  YOB: 1975  Primary Care Physician: Sharon Canada  Admit date:  5/3/2021  6:22 PM  Discharge date:  5/4/2021  6:16 PM  Disposition: home  Admitting Diagnosis:   1. 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 A CT was performed at [facility] and revealed a dilated appendix with surrounding inflammation indicative of acute appendicitis. After careful review of the patient's chart, imaging, history and a physical examination, it was determined by Dr. Donald Colmenares the patient would most benefit from a laparoscopic appendectomy with partial cecectomy. Prior to the procedure, all the risks, benefits and alternatives were discussed with the patient and she agreed to proceed with the procedure. At this point, the patient was taken to the operating room where the aforementioned procedures were carried out. The patient tolerated the procedure well and she was transferred to recovery in stable condition. Post-operatively, the patient did very well. She was tolerating a full liquid diet, ambulating, urinating and her pain was controlled with Po pain medications. As a result, she was cleared for discharge to home in stable condition.    At time of discharge, Rahul Weinberg was tolerating a full liquid diet, passing flatus, ambulating on her own accord and had adequate analgesia on oral pain medications, and had no signs of symptoms of complications. PHYSICAL EXAMINATION   Discharge Vitals:  oral temperature is 97 °F (36.1 °C). Her blood pressure is 128/81 and her pulse is 102. Her respiration is 18 and oxygen saturation is 96%. General appearance - alert, well appearing, and in no distress  Abdomen - soft, incisional tenderness only, bowel sounds present  Neurological - motor and sensory grossly normal bilaterally  Incision: no drainage    LABS   No results for input(s): WBC, HGB, HCT, PLT, NA, K, CL, CO2, BUN, CREATININE in the last 72 hours.     DISCHARGE INSTRUCTIONS   Discharge Medications:      Medication List      CONTINUE taking these medications    albuterol sulfate  (90 Base) MCG/ACT inhaler  Commonly known as: Ventolin HFA  Inhale 2 puffs into the lungs every 6 hours as needed for Shortness of Breath     amitriptyline 50 MG tablet  Commonly known as: ELAVIL     aspirin 81 MG EC tablet     atorvastatin 10 MG tablet  Commonly known as: LIPITOR     busPIRone 10 MG tablet  Commonly known as: BUSPAR     carvedilol 6.25 MG tablet  Commonly known as: COREG     cyclobenzaprine 10 MG tablet  Commonly known as: FLEXERIL     docusate 100 MG Caps  Commonly known as: COLACE, DULCOLAX     fluticasone 50 MCG/ACT nasal spray  Commonly known as: FLONASE  1 spray by Each Nostril route daily     furosemide 40 MG tablet  Commonly known as: LASIX     gabapentin 300 MG capsule  Commonly known as: NEURONTIN     HYDROXYZINE HCL PO     Invega 9 MG extended release tablet  Generic drug: paliperidone     lamoTRIgine 25 MG tablet  Commonly known as: LAMICTAL     levocetirizine 5 MG tablet  Commonly known as: XYZAL     Lexapro 20 MG tablet  Generic drug: escitalopram     Mobic 7.5 MG tablet  Generic drug: meloxicam     omeprazole 40 MG delayed release capsule  Commonly known as: PRILOSEC     OXYGEN     QUEtiapine 25 MG tablet  Commonly known as: SEROQUEL     Symbicort 160-4.5 MCG/ACT Aero  Generic drug: budesonide-formoterol     tiotropium 2.5 MCG/ACT Aers inhaler  Commonly known as: Spiriva Respimat  Inhale 2 puffs into the lungs daily     Zofran 4 MG tablet  Generic drug: ondansetron        STOP taking these medications    azithromycin 250 MG tablet  Commonly known as: Zithromax Z-Laith     traMADol 50 MG tablet  Commonly known as: ULTRAM        ASK your doctor about these medications    levoFLOXacin 500 MG tablet  Commonly known as: LEVAQUIN  Take 1 tablet by mouth daily for 10 days  Ask about: Should I take this medication?     metroNIDAZOLE 500 MG tablet  Commonly known as: FLAGYL  Take 1 tablet by mouth 3 times daily for 10 days  Ask about: Should I take this medication?     oxyCODONE 5 MG immediate release tablet  Commonly known as: ROXICODONE  Take 1 tablet by mouth every 4 hours as needed for Pain for up to 5 days. Ask about: Should I take this medication? Where to Get Your Medications      You can get these medications from any pharmacy    Bring a paper prescription for each of these medications  · levoFLOXacin 500 MG tablet  · metroNIDAZOLE 500 MG tablet  · oxyCODONE 5 MG immediate release tablet       Diet: Home on full liquids then, may advance diet as tolerated  Activity: no lifting more than 10-20 lbs for next two weeks  Wound Care: may remove outer clear dressigns and gauze in 1-2 days and leave steri strips open to air. Follow-up:  in 2 weeks with Dr. Zhao Starks. Time Spent for discharge: 30 minutes      Electronically signed by BELA Fitch CNP on 5/25/2021 at 3:53 PM      This patient is being prescribed a dose of opioid pain medication greater than 30 MED due to excessive pain from recent surgery or an acute inflammatory process.

## 2021-06-01 ENCOUNTER — TELEPHONE (OUTPATIENT)
Dept: BARIATRICS/WEIGHT MGMT | Age: 46
End: 2021-06-01

## 2021-06-01 NOTE — TELEPHONE ENCOUNTER
Spoke with pt. Info given  Pt verbalized understanding. Appt sched. Np pk mailed. Per pt No HX of WLS.   BMI > 35

## 2021-06-02 DIAGNOSIS — J44.1 COPD EXACERBATION (HCC): ICD-10-CM

## 2021-06-03 RX ORDER — TIOTROPIUM BROMIDE INHALATION SPRAY 3.12 UG/1
SPRAY, METERED RESPIRATORY (INHALATION)
Qty: 1 INHALER | Refills: 5 | Status: SHIPPED | OUTPATIENT
Start: 2021-06-03 | End: 2021-09-16 | Stop reason: SDUPTHER

## 2021-08-10 ENCOUNTER — TELEPHONE (OUTPATIENT)
Dept: BARIATRICS/WEIGHT MGMT | Age: 46
End: 2021-08-10

## 2021-08-10 NOTE — TELEPHONE ENCOUNTER
Called as a new pt courtesy call - spoke w patient. Did not receive paperwork. Pt unable to print paperwork, so pt is arriving @ 830 @ [place] location to fill out nppw.

## 2021-08-12 ENCOUNTER — OFFICE VISIT (OUTPATIENT)
Dept: BARIATRICS/WEIGHT MGMT | Age: 46
End: 2021-08-12
Payer: MEDICARE

## 2021-08-12 VITALS
WEIGHT: 209 LBS | HEART RATE: 91 BPM | BODY MASS INDEX: 35.68 KG/M2 | DIASTOLIC BLOOD PRESSURE: 71 MMHG | HEIGHT: 64 IN | SYSTOLIC BLOOD PRESSURE: 100 MMHG | RESPIRATION RATE: 18 BRPM

## 2021-08-12 DIAGNOSIS — F43.10 PTSD (POST-TRAUMATIC STRESS DISORDER): Chronic | ICD-10-CM

## 2021-08-12 DIAGNOSIS — G47.33 OBSTRUCTIVE SLEEP APNEA: ICD-10-CM

## 2021-08-12 DIAGNOSIS — E78.2 MIXED HYPERLIPIDEMIA: ICD-10-CM

## 2021-08-12 DIAGNOSIS — E66.01 SEVERE OBESITY (BMI 35.0-35.9 WITH COMORBIDITY) (HCC): Primary | ICD-10-CM

## 2021-08-12 DIAGNOSIS — F17.200 CURRENT SMOKER: ICD-10-CM

## 2021-08-12 DIAGNOSIS — I10 ESSENTIAL HYPERTENSION: ICD-10-CM

## 2021-08-12 DIAGNOSIS — K21.9 CHRONIC GERD: ICD-10-CM

## 2021-08-12 DIAGNOSIS — Z01.818 PREOPERATIVE CLEARANCE: ICD-10-CM

## 2021-08-12 DIAGNOSIS — K22.70 BARRETT'S ESOPHAGUS WITHOUT DYSPLASIA: ICD-10-CM

## 2021-08-12 PROCEDURE — 4004F PT TOBACCO SCREEN RCVD TLK: CPT | Performed by: SURGERY

## 2021-08-12 PROCEDURE — G8417 CALC BMI ABV UP PARAM F/U: HCPCS | Performed by: SURGERY

## 2021-08-12 PROCEDURE — 99214 OFFICE O/P EST MOD 30 MIN: CPT | Performed by: SURGERY

## 2021-08-12 PROCEDURE — G8427 DOCREV CUR MEDS BY ELIG CLIN: HCPCS | Performed by: SURGERY

## 2021-08-12 NOTE — PROGRESS NOTES
Joseph Medeiros is a 42 y.o. female with a date of birth of 1975. Vitals:    08/12/21 0922   BP: 100/71   Pulse: 91   Resp: 18    BMI: Body mass index is 35.87 kg/m². Obesity Classification: Class II    Weight History: Wt Readings from Last 3 Encounters:   08/12/21 209 lb (94.8 kg)   03/31/21 213 lb 9.6 oz (96.9 kg)   08/16/20 205 lb (93 kg)       Patient's lowest adult weight was 125 lbs at age 47. Patient's highest adult weight was 218 lbs at age 42. Patient has participated in the following weight loss programs: Weight Watcher Anonymous, low fat, low carb and sugarbusters. Patient has participated in meal replacement/liquid diets -PT ISCURRENTLY DOING ALL SLIMFAST DIET. Patient has not participated in weight loss medications. Patient is  lactose intolerant - milk only. Patient does not have Sikh/cultural food concerns. Patient does have food allergies - SEAFOOD, TOMATO PRODUCTS. Patient does tolerate artificial sweeteners. Patient does have a regular sleep/wake schedule; she has sleep apnea but does not use cpap  24 hour recall/food frequency chart:  Breakfast: no.   Snack: no.   Lunch: yes. Sausage, egg and cheese bagel OR sausage, pancakes OR biscuits and gravy  Snack: no.   Dinner: yes. Baked chix with butter, baked beans, potatoes or mac and cheese, salad  Snack: no.   Drinks throughout the day: coffee 1c with flavored creamer, water - 64oz; 1 can Mtn Dew daily  Do you drink alcohol? No.     Patient does not meet the criteria for binge eating disorder. Patient does not have grazing. Patient does not have night eating. Patient does not have a history of emotional eating or eating out of boredom. Surgery  Patient does feel confident in her ability to make these changes. The patient's expectations of post-surgical eating habits are realistic. Patient states she does understand the consequences of not complying with post-op food guidelines.   Patient states she does understands the long term changes in food intake that will be necessary for all occasions after surgery for the rest of her life. Patient is deemed nutritionally appropriate to proceed. Goals  Weight: 125-135  Health Improvement: sleep apnea, Moran's esophagus, GERD, heart valve leak, hiatal hernia, COPD - smokes 1/2 pack, back pain, HTN, high cholesterol    Assessment  Nutritional Needs: RMR=(9.99 x 95) + (6.25 x 163) - (4.92 x 46 y.o.) -161  = 1591 kcal x 1.4 (sedentary activity factor)= 2227 kcal - 1000 (for 2 lb weight loss/week)= 1227 kcal.    Plan  Plan/Recommendations: Start presurgical guidelines. Goals:   -Eat 4-5 times daily  -Avoid high fat and high sugar foods  -Include protein with all meals and snacks  -Avoid carbonation and caffeine  -Avoid calorie containing beverages  -Increase physical activity as tolerated    PES Statement:  Overweight/Obesity related to lack of exercise, sedentary lifestyle, unhealthy eating habits, and unsuccessful diet attempts as evidenced by BMI. Body mass index is 35.87 kg/m². Will follow up as necessary.     Gabby Johnston, ANGELO, LD tympanic

## 2021-08-12 NOTE — PROGRESS NOTES
[facility]) Physicians   [facility]  Catalina Novoa MD, [address] [facility], [address] [place] [address]    Katherine Barrientos [address] . Phone: [phone]  Fax: [phone]       Medical Weight Loss Consultation         Chief Complaint   Patient presents with    Bariatric, Initial Visit     NP ANTONIO PRAKASH         HPI:    Lin Aguayo is a very pleasant 42 y.o. obese female ,   Body mass index is 35.87 kg/m². And multiple medical problems who is presenting via telemedicine for weight loss evaluation and consultation by Dr. Hitesh Jordan. Patient has been struggling for several years now with obesity. Patient feels the weight is an obstacle to achieve and perform things in  daily living and is posing risk on health. Tries to diet, and exercise but can't keep the weight off. Patient tried Weight Watcher Anonymous, low fat, low carb and sugarbusters. Patient has participated in meal replacement/liquid diets -PT ISCURRENTLY DOING ALL SLIMFAST DIET. Patient has not participated in weight loss medications and other regimens, but with no sustainable weight loss. Patient  is very determined to lose weight and be healthy, and is interested in joining our weight loss program to achieve this goal.    Otherwise patient denies any nausea, vomiting, fevers, chills, shortness of breath, chest pain, constipation or urinary symptoms.       Pain Assessment   Denies any abdominal pain     Past Medical History:   Diagnosis Date    Arthritis     Asthma     Bipolar 1 disorder (HCC)     Chronic constipation     [facility] pulmonary disease) [facility]     CRPS (complex regional pain syndrome type II)     Dental abscess     Essential hypertension 8/12/2021    GERD (gastroesophageal reflux disease)     Hx of blood clots     Low back pain     Mixed hyperlipidemia 8/12/2021    Orthostatic hypotension     Osteoporosis     Peptic ulcer disease     Personality disorder with mouth 2 times daily, Disp: , Rfl:     docusate (COLACE, DULCOLAX) 100 MG CAPS, Take 100 mg by mouth 2 times daily as needed , Disp: , Rfl:     furosemide (LASIX) 40 MG tablet, Take 40 mg by mouth daily, Disp: , Rfl:     gabapentin (NEURONTIN) 300 MG capsule, Take 300 mg by mouth 3 times daily. , Disp: , Rfl:     HYDROXYZINE HCL PO, Take 50 mg by mouth 2 times daily , Disp: , Rfl:     levocetirizine (XYZAL) 5 MG tablet, Take 5 mg by mouth nightly, Disp: , Rfl:     lamoTRIgine (LAMICTAL) 25 MG tablet, Take 50 mg by mouth daily , Disp: , Rfl:     QUEtiapine (SEROQUEL) 25 MG tablet, Take 25 mg by mouth daily as needed , Disp: , Rfl:     OXYGEN, Inhale into the lungs, Disp: , Rfl:     paliperidone (INVEGA) 9 MG extended release tablet, Take 9 mg by mouth every morning, Disp: , Rfl:     ondansetron (ZOFRAN) 4 MG tablet, Take 4 mg by mouth every 8 hours as needed for Nausea or Vomiting, Disp: , Rfl:     cyclobenzaprine (FLEXERIL) 10 MG tablet, Take 10 mg by mouth 2 times daily as needed for Muscle spasms , Disp: , Rfl:     escitalopram (LEXAPRO) 20 MG tablet, Take 20 mg by mouth daily, Disp: , Rfl:     omeprazole (PRILOSEC) 40 MG delayed release capsule, 2 times daily , Disp: , Rfl:     SPIRIVA RESPIMAT 2.5 MCG/ACT AERS inhaler, INHALE 2 PUFFS INTO THE LUNGS DAILY, Disp: 1 Inhaler, Rfl: 5    busPIRone (BUSPAR) 10 MG tablet, Pt not taking, Disp: , Rfl:     fluticasone (FLONASE) 50 MCG/ACT nasal spray, 1 spray by Each Nostril route daily (Patient not taking: Reported on 3/31/2021), Disp: 1 Bottle, Rfl: 1    albuterol sulfate HFA (VENTOLIN HFA) 108 (90 Base) MCG/ACT inhaler, Inhale 2 puffs into the lungs every 6 hours as needed for Shortness of Breath, Disp: 1 Inhaler, Rfl: 0      Review of Systems - History obtained from the patient  General ROS: negative  Psychological ROS: negative  Ophthalmic ROS: negative  Neurological ROS: negative  ENT ROS: negative  Allergy and Immunology ROS: negative  Hematological and Lymphatic ROS: negative  Endocrine ROS: negative  Breast ROS: negative  Respiratory ROS: negative  Cardiovascular ROS: negative  Gastrointestinal ROS:negative  Genito-Urinary ROS: negative  Musculoskeletal ROS: joint pain  Skin ROS: negative      Physical Exam   Constitutional: Patient is oriented to person, place, and time. Vital signs are normal. Patient  appears well-developed and well-nourished. Patient  is active and cooperative. Non-toxic appearance. No distress. HENT:   Head: Normocephalic and atraumatic. Head is without laceration. Right Ear: External ear normal. No lacerations. No drainage, swelling or tenderness. Left Ear: External ear normal. No lacerations. No drainage, swelling or tenderness. Nose, Mouth/Throat: Patient is wearing mask due to Covid-19 pandemic precautions, following CDC and health authorities guidelines. Eyes: Conjunctivae, EOM and lids are normal. Pupils are equal, round, and reactive to light. Right eye exhibits no discharge. No foreign body present in the right eye. Left eye exhibits no discharge. No foreign body present in the left eye. No scleral icterus. Neck: Trachea normal and normal range of motion. Neck supple. No JVD present. No tracheal tenderness present. Carotid bruit is not present. No rigidity. No tracheal deviation and no edema present. No thyromegaly present. Cardiovascular: Normal rate, regular rhythm, normal heart sounds, intact distal pulses and normal pulses. Pulmonary/Chest: Effort normal and breath sounds normal. No stridor. No respiratory distress. Patient  has no wheezes. Patient has no rales. Patient exhibits no tenderness and no crepitus. Abdominal: Soft. Normal appearance and bowel sounds are normal. Patient exhibits no distension, no abdominal bruit, no ascites and no mass. There is no hepatosplenomegaly. There is no tenderness. There is no rigidity, no rebound, no guarding and no CVA tenderness. No hernia.  Hernia confirmed negative in the ventral area. Musculoskeletal: Normal range of motion. Patient exhibits no edema or tenderness. Lymphadenopathy:        Head (right side): No submental, no submandibular, no preauricular, no posterior auricular and no occipital adenopathy present. Head (left side): No submental, no submandibular, no preauricular, no posterior auricular and no occipital adenopathy present. Patient  has no cervical adenopathy. Right: No supraclavicular adenopathy present. Left: No supraclavicular adenopathy present. Neurological: Patient is alert and oriented to person, place, and time. Patient has normal strength. Coordination and gait normal. GCS eye subscore is 4. GCS verbal subscore is 5. GCS motor subscore is 6. Skin: Skin is warm and dry. No abrasion and no rash noted. Patient  is not diaphoretic. No cyanosis or erythema. Psychiatric: Patient has a normal mood and affect. speech is normal and behavior is normal. Cognition and memory are normal.       Keaton Vo was seen today for bariatric, initial visit. Diagnoses and all orders for this visit:    Severe obesity (BMI 35.0-35.9 with comorbidity) (HCC)    Preoperative clearance    Essential hypertension    Mixed hyperlipidemia    PTSD (post-traumatic stress disorder)    Obstructive sleep apnea    Current smoker    Chronic GERD    Moran's esophagus without dysplasia          A/P    Marcelle Myrick is a very pleasant 42 y.o. female with Obesity,  Body mass index is 35.87 kg/m². and multiple obesity related co-morbidities. Marcelle Myrick is very motivated to lose weight and being more healthy. The patient underwent extensive dietary counseling. I have reviewed, discussed and agree with the dietary plan and coordinated treatment plan with dietitian. Co-morbidities include but not limited to,  specific to the patient obesity;     Patient Active Problem List   Diagnosis    Psychosis ([facility]    PTSD (post-traumatic stress disorder)   

## 2021-08-19 ENCOUNTER — OFFICE VISIT (OUTPATIENT)
Dept: PULMONOLOGY | Age: 46
End: 2021-08-19
Payer: MEDICARE

## 2021-08-19 VITALS
OXYGEN SATURATION: 95 % | RESPIRATION RATE: 18 BRPM | HEIGHT: 64 IN | HEART RATE: 85 BPM | WEIGHT: 210.6 LBS | TEMPERATURE: 97.4 F | DIASTOLIC BLOOD PRESSURE: 70 MMHG | BODY MASS INDEX: 35.96 KG/M2 | SYSTOLIC BLOOD PRESSURE: 98 MMHG

## 2021-08-19 DIAGNOSIS — G47.33 OBSTRUCTIVE SLEEP APNEA: ICD-10-CM

## 2021-08-19 DIAGNOSIS — F17.200 CURRENT SMOKER: ICD-10-CM

## 2021-08-19 DIAGNOSIS — E66.01 SEVERE OBESITY (BMI 35.0-35.9 WITH COMORBIDITY) (HCC): ICD-10-CM

## 2021-08-19 DIAGNOSIS — J44.1 COPD EXACERBATION (HCC): Primary | ICD-10-CM

## 2021-08-19 PROCEDURE — G8417 CALC BMI ABV UP PARAM F/U: HCPCS | Performed by: INTERNAL MEDICINE

## 2021-08-19 PROCEDURE — G8926 SPIRO NO PERF OR DOC: HCPCS | Performed by: INTERNAL MEDICINE

## 2021-08-19 PROCEDURE — 3023F SPIROM DOC REV: CPT | Performed by: INTERNAL MEDICINE

## 2021-08-19 PROCEDURE — G8427 DOCREV CUR MEDS BY ELIG CLIN: HCPCS | Performed by: INTERNAL MEDICINE

## 2021-08-19 PROCEDURE — 4004F PT TOBACCO SCREEN RCVD TLK: CPT | Performed by: INTERNAL MEDICINE

## 2021-08-19 PROCEDURE — 99214 OFFICE O/P EST MOD 30 MIN: CPT | Performed by: INTERNAL MEDICINE

## 2021-08-19 RX ORDER — ALBUTEROL SULFATE 2.5 MG/3ML
SOLUTION RESPIRATORY (INHALATION)
COMMUNITY
Start: 2021-02-22 | End: 2021-11-09 | Stop reason: SDUPTHER

## 2021-08-19 RX ORDER — OXYCODONE HYDROCHLORIDE AND ACETAMINOPHEN 5; 325 MG/1; MG/1
1 TABLET ORAL EVERY 8 HOURS PRN
COMMUNITY
Start: 2021-08-17 | End: 2021-09-16

## 2021-08-19 RX ORDER — DOXYCYCLINE HYCLATE 100 MG
100 TABLET ORAL 2 TIMES DAILY
Qty: 14 TABLET | Refills: 0 | Status: SHIPPED | OUTPATIENT
Start: 2021-08-19 | End: 2021-08-26

## 2021-08-19 RX ORDER — PREDNISONE 20 MG/1
20 TABLET ORAL DAILY
Qty: 10 TABLET | Refills: 0 | Status: SHIPPED | OUTPATIENT
Start: 2021-08-19 | End: 2021-08-29

## 2021-08-19 NOTE — PATIENT INSTRUCTIONS
Remember to bring a list of pulmonary medications and any CPAP or BiPAP machines to your next appointment with the office. Please keep all of your future appointments scheduled by [address], [facility] office. Out of respect for other patients and providers, you may be asked to reschedule your appointment if you arrive later than your scheduled appointment time. Appointments cancelled less than 24hrs in advance will be considered a no show. Patients with three missed appointments within 1 year or four missed appointments within 2 years can be dismissed from the practice. Please be aware that our physicians are required to work in the Intensive Care Unit at [facility].  Your appointment may need to be rescheduled if they are designated to work during your appointment time. You may receive a survey regarding the care you received during your visit. Your input is valuable to us. We encourage you to complete and return your survey. We hope you will choose us in the future for your healthcare needs. Pt instructed of all future appointment dates & times, including radiology, labs, procedures & referrals. If procedures were scheduled preparation instructions provided. Instructions on future appointments with [facility] were given. The [facility] will call to schedule your sleep study. If you have any questions you can reach them at [phone]. Please call to schedule your next appointment after you schedule your sleep study.

## 2021-08-19 NOTE — PROGRESS NOTES
Chief Complaint/Referring Provider:  Acute visit    Patient has come to the office for an acute visit, patient has been having significant coughing which is not abating, patient states that she does have some sinus congestion along with that patient has cough with greenish respite secretions without any hemoptysis, patient states that he also has some swelling of the eyes, patient also has a having trouble in sleeping and having increased sleep fragmentation, patient was ordered a sleep study on the last visit but patient states that it is in November and when the sleep lab was asked it was noticed that patient never scheduled the sleep test but herself, patient states that her nose is getting stuffed up, patient also states that she was exposed to a cat and patient started having increasing nasal stuffiness after that, patient does have some earaches at times, patient does have some chills at times, patient also has been using the rescue inhaler at least once a day in addition to the Spiriva and Symbicort, patient continues to be a smoker, patient also states that the girl who was living with her was using incense sticks which was causing her to have more symptoms, patient has not lost any weight, patient continues to have increased tiredness and sleepiness in the daytime, patient does not have any other pertinent review of system of concern     PreviousHPI: Patient is a 37-year-old female who was referred to the office for pulmonary evaluation for asthma and COPD    Patient on evaluation states that she feels shaky this morning, patient states that she has COPD/asthma/pulmonary emphysema along with sleep apnea as has been told to her by her previous providers, patient also states that she has oxygen at home which she takes at nighttime, patient on questioning states that she does have coughing along with that patient has respite secretions which is yellowish-green color without any hemoptysis, patient does have some nasal congestion and also has occasional epistaxis, patient states that she also has pleuritic chest pain, patient does not have any significant fever or chills, patient also states that she has had right ear infection which was treated, patient states that she has Symbicort along with that patient has a nebulizer and rescue inhaler which she has been using off-and-on, patient has had mild sinus congestion, patient has been allergic to pets which were there before but they have been giving away, patient has renal sore throat, patient has questionable oropharyngeal dysphagia, patient does have caffeine on regular basis which causes her to have GERD, patient is taking medication for the same, patient also states that she has irritable bowel syndrome, patient also has occasional leg swelling, patient continues to be a smoker patient smokes about half a pack a day, patient has had trial of nicotine patches and Chantix in the past with some improvement, patient does not have any change in the ambient environment any sick contacts, patient also states that she has a nerve damage in the legs and she does not know why but it is being investigated, patient has been given Mobic and tramadol for the same, patient also states that she had a sleep study done last year at [facility] and was told that she has RUTH but no CPAP was given to her, patient's family states that she stops breathing at nighttime to the extent that he is afraid that she will have a respiratory arrest, no other pertinent review of system of concern    Past Medical History:   Diagnosis Date    Arthritis     Asthma     [facility]     Chronic constipation     COPD (chronic obstructive pulmonary disease) ([facility]     CRPS (complex regional pain syndrome type II)     Dental abscess     Essential hypertension 8/12/2021    GERD (gastroesophageal reflux disease)     Hx of blood clots     Low back pain     Mixed hyperlipidemia 2021    Orthostatic hypotension     Osteoporosis     Peptic ulcer disease     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 [facility]     Psychosis ([facility]     PTSD (post-traumatic stress disorder)        Past Surgical History:   Procedure Laterality Date    APPENDECTOMY  2021     LAPAROSCOPIC APPENDECTOMY      SECTION      LAPAROSCOPIC APPENDECTOMY N/A 2021    LAPAROSCOPIC APPENDECTOMY performed by Priscilla Puente MD at [address] [facility] [address]   Allergen Reactions    Clindamycin/Lincomycin Other (See Comments), Hives, Itching and Shortness Of Breath     Weakness, and dizziness  Eye and throat swelling       Penicillins Swelling and Other (See Comments)     Other reaction(s): anaphylaxis/angioedema, anaphylaxis/angioedema  Throat swelling  Eye and throat swelling   Eye and throat swelling      Amoxicillin     Citalopram Hydrobromide      She thinks it made her bp to elevate    Clavulanic Acid Nausea And Vomiting       Medication list was reviewed and updated as needed in Epic    Social History     Socioeconomic History    Marital status:      Spouse name: Not on file    Number of children: Not on file    Years of education: Not on file    Highest education level: Not on file   Occupational History    Not on file   Tobacco Use    Smoking status: Current Every Day Smoker     Packs/day: 0.50     Types: Cigarettes     Start date: 1994    Smokeless tobacco: Never Used   Vaping Use    Vaping Use: Never used   Substance and Sexual Activity    Alcohol use: No    Drug use: Not Currently     Types: Marijuana    Sexual activity: Not Currently   Other Topics Concern    Not on file   Social History Narrative    Not on file     Social Determinants of Health     Financial Resource Strain:     Difficulty of Paying Living Expenses:    Food Insecurity:     Worried About Running Out of Food in the Last Year:     Franki purcell extremities. Psychiatric: Normal mood and affect. Behavior is normal.  No anxiety. Neurologic: Alert, awake and oriented. PERRL. Speech fluent      Sleep Medicine Data:                                  Data:     Imaging:  I have reviewed radiology images personally. No orders to display     ECHO in 2020 from care everywhere-CONCLUSIONS     Normal global left ventricular size and systolic function with no obvious regional wall motion abnormalities.     Normal right ventricular size and function.     Normal left atrial size.     Mild-moderate tricuspid regurgitation.     Normal size inferior vena cava with normal inspiratory response.     Normal size aortic root and proximal ascending aorta. Nuclear stress test- IMPRESSIONS    Homogeneous uptake of isotope throughout the left ventricle on both stress and rest images.     No evidence of myocardial ischemia or prior myocardial infarction.     Scan indicates low risk for cardiac events    Patient had CPET in October 2020  shows patient to have poor effort cardiopulmonary stress test as patient was unable to walk on the treadmill and they were unable to comment on potential cardiac or potential pulmonary limitations spirometry shows no significant obstruction consider repeating the test    Patient's sleep study done in August she states that patient has moderate snoring along with that patient has more mild sleep apnea with RDI of 10.2 and AHI of 1.9 with no Cheyne-Moncada respiration at that time    Assessment:    1. COPD exacerbation ([address]      2. Observed sleep apnea      3. Gastroesophageal reflux disease, unspecified whether esophagitis present      4. Class 2 severe obesity with serious comorbidity and body mass index (BMI) of 36.0 to 36.9 in adult, unspecified obesity type ([address]      5.  Current smoker        Plan:   · Patient's review of system were discussed  · Patient's auscultatory findings were discussed with patient  · Patient has acute bronchospasm at this time  · Patient was given a course of doxycycline 100 mg twice a day for 7 days  · Patient was given prednisone 20 mg once a day to be taken in the morning was 10 days  · Steam inhalation will help  · Patient was told about the pathophysiology of the disease process and its modifying factors  · Patient has been told that she has sleep apnea on the basis of clinical grounds and was told to do a sleep study which has not been done so far and it was reordered  · Smoking needs to be stopped otherwise patient will have increasing morbidity and mortality and patient can be respiratory cripple  · Patient to avoid allergens including cats  · Patient was told about the pathophysiology and sequelae of RUTH  · Patient will take Symbicort inhaler 2 puffs twice a day and to rinse mouth with water after use  · Patient to continue with Spiriva as before  · Patient also takes albuterol inhaler or nebulizer when required  · Patient was shown how to take it properly and was told to take once a day along with Symbicort on regular basis  · Patient to take albuterol inhaler 2 puffs every 6 on whenever necessary basis or nebulizer if need be  · Will reassess in the future about repeating a PFT  · Patient was told about diet and lifestyle modifications  · Patient's medication for PPI as per PCP to continue  · Patient needs to lose weight  · Patient needs to have a regular exercise and if patient cannot do any walking because of her neuropathy and patient may consider some water aerobics  · Smoking cessation is imperative  · Patien needs to be proactive in taking care of herself otherwise she will have increasing morbidity and mortality  · Patient needs to have a comprehensive plan about diet lifestyle modifications medications and follow-ups and smoking cessation  · Patient further treatment depending on patient's clinical status and the test results

## 2021-08-19 NOTE — PROGRESS NOTES
MA Communication: The following orders are received by verbal communication from Janeth Sorto MD    Orders include:    Sleep study: The sleep center will call patient to schedule.   Follow up: pt to call to schedule after her sleep study

## 2021-08-24 ENCOUNTER — TELEPHONE (OUTPATIENT)
Dept: PULMONOLOGY | Age: 46
End: 2021-08-24

## 2021-08-30 ENCOUNTER — OFFICE VISIT (OUTPATIENT)
Dept: FAMILY MEDICINE CLINIC | Age: 46
End: 2021-08-30
Payer: MEDICARE

## 2021-08-30 VITALS
HEART RATE: 82 BPM | OXYGEN SATURATION: 98 % | SYSTOLIC BLOOD PRESSURE: 118 MMHG | WEIGHT: 212.4 LBS | DIASTOLIC BLOOD PRESSURE: 82 MMHG | HEIGHT: 64 IN | BODY MASS INDEX: 36.26 KG/M2

## 2021-08-30 DIAGNOSIS — K21.9 CHRONIC GERD: ICD-10-CM

## 2021-08-30 DIAGNOSIS — F43.10 PTSD (POST-TRAUMATIC STRESS DISORDER): Chronic | ICD-10-CM

## 2021-08-30 DIAGNOSIS — G56.40 COMPLEX REGIONAL PAIN SYNDROME TYPE 2, AFFECTING UNSPECIFIED SITE: ICD-10-CM

## 2021-08-30 DIAGNOSIS — E53.8 B12 DEFICIENCY: ICD-10-CM

## 2021-08-30 DIAGNOSIS — F31.9 BIPOLAR 1 DISORDER (HCC): ICD-10-CM

## 2021-08-30 DIAGNOSIS — K22.70 BARRETT'S ESOPHAGUS WITHOUT DYSPLASIA: ICD-10-CM

## 2021-08-30 DIAGNOSIS — E55.9 VITAMIN D DEFICIENCY: ICD-10-CM

## 2021-08-30 DIAGNOSIS — I10 ESSENTIAL HYPERTENSION: Primary | ICD-10-CM

## 2021-08-30 DIAGNOSIS — E78.2 MIXED HYPERLIPIDEMIA: ICD-10-CM

## 2021-08-30 DIAGNOSIS — G47.33 OBSTRUCTIVE SLEEP APNEA: ICD-10-CM

## 2021-08-30 DIAGNOSIS — M51.36 DDD (DEGENERATIVE DISC DISEASE), LUMBAR: ICD-10-CM

## 2021-08-30 PROCEDURE — 4004F PT TOBACCO SCREEN RCVD TLK: CPT | Performed by: FAMILY MEDICINE

## 2021-08-30 PROCEDURE — G8417 CALC BMI ABV UP PARAM F/U: HCPCS | Performed by: FAMILY MEDICINE

## 2021-08-30 PROCEDURE — G8427 DOCREV CUR MEDS BY ELIG CLIN: HCPCS | Performed by: FAMILY MEDICINE

## 2021-08-30 PROCEDURE — 99204 OFFICE O/P NEW MOD 45 MIN: CPT | Performed by: FAMILY MEDICINE

## 2021-08-30 PROCEDURE — 36415 COLL VENOUS BLD VENIPUNCTURE: CPT | Performed by: FAMILY MEDICINE

## 2021-08-30 ASSESSMENT — ENCOUNTER SYMPTOMS
BACK PAIN: 1
ABDOMINAL PAIN: 1
SHORTNESS OF BREATH: 1

## 2021-08-30 ASSESSMENT — PATIENT HEALTH QUESTIONNAIRE - PHQ9
SUM OF ALL RESPONSES TO PHQ QUESTIONS 1-9: 0
2. FEELING DOWN, DEPRESSED OR HOPELESS: 0
SUM OF ALL RESPONSES TO PHQ9 QUESTIONS 1 & 2: 0
1. LITTLE INTEREST OR PLEASURE IN DOING THINGS: 0
SUM OF ALL RESPONSES TO PHQ QUESTIONS 1-9: 0
SUM OF ALL RESPONSES TO PHQ QUESTIONS 1-9: 0

## 2021-08-30 NOTE — PROGRESS NOTES
Chief Complaint   Patient presents with    Establish Care       HPI:  iDya Lozano is a 42 y.o. (: 1975) here today to establish care. HPI  Had been seeing NP at [facility] in [facility]. Here to establish care. She is only in 2 days per week. Worried that needs someone who is in more often. Hx of copd and jared. Using oxygen at night. Using 2LPM at night. Has been on symbicort for some time. Seems to help breathing overall. Seen by pulm approx 2 weeks ago  Has spiriva as well. Using albuterol, more as nebulizer, several times per day. Did have steroids approx 2 weeks ago. Sig gerd. Taking prilosec 40mg bid. Seems to help. Hx of ball's. Due for rpt egd next yr    Currently being treated at [facility]. Being treated for ptsd, bipolar, depression and anxiety. Hx of tarditive dyskinesia. Has been on current meds for mood and nerves. Has been taking gabapentin more recently. lexapro for few yrs, helping. invega helping. seroquel at bedtime. Sees counselor and psych. Hx of palpitations and leaky valve. Has been seen by cardio at [address]. Has venous and art dopplers approx 10 d ago. No blockage noted. Ddd. Sees pain mgmt for back. They added gabapentin. Getting percocet from them as well. Patient's medications, allergies, past medical, surgical, social and family histories were reviewed and updated as appropriate. ROS:  Review of Systems   Constitutional: Negative for fever. Respiratory: Positive for shortness of breath. Gastrointestinal: Positive for abdominal pain. Musculoskeletal: Positive for back pain.            Microscopic Examination (no units)   Date Value   2019 YES       Past Medical History:   Diagnosis Date    Arthritis     Asthma     [facility] [address] [facility]     Chronic constipation     COPD (chronic obstructive pulmonary disease) (HCC)     CRPS (complex regional pain syndrome type II)     Dental abscess     Essential hypertension 08/12/2021    GERD (gastroesophageal reflux disease)     Hx of blood clots     hx of pos d dimer    Low back pain     Mixed hyperlipidemia 08/12/2021    Orthostatic hypotension     Osteoporosis     Peptic ulcer disease     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 ([facility]     Psychosis ([facility]     PTSD (post-traumatic stress disorder)        Family History   Problem Relation Age of Onset    Cancer Mother     Cancer Father     Cancer Brother     Heart Disease Brother        Social History     Socioeconomic History    Marital status:      Spouse name: Not on file    Number of children: Not on file    Years of education: Not on file    Highest education level: Not on file   Occupational History    Not on file   Tobacco Use    Smoking status: Current Every Day Smoker     Packs/day: 0.50     Types: Cigarettes     Start date: 1/28/1994    Smokeless tobacco: Never Used   Vaping Use    Vaping Use: Never used   Substance and Sexual Activity    Alcohol use: No    Drug use: Not Currently     Types: Marijuana    Sexual activity: Not Currently   Other Topics Concern    Not on file   Social History Narrative    Not on file     Social Determinants of Health     Financial Resource Strain:     Difficulty of Paying Living Expenses:    Food Insecurity:     Worried About Running Out of Food in the Last Year:     Ran Out of Food in the Last Year:    Transportation Needs:     Lack of Transportation (Medical):      Lack of Transportation (Non-Medical):    Physical Activity:     Days of Exercise per Week:     Minutes of Exercise per Session:    Stress:     Feeling of Stress :    Social Connections:     Frequency of Communication with Friends and Family:     Frequency of Social Gatherings with Friends and Family:     Attends Jainism Services:     Active Member of Clubs or Organizations:     Attends Club or Organization Meetings:    Vanesa Marital Status:    Intimate Partner Violence:     Fear of Current or Ex-Partner:     Emotionally Abused:     Physically Abused:     Sexually Abused:        Prior to Visit Medications    Medication Sig Taking? Authorizing Provider   oxyCODONE-acetaminophen (PERCOCET) 5-325 MG per tablet Take 1 tablet by mouth every 8 hours as needed. Yes Historical Provider, MD   albuterol (PROVENTIL) (2.5 MG/3ML) 0.083% nebulizer solution USE 1 VIAL IN NEBULIZER EVERY 4 HOURS AS NEEDED FOR WHEEZING Yes Historical Provider, MD   SPIRIVA RESPIMAT 2.5 MCG/ACT AERS inhaler INHALE 2 PUFFS INTO THE LUNGS DAILY Yes Drew Carvajal MD   aspirin 81 MG EC tablet Take by mouth daily Yes Historical Provider, MD   atorvastatin (LIPITOR) 10 MG tablet Take 10 mg by mouth daily Yes Historical Provider, MD   budesonide-formoterol (SYMBICORT) 160-4.5 MCG/ACT AERO Inhale 2 puffs into the lungs 2 times daily Yes Historical Provider, MD   carvedilol (COREG) 6.25 MG tablet Take 6.25 mg by mouth 2 times daily Yes Historical Provider, MD   docusate (COLACE, DULCOLAX) 100 MG CAPS Take 100 mg by mouth 2 times daily as needed  Yes Historical Provider, MD   furosemide (LASIX) 40 MG tablet Take 40 mg by mouth daily Yes Historical Provider, MD   gabapentin (NEURONTIN) 300 MG capsule Take 600 mg by mouth 3 times daily.   Yes Historical Provider, MD   HYDROXYZINE HCL PO Take 50 mg by mouth 2 times daily  Yes Historical Provider, MD   levocetirizine (XYZAL) 5 MG tablet Take 5 mg by mouth nightly Yes Historical Provider, MD   lamoTRIgine (LAMICTAL) 25 MG tablet Take 50 mg by mouth daily  Yes Historical Provider, MD   QUEtiapine (SEROQUEL) 25 MG tablet Take 25 mg by mouth daily as needed  Yes Historical Provider, MD   OXYGEN Inhale into the lungs Yes Historical Provider, MD   paliperidone (INVEGA) 9 MG extended release tablet Take 9 mg by mouth every morning Yes Historical Provider, MD   ondansetron (ZOFRAN) 4 MG tablet Take 4 mg by mouth every 8 hours as needed for Nausea or Vomiting Yes Historical Provider, MD   fluticasone (FLONASE) 50 MCG/ACT nasal spray 1 spray by Each Nostril route daily Yes Emili Miller DO   cyclobenzaprine (FLEXERIL) 10 MG tablet Take 10 mg by mouth 2 times daily as needed for Muscle spasms  Yes Historical Provider, MD   escitalopram (LEXAPRO) 20 MG tablet Take 20 mg by mouth daily Yes Historical Provider, MD   albuterol sulfate HFA (VENTOLIN HFA) 108 (90 Base) MCG/ACT inhaler Inhale 2 puffs into the lungs every 6 hours as needed for Shortness of Breath Yes Brendan Valles MD   omeprazole (PRILOSEC) 40 MG delayed release capsule 2 times daily  Yes Historical Provider, MD       Allergies   Allergen Reactions    Clindamycin/Lincomycin Other (See Comments), Hives, Itching and Shortness Of Breath     Weakness, and dizziness  Eye and throat swelling       Penicillins Swelling and Other (See Comments)     Other reaction(s): anaphylaxis/angioedema, anaphylaxis/angioedema  Throat swelling  Eye and throat swelling   Eye and throat swelling      Amoxicillin     Citalopram Hydrobromide      She thinks it made her bp to elevate    Clavulanic Acid Nausea And Vomiting       OBJECTIVE:    /82   Pulse 82   Ht 5' 4\" (1.626 m)   Wt 212 lb 6.4 oz (96.3 kg)   LMP 12/18/2013   SpO2 98%   BMI 36.46 kg/m²     BP Readings from Last 2 Encounters:   08/30/21 118/82   08/19/21 98/70       Wt Readings from Last 3 Encounters:   08/30/21 212 lb 6.4 oz (96.3 kg)   08/19/21 210 lb 9.6 oz (95.5 kg)   08/12/21 209 lb (94.8 kg)       Physical Exam  Constitutional:       Appearance: She is well-developed. HENT:      Head: Normocephalic and atraumatic. Right Ear: Hearing normal.      Left Ear: Hearing normal.   Eyes:      Conjunctiva/sclera: Conjunctivae normal.   Neck:      Trachea: No tracheal deviation. Cardiovascular:      Rate and Rhythm: Normal rate and regular rhythm.    Pulmonary:      Effort: Pulmonary effort is normal.      Breath sounds: Normal breath sounds. Abdominal:      Palpations: Abdomen is soft. Tenderness: There is abdominal tenderness in the epigastric area. Skin:     General: Skin is warm and dry. Neurological:      Mental Status: She is alert and oriented to person, place, and time. Psychiatric:         Mood and Affect: Mood normal.         Behavior: Behavior normal.           ASSESSMENT/PLAN:    1. Essential hypertension  The current medical regimen is effective;  continue present plan and medications. Follow-up with cardiology as scheduled. Sounds as if her Coreg is doing a good job controlling her blood pressure and palpitations. - Comprehensive Metabolic Panel    2. Mixed hyperlipidemia  Order for repeat labs given  - Lipid Panel    3. Obstructive sleep apnea  Follow-up with pulmonology as scheduled. Sleep study is being planned    4. Chronic GERD  Continue proton pump inhibitor. Follow-up with GI as scheduled. Possible hiatal hernia based on history. I did inform the patient that hiatal hernia repair is a more complex procedure. I was concerned with scheduling her for that at this time. Sounds as if the epigastric discomfort is not new. Recommend GI follow-up and esophagogastroduodenoscopy as planned. 5. Moran's esophagus without dysplasia  See above    6. PTSD (post-traumatic stress disorder)  Continue current medications. Follow-up with psych as scheduled    7. Bipolar 1 disorder ([address]  Follow-up with psych as scheduled    8. Complex regional pain syndrome type 2, affecting unspecified site  Follow-up with pain management as scheduled    9. Vitamin D deficiency  Repeat labs today. If still low, supplementation may help with some of her other symptoms  - Vitamin D 25 Hydroxy    10. B12 deficiency  Repeat labs today  - Vitamin B12  - CBC Auto Differential    11.  DDD (degenerative disc disease), lumbar  Follow-up with pain management as scheduled    This document was prepared by a combination of typing and transcription through a voice recognition software.

## 2021-08-30 NOTE — PATIENT INSTRUCTIONS
Patient Education        Stopping Smoking: Care Instructions  Your Care Instructions     Cigarette smokers crave the nicotine in cigarettes. Giving it up is much harder than simply changing a habit. Your body has to stop craving the nicotine. It is hard to quit, but you can do it. There are many tools that people use to quit smoking. You may find that combining tools works best for you. There are several steps to quitting. First you get ready to quit. Then you get support to help you. After that, you learn new skills and behaviors to become a nonsmoker. For many people, a necessary step is getting and using medicine. Your doctor will help you set up the plan that best meets your needs. You may want to attend a smoking cessation program to help you quit smoking. When you choose a program, look for one that has proven success. Ask your doctor for ideas. You will greatly increase your chances of success if you take medicine as well as get counseling or join a cessation program.  Some of the changes you feel when you first quit tobacco are uncomfortable. Your body will miss the nicotine at first, and you may feel short-tempered and grumpy. You may have trouble sleeping or concentrating. Medicine can help you deal with these symptoms. You may struggle with changing your smoking habits and rituals. The last step is the tricky one: Be prepared for the smoking urge to continue for a time. This is a lot to deal with, but keep at it. You will feel better. Follow-up care is a key part of your treatment and safety. Be sure to make and go to all appointments, and call your doctor if you are having problems. It's also a good idea to know your test results and keep a list of the medicines you take. How can you care for yourself at home? · Ask your family, friends, and coworkers for support. You have a better chance of quitting if you have help and support.   · Join a support group, such as Nicotine Anonymous, for people who are trying to quit smoking. · Consider signing up for a smoking cessation program, such as the American Lung Association's Freedom from Smoking program.  · Get text messaging support. Go to the website at www.smokefree. gov to sign up for the [facility] program.  · Set a quit date. Pick your date carefully so that it is not right in the middle of a big deadline or stressful time. Once you quit, do not even take a puff. Get rid of all ashtrays and lighters after your last cigarette. Clean your house and your clothes so that they do not smell of smoke. · Learn how to be a nonsmoker. Think about ways you can avoid those things that make you reach for a cigarette. ? Avoid situations that put you at greatest risk for smoking. For some people, it is hard to have a drink with friends without smoking. For others, they might skip a coffee break with coworkers who smoke. ? Change your daily routine. Take a different route to work or eat a meal in a different place. · Cut down on stress. Calm yourself or release tension by doing an activity you enjoy, such as reading a book, taking a hot bath, or gardening. · Talk to your doctor or pharmacist about nicotine replacement therapy, which replaces the nicotine in your body. You still get nicotine but you do not use tobacco. Nicotine replacement products help you slowly reduce the amount of nicotine you need. These products come in several forms, many of them available over-the-counter:  ? Nicotine patches  ? Nicotine gum and lozenges  ? Nicotine inhaler  · Ask your doctor about bupropion (Wellbutrin) or varenicline (Chantix), which are prescription medicines. They do not contain nicotine. They help you by reducing withdrawal symptoms, such as stress and anxiety. · Some people find hypnosis, acupuncture, and massage helpful for ending the smoking habit. · Eat a healthy diet and get regular exercise.  Having healthy habits will help your body move past its craving for nicotine. · Be prepared to keep trying. Most people are not successful the first few times they try to quit. Do not get mad at yourself if you smoke again. Make a list of things you learned and think about when you want to try again, such as next week, next month, or next year. Where can you learn more? Go to https://[employer]. org and sign in to your [employer] account. Enter H682 in the [employer] box to learn more about \"Stopping Smoking: Care Instructions. \"     If you do not have an account, please click on the \"Sign Up Now\" link. Current as of: February 11, 2021               Content Version: 12.9  © 2006-2021 Healthwise, Incorporated. Care instructions adapted under license by [facility] [place] ([facility]). If you have questions about a medical condition or this instruction, always ask your healthcare professional. [address] any warranty or liability for your use of this information.

## 2021-08-31 LAB
A/G RATIO: 1.9 (ref 1.1–2.2)
ALBUMIN SERPL-MCNC: 4.3 G/DL (ref 3.4–5)
ALP BLD-CCNC: 99 U/L (ref 40–129)
ALT SERPL-CCNC: 19 U/L (ref 10–40)
ANION GAP SERPL CALCULATED.3IONS-SCNC: 13 MMOL/L (ref 3–16)
AST SERPL-CCNC: 15 U/L (ref 15–37)
BASOPHILS ABSOLUTE: 0.2 K/UL (ref 0–0.2)
BASOPHILS RELATIVE PERCENT: 1.2 %
BILIRUB SERPL-MCNC: <0.2 MG/DL (ref 0–1)
BUN BLDV-MCNC: 14 MG/DL (ref 7–20)
CALCIUM SERPL-MCNC: 9.7 MG/DL (ref 8.3–10.6)
CHLORIDE BLD-SCNC: 100 MMOL/L (ref 99–110)
CHOLESTEROL, TOTAL: 180 MG/DL (ref 0–199)
CO2: 27 MMOL/L (ref 21–32)
CREAT SERPL-MCNC: 0.8 MG/DL (ref 0.6–1.1)
EOSINOPHILS ABSOLUTE: 0.7 K/UL (ref 0–0.6)
EOSINOPHILS RELATIVE PERCENT: 4.1 %
GFR AFRICAN AMERICAN: >60
GFR NON-AFRICAN AMERICAN: >60
GLOBULIN: 2.3 G/DL
GLUCOSE BLD-MCNC: 85 MG/DL (ref 70–99)
HCT VFR BLD CALC: 43.4 % (ref 36–48)
HDLC SERPL-MCNC: 42 MG/DL (ref 40–60)
HEMOGLOBIN: 14.3 G/DL (ref 12–16)
LDL CHOLESTEROL CALCULATED: 84 MG/DL
LYMPHOCYTES ABSOLUTE: 6.4 K/UL (ref 1–5.1)
LYMPHOCYTES RELATIVE PERCENT: 39.9 %
MCH RBC QN AUTO: 28.9 PG (ref 26–34)
MCHC RBC AUTO-ENTMCNC: 32.9 G/DL (ref 31–36)
MCV RBC AUTO: 87.9 FL (ref 80–100)
MONOCYTES ABSOLUTE: 0.8 K/UL (ref 0–1.3)
MONOCYTES RELATIVE PERCENT: 5.1 %
NEUTROPHILS ABSOLUTE: 7.9 K/UL (ref 1.7–7.7)
NEUTROPHILS RELATIVE PERCENT: 49.7 %
PDW BLD-RTO: 15.5 % (ref 12.4–15.4)
PLATELET # BLD: 288 K/UL (ref 135–450)
PMV BLD AUTO: 9.5 FL (ref 5–10.5)
POTASSIUM SERPL-SCNC: 4.5 MMOL/L (ref 3.5–5.1)
RBC # BLD: 4.95 M/UL (ref 4–5.2)
SODIUM BLD-SCNC: 140 MMOL/L (ref 136–145)
TOTAL PROTEIN: 6.6 G/DL (ref 6.4–8.2)
TRIGL SERPL-MCNC: 268 MG/DL (ref 0–150)
VITAMIN B-12: 369 PG/ML (ref 211–911)
VITAMIN D 25-HYDROXY: 44.9 NG/ML
VLDLC SERPL CALC-MCNC: 54 MG/DL
WBC # BLD: 15.9 K/UL (ref 4–11)

## 2021-08-31 NOTE — RESULT ENCOUNTER NOTE
B12 borderline low. Vit d normal.  Mild inc in trig. O/w lipids ok. Cmp ok. Wbc elevated. Has been elevated in a similar range in the past.  Uncertain etiology.   Given persistent elevation, consider heme referral.

## 2021-09-01 DIAGNOSIS — D72.829 LEUKOCYTOSIS, UNSPECIFIED TYPE: Primary | ICD-10-CM

## 2021-09-11 PROBLEM — Z01.818 PREOPERATIVE CLEARANCE: Status: RESOLVED | Noted: 2021-08-12 | Resolved: 2021-09-11

## 2021-09-13 ENCOUNTER — TELEPHONE (OUTPATIENT)
Dept: FAMILY MEDICINE CLINIC | Age: 46
End: 2021-09-13

## 2021-09-13 DIAGNOSIS — Z11.52 ENCOUNTER FOR SCREENING FOR COVID-19: Primary | ICD-10-CM

## 2021-09-13 LAB
INTERNAL QC: NORMAL
SARS-COV-2, NAA: NEGATIVE

## 2021-09-14 ENCOUNTER — HOSPITAL ENCOUNTER (OUTPATIENT)
Dept: SLEEP CENTER | Age: 46
Discharge: HOME | End: 2021-09-16
Payer: MEDICARE

## 2021-09-14 DIAGNOSIS — G47.30 OBSERVED SLEEP APNEA: ICD-10-CM

## 2021-09-14 PROCEDURE — 95811 POLYSOM 6/>YRS CPAP 4/> PARM: CPT

## 2021-09-15 PROCEDURE — 95811 POLYSOM 6/>YRS CPAP 4/> PARM: CPT | Performed by: INTERNAL MEDICINE

## 2021-09-16 DIAGNOSIS — J44.1 COPD EXACERBATION (HCC): ICD-10-CM

## 2021-09-16 RX ORDER — LEVOCETIRIZINE DIHYDROCHLORIDE 5 MG/1
5 TABLET, FILM COATED ORAL NIGHTLY
Qty: 30 TABLET | Refills: 5 | Status: SHIPPED | OUTPATIENT
Start: 2021-09-16 | End: 2021-09-23 | Stop reason: SDUPTHER

## 2021-09-23 ENCOUNTER — TELEPHONE (OUTPATIENT)
Dept: PULMONOLOGY | Age: 46
End: 2021-09-23

## 2021-09-23 RX ORDER — CARVEDILOL 6.25 MG/1
6.25 TABLET ORAL 2 TIMES DAILY
Qty: 180 TABLET | Refills: 3 | Status: SHIPPED | OUTPATIENT
Start: 2021-09-23 | End: 2022-09-07 | Stop reason: SDUPTHER

## 2021-09-23 RX ORDER — OMEPRAZOLE 40 MG/1
40 CAPSULE, DELAYED RELEASE ORAL 2 TIMES DAILY
Qty: 180 CAPSULE | Refills: 3 | Status: SHIPPED | OUTPATIENT
Start: 2021-09-23 | End: 2022-09-07 | Stop reason: SDUPTHER

## 2021-09-23 RX ORDER — LEVOCETIRIZINE DIHYDROCHLORIDE 5 MG/1
5 TABLET, FILM COATED ORAL NIGHTLY
Qty: 90 TABLET | Refills: 3 | Status: SHIPPED | OUTPATIENT
Start: 2021-09-23 | End: 2022-09-07 | Stop reason: SDUPTHER

## 2021-09-23 RX ORDER — ATORVASTATIN CALCIUM 10 MG/1
10 TABLET, FILM COATED ORAL DAILY
Qty: 90 TABLET | Refills: 3 | Status: SHIPPED | OUTPATIENT
Start: 2021-09-23 | End: 2022-09-07 | Stop reason: SDUPTHER

## 2021-09-23 RX ORDER — ASPIRIN 81 MG/1
81 TABLET ORAL DAILY
Qty: 90 TABLET | Refills: 3 | Status: SHIPPED | OUTPATIENT
Start: 2021-09-23 | End: 2022-09-07 | Stop reason: SDUPTHER

## 2021-09-23 NOTE — TELEPHONE ENCOUNTER
Within this Telehealth Consent, the terms you and yours refer to the person using the Telehealth Service (Service), or in the case of a use of the Service by or on behalf of a minor, you and yours refer to and include (i) the parent or legal guardian who provides consent to the use of the Service by such minor or uses the Service on behalf of such minor, and (ii) the minor for whom consent is being provided or on whose behalf the Service is being utilized. When using Service, you will be consulting with your health care providers via the use of Telehealth.   Telehealth involves the delivery of healthcare services using electronic communications, information technology or other means between a healthcare provider and a patient who are not in the same physical location. Telehealth may be used for diagnosis, treatment, follow-up and/or patient education, and may include, but is not limited to, one or more of the following:    Electronic transmission of medical records, photo images, personal health information or other data between a patient and a healthcare provider    Interactions between a patient and healthcare provider via audio, video and/or data communications    Use of output data from medical devices, sound and video files    Anticipated Benefits   The use of Telehealth by your Provider(s) through the Service may have the following possible benefits:    Making it easier and more efficient for you to access medical care and treatment for the conditions treated by such Provider(s) utilizing the Service    Allowing you to obtain medical care and treatment by Provider(s) at times that are convenient for you    Enabling you to interact with Provider(s) without the necessity of an in-office appointment     Possible Risks   While the use of Telehealth can provide potential benefits for you, there are also potential risks associated with the use of Telehealth.  These risks include, but may not be the provision of medical care and treatment via Telehealth and the Service and you may not be able to receive diagnosis and/or treatment through the Service for every condition for which you seek diagnosis and/or treatment. 11. There are potential risks to the use of Telehealth, including but not limited to the risks described in this Telehealth Consent. 12. Your Provider(s) have discussed the use of Telehealth and the Service with you, including the benefits and risks of such and you have provided oral consent to your Provider(s) for the use of Telehealth and the Service. 15. You understand that it is your duty to provide your Provider(s) truthful, accurate and complete information, including all relevant information regarding care that you may have received or may be receiving from other healthcare providers outside of the Service. 14. You understand that each of your Provider(s) may determine in his or sole discretion that your condition is not suitable for diagnosis and/or treatment using the Service, and that you may need to seek medical care and treatment a specialist or other healthcare provider, outside of the Service. 15. You understand that you are fully responsible for payment for all services provided by Provider(s) or through use of the Service and that you may not be able to use third-party insurance. 16. You represent that (a) you have read this Telehealth Consent carefully, (b) you understand the risks and benefits of the Service and the use of Telehealth in the medical care and treatment provided to you by Provider(s) using the Service, and (c) you have the legal capacity and authority to provide this consent for yourself and/or the minor for which you are consenting under applicable federal and state laws, including laws relating to the age of [de-identified] and/or parental/guardian consent.    17. You give your informed consent to the use of Telehealth by Provider(s) using the Service under the terms described in the Terms of Service and this Telehealth Consent. The patient was read the following statement and has consented to the visit as of 9/23/21.      The patient has been scheduled for their first telehealth visit on 10/6/21 with Dwayne Fagan

## 2021-09-29 ENCOUNTER — PATIENT MESSAGE (OUTPATIENT)
Dept: PULMONOLOGY | Age: 46
End: 2021-09-29

## 2021-10-06 ENCOUNTER — VIRTUAL VISIT (OUTPATIENT)
Dept: PULMONOLOGY | Age: 46
End: 2021-10-06
Payer: MEDICARE

## 2021-10-06 DIAGNOSIS — G47.33 OSA (OBSTRUCTIVE SLEEP APNEA): ICD-10-CM

## 2021-10-06 DIAGNOSIS — E66.01 SEVERE OBESITY (BMI 35.0-35.9 WITH COMORBIDITY) (HCC): ICD-10-CM

## 2021-10-06 DIAGNOSIS — R09.89 CHEST CONGESTION: ICD-10-CM

## 2021-10-06 DIAGNOSIS — J31.0 CHRONIC RHINITIS: ICD-10-CM

## 2021-10-06 DIAGNOSIS — J44.1 COPD EXACERBATION (HCC): Primary | ICD-10-CM

## 2021-10-06 DIAGNOSIS — F17.200 CURRENT SMOKER: ICD-10-CM

## 2021-10-06 DIAGNOSIS — J41.0 SIMPLE CHRONIC BRONCHITIS (HCC): ICD-10-CM

## 2021-10-06 PROCEDURE — 3023F SPIROM DOC REV: CPT | Performed by: INTERNAL MEDICINE

## 2021-10-06 PROCEDURE — G8926 SPIRO NO PERF OR DOC: HCPCS | Performed by: INTERNAL MEDICINE

## 2021-10-06 PROCEDURE — G8427 DOCREV CUR MEDS BY ELIG CLIN: HCPCS | Performed by: INTERNAL MEDICINE

## 2021-10-06 PROCEDURE — G8484 FLU IMMUNIZE NO ADMIN: HCPCS | Performed by: INTERNAL MEDICINE

## 2021-10-06 PROCEDURE — 99214 OFFICE O/P EST MOD 30 MIN: CPT | Performed by: INTERNAL MEDICINE

## 2021-10-06 PROCEDURE — 4004F PT TOBACCO SCREEN RCVD TLK: CPT | Performed by: INTERNAL MEDICINE

## 2021-10-06 PROCEDURE — G8417 CALC BMI ABV UP PARAM F/U: HCPCS | Performed by: INTERNAL MEDICINE

## 2021-10-06 RX ORDER — DOXYCYCLINE HYCLATE 100 MG
100 TABLET ORAL 2 TIMES DAILY
Qty: 14 TABLET | Refills: 0 | Status: SHIPPED | OUTPATIENT
Start: 2021-10-06 | End: 2021-10-13

## 2021-10-06 RX ORDER — METHYLPREDNISOLONE 4 MG/1
TABLET ORAL
Qty: 1 KIT | Refills: 0 | Status: SHIPPED | OUTPATIENT
Start: 2021-10-06 | End: 2021-10-12

## 2021-10-06 NOTE — PROGRESS NOTES
Chief Complaint/Referring Provider: Pulmonary evaluation and to discuss the test results    Virtual visit was done for the patient to discuss the clinical status and the test results, patient states that she has been having some increasing cough with chest congestion along with that patient has been bringing up some secretions which are greenish in color, patient also has been having some nasal congestion and sinus congestion along with that patient has had some epistaxis but no hemoptysis, patient also has been having significant wheezing, patient also has intermittent chills, patient also has had some abdominal discomfort nausea vomiting, patient feels tired and restless in spite of sleeping well and for long time, patient does not have any significant hematochezia or melena, no increasing leg edema, patient continues to be a smoker, patient does not have any change in the ambient environment, patient has taken her vaccinations against COVID-19, patient does not have any confusion lethargy, patient does not have any other pertinent review of systems of concern    PreviousHPI:Patient has come to the office for an acute visit, patient has been having significant coughing which is not abating, patient states that she does have some sinus congestion along with that patient has cough with greenish respite secretions without any hemoptysis, patient states that he also has some swelling of the eyes, patient also has a having trouble in sleeping and having increased sleep fragmentation, patient was ordered a sleep study on the last visit but patient states that it is in November and when the sleep lab was asked it was noticed that patient never scheduled the sleep test but herself, patient states that her nose is getting stuffed up, patient also states that she was exposed to a cat and patient started having increasing nasal stuffiness after that, patient does have some earaches at times, patient does have some chills at times, patient also has been using the rescue inhaler at least once a day in addition to the Spiriva and Symbicort, patient continues to be a smoker, patient also states that the girl who was living with her was using incense sticks which was causing her to have more symptoms, patient has not lost any weight, patient continues to have increased tiredness and sleepiness in the daytime, patient does not have any other pertinent review of system of concern      Patient is a 44-year-old female who was referred to the office for pulmonary evaluation for asthma and COPD    Patient on evaluation states that she feels shaky this morning, patient states that she has COPD/asthma/pulmonary emphysema along with sleep apnea as has been told to her by her previous providers, patient also states that she has oxygen at home which she takes at nighttime, patient on questioning states that she does have coughing along with that patient has respite secretions which is yellowish-green color without any hemoptysis, patient does have some nasal congestion and also has occasional epistaxis, patient states that she also has pleuritic chest pain, patient does not have any significant fever or chills, patient also states that she has had right ear infection which was treated, patient states that she has Symbicort along with that patient has a nebulizer and rescue inhaler which she has been using off-and-on, patient has had mild sinus congestion, patient has been allergic to pets which were there before but they have been giving away, patient has renal sore throat, patient has questionable oropharyngeal dysphagia, patient does have caffeine on regular basis which causes her to have GERD, patient is taking medication for the same, patient also states that she has irritable bowel syndrome, patient also has occasional leg swelling, patient continues to be a smoker patient smokes about half a pack a day, patient has had trial of nicotine patches and Chantix in the past with some improvement, patient does not have any change in the ambient environment any sick contacts, patient also states that she has a nerve damage in the legs and she does not know why but it is being investigated, patient has been given Mobic and tramadol for the same, patient also states that she had a sleep study done last year at [facility] and was told that she has RUTH but no CPAP was given to her, patient's family states that she stops breathing at nighttime to the extent that he is afraid that she will have a respiratory arrest, no other pertinent review of system of concern    Past Medical History:   Diagnosis Date    Arthritis     Asthma     Bipolar 1 disorder ([address]     Chronic constipation     COPD (chronic obstructive pulmonary disease) ([address]     CRPS (complex regional pain syndrome type II)     Dental abscess     Essential hypertension 2021    GERD (gastroesophageal reflux disease)     Hx of blood clots     hx of pos d dimer    Low back pain     Mixed hyperlipidemia 2021    Orthostatic hypotension     Osteoporosis     Peptic ulcer disease     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 ([address]     Psychosis ([address]     PTSD (post-traumatic stress disorder)        Past Surgical History:   Procedure Laterality Date    ANKLE SURGERY Left     APPENDECTOMY  2021     LAPAROSCOPIC APPENDECTOMY      SECTION      HYSTERECTOMY, TOTAL ABDOMINAL      LAPAROSCOPIC APPENDECTOMY N/A 2021    LAPAROSCOPIC APPENDECTOMY performed by Nettie Chaudhari MD at [facility] OR       Allergies   Allergen Reactions    Clindamycin/Lincomycin Other (See Comments), Hives, Itching and Shortness Of Breath     Weakness, and dizziness  Eye and throat swelling       Penicillins Swelling and Other (See Comments)     Other reaction(s): anaphylaxis/angioedema, anaphylaxis/angioedema  Throat swelling  Eye and throat swelling   Eye and throat swelling      Amoxicillin     Citalopram Hydrobromide      She thinks it made her bp to elevate    Clavulanic Acid Nausea And Vomiting       Medication list was reviewed and updated as needed in Epic    Social History     Socioeconomic History    Marital status:      Spouse name: Not on file    Number of children: Not on file    Years of education: Not on file    Highest education level: Not on file   Occupational History    Not on file   Tobacco Use    Smoking status: Current Every Day Smoker     Packs/day: 0.50     Types: Cigarettes     Start date: 1/28/1994    Smokeless tobacco: Never Used   Vaping Use    Vaping Use: Never used   Substance and Sexual Activity    Alcohol use: No    Drug use: Not Currently     Types: Marijuana    Sexual activity: Not Currently   Other Topics Concern    Not on file   Social History Narrative    Not on file     Social Determinants of Health     Financial Resource Strain:     Difficulty of Paying Living Expenses:    Food Insecurity:     Worried About Running Out of Food in the Last Year:     Ran Out of Food in the Last Year:    Transportation Needs:     Lack of Transportation (Medical):      Lack of Transportation (Non-Medical):    Physical Activity:     Days of Exercise per Week:     Minutes of Exercise per Session:    Stress:     Feeling of Stress :    Social Connections:     Frequency of Communication with Friends and Family:     Frequency of Social Gatherings with Friends and Family:     Attends Islam Services:     Active Member of Clubs or Organizations:     Attends Club or Organization Meetings:     Marital Status:    Intimate Partner Violence:     Fear of Current or Ex-Partner:     Emotionally Abused:     Physically Abused:     Sexually Abused:        Family History   Problem Relation Age of Onset    Cancer Mother     Cancer Father     Cancer Brother     Heart Disease Brother             Review of Systems same as above    Physical Exam:  Last menstrual period 12/18/2013.'  Constitutional:  No acute distress. But has had increased chest congestion which was audible on the virtual visit  HENT:  Oropharynx is clear and moist. No thyromegaly. Eyes:  Conjunctivae arenormal. Pupils equal, round, and reactive to light. No scleral icterus. Neck: . No tracheal deviation present. No obvious thyroid mass. Short enlarged neck  Cardiovascular:Normal rate, regular rhythm, normal heart sounds. No right ventricular heave. Minimal lower extremity edema. Pulmonary/Chest: Bilateral wheezing  No rales. Chest wall is not dull to percussion. Noaccessory muscle usage or stridor. Prolonged expiration with decreased breath sound history  Abdominal: Soft. Bowel sounds present. No distension or hernia. Notenderness. Musculoskeletal: No cyanosis. No clubbing. No obvious joint deformity. Lymphadenopathy: No cervical or supraclavicular adenopathy. Skin: Skin is warm and dry. No rash or nodules on the exposed extremities. Psychiatric: Normal mood and affect. Behavior is normal.  No anxiety. Neurologic: Alert, awake and oriented. PERRL. Speech fluent  (deferred)      Data:     Imaging:  I have reviewed radiology images personally. No orders to display     ECHO in 2020 from care everywhere-CONCLUSIONS     Normal global left ventricular size and systolic function with no obvious regional wall motion abnormalities.     Normal right ventricular size and function.     Normal left atrial size.     Mild-moderate tricuspid regurgitation.     Normal size inferior vena cava with normal inspiratory response.     Normal size aortic root and proximal ascending aorta.      Nuclear stress test- IMPRESSIONS    Homogeneous uptake of isotope throughout the left ventricle on both stress and rest images.     No evidence of myocardial ischemia or prior myocardial infarction.     Scan indicates low risk for cardiac events    Patient had CPET in October 2020  shows patient to have poor effort cardiopulmonary stress test as patient was unable to walk on the treadmill and they were unable to comment on potential cardiac or potential pulmonary limitations spirometry shows no significant obstruction consider repeating the test    Patient had a repeat [study which showed patient to have severe RUTH with AHI of 63.7/h and patient's sleep apnea improvement happens with CPAP of 10    Assessment:    1. COPD exacerbation ([address]      2. Obstructive sleep apnea      3. Gastroesophageal reflux disease, unspecified whether esophagitis present      4. Class 2 severe obesity with serious comorbidity and body mass index (BMI) of 36.0 to 36.9 in adult, unspecified obesity type ([address]      5. Current smoker    6. Chest congestion    7.   Sinus congestion        Plan:   · Patient's review of system were discussed on the virtual visit  · Patient appears to have some exacerbation of COPD with chest congestion causing her to be symptomatic  · Patient also has increased nasal congestion sinus congestion  · Patient was given a course of doxycycline 100 mg twice a day for 7 days  · Patient was given prednisone 20 mg once a day to be taken in the morning was 10 days  · Steam inhalation will help  · Patient was told about the pathophysiology of the disease process and its modifying factors  · Smoking needs to be stopped otherwise patient will have increasing morbidity and mortality and patient can be respiratory cripple  · Patient to avoid allergens including cats  · Patient was told about the pathophysiology and sequelae of RUTH  · Patient will take Symbicort inhaler 2 puffs twice a day and to rinse mouth with water after use  · Patient to continue with Spiriva as before  · Patient also takes albuterol inhaler or nebulizer when required  · Patient was shown how to take it properly and was told to take once a day along with Symbicort on regular basis  · Patient to take albuterol inhaler 2 puffs every 6 on whenever necessary basis or nebulizer if need be  · Will reassess in the future about repeating a PFT  · Patient was told about diet and lifestyle modifications  · Patient's medication for PPI as per PCP to continue  · Patient needs to lose weight  · Patient needs to have a regular exercise and if patient cannot do any walking because of her neuropathy and patient may consider some water aerobics  · Smoking cessation is imperative  · Patient's sleep study shows patient to have a severe RUTH with AHI of 38.8/E and the implications of that result discussed with the patient in detail along with options  · After discussion CPAP with pressure of 10 ordered from [address] [place] [address] Se of patient's choice  · Patient was told about the compliance requirements with a CPAP  · Patien needs to be proactive in taking care of herself otherwise she will have increasing morbidity and mortality  · Patient needs to have a comprehensive plan about diet lifestyle modifications medications and follow-ups and smoking cessation  · Patient is up-to-date on the COVID-19 vaccine  · Patient will get a flu shot in the near future from her pharmacy/PCP  · Patient to follow-up in 2 months time with compliance data or earlier if required-would prefer to be seen in the office rather virtually

## 2021-10-06 NOTE — PROGRESS NOTES
MA Communication: The following orders are received by verbal communication from Jarvis Kaur MD    Orders include:  Pt requested that I mail CPAP orders to her. Orders mailed       Pt will call once set up to schedule fu visit.

## 2021-10-11 ENCOUNTER — TELEPHONE (OUTPATIENT)
Dept: PULMONOLOGY | Age: 46
End: 2021-10-11

## 2021-10-18 ENCOUNTER — TELEPHONE (OUTPATIENT)
Dept: PULMONOLOGY | Age: 46
End: 2021-10-18

## 2021-10-18 DIAGNOSIS — G47.33 OSA (OBSTRUCTIVE SLEEP APNEA): Primary | ICD-10-CM

## 2021-10-18 NOTE — TELEPHONE ENCOUNTER
Please place order for CPAP machine. Patient requesting order faxed to [facility].

## 2021-10-19 NOTE — TELEPHONE ENCOUNTER
I looked through all the orders on 10/6/21. They are all for CPAP supplies but there is no order for the actual CPAP.     Order pending if appropriate

## 2021-11-08 ENCOUNTER — TELEPHONE (OUTPATIENT)
Dept: FAMILY MEDICINE CLINIC | Age: 46
End: 2021-11-08

## 2021-11-09 RX ORDER — FLUTICASONE PROPIONATE 50 MCG
1 SPRAY, SUSPENSION (ML) NASAL DAILY
Qty: 1 EACH | Refills: 1 | Status: SHIPPED | OUTPATIENT
Start: 2021-11-09 | End: 2021-12-30 | Stop reason: SDUPTHER

## 2021-11-09 RX ORDER — ALBUTEROL SULFATE 2.5 MG/3ML
2.5 SOLUTION RESPIRATORY (INHALATION) EVERY 4 HOURS PRN
Qty: 120 EACH | Refills: 11 | Status: SHIPPED | OUTPATIENT
Start: 2021-11-09 | End: 2022-02-23 | Stop reason: SDUPTHER

## 2021-11-09 RX ORDER — CYCLOBENZAPRINE HCL 10 MG
10 TABLET ORAL 2 TIMES DAILY PRN
Qty: 28 TABLET | Refills: 0 | Status: SHIPPED | OUTPATIENT
Start: 2021-11-09 | End: 2021-11-23

## 2021-11-09 RX ORDER — BUDESONIDE AND FORMOTEROL FUMARATE DIHYDRATE 160; 4.5 UG/1; UG/1
2 AEROSOL RESPIRATORY (INHALATION) 2 TIMES DAILY
Qty: 10.2 G | Refills: 1 | Status: SHIPPED | OUTPATIENT
Start: 2021-11-09 | End: 2021-12-30 | Stop reason: SDUPTHER

## 2021-11-09 NOTE — TELEPHONE ENCOUNTER
Patient states she also needs refills on Doxycycline 100 mg bid, Theophylline 80-15ml 2 tsp bid, and Linzess 290 mcg daily. Patient states she didn't tell us she was on these at her appointment because she assumed they transferred over.

## 2021-11-10 RX ORDER — DOXYCYCLINE HYCLATE 100 MG
100 TABLET ORAL 2 TIMES DAILY
Qty: 14 TABLET | Refills: 0 | Status: SHIPPED | OUTPATIENT
Start: 2021-11-10 | End: 2021-11-17

## 2021-11-10 NOTE — TELEPHONE ENCOUNTER
Pt states she is still short of breath this is why she is asking for a refill on doxy. Her friend takes Theophylline and it really helps them, she is wanting to try this medication to see if it helps her breathing.  She has never taken it before

## 2021-11-10 NOTE — TELEPHONE ENCOUNTER
She was given a prescription for doxycycline for an acute infection over a month ago. This is not a chronic medication. I do not see theophylline listed on any of her medication list and I did not see that it was mentioned in her note when she saw pulmonology either. This needs clarified.

## 2021-11-11 ENCOUNTER — PATIENT MESSAGE (OUTPATIENT)
Dept: PULMONOLOGY | Age: 46
End: 2021-11-11

## 2021-11-11 RX ORDER — DOXYCYCLINE HYCLATE 100 MG
100 TABLET ORAL 2 TIMES DAILY
Qty: 14 TABLET | Refills: 0 | Status: SHIPPED | OUTPATIENT
Start: 2021-11-11 | End: 2021-11-18

## 2021-11-11 NOTE — TELEPHONE ENCOUNTER
Would not recommend theophylline  If patient has increased shortness of breath -needs to be evaluated in person by a provider to do the needful

## 2021-11-11 NOTE — TELEPHONE ENCOUNTER
From: Graciela Funk  To: Dr. Kena Martel: 11/11/2021 2:47 PM EST  Subject: Graciela Funk    I need theophylline 80 3teaspoonfuls by mouth two time a day

## 2021-11-11 NOTE — TELEPHONE ENCOUNTER
I do not see Theophylline on her current med list, nor do I see it mentioned in her office notes. Rx is pending. Please sign if appropriate.

## 2021-11-11 NOTE — TELEPHONE ENCOUNTER
Pt called and said she is having trouble breathing. This has been going on for about 3 weeks. She did a round of antibiotics and prednisone but is not getting better. Her PCP said to call Dr. Michael Masterson to have him prescribe something for her. She said her doctor recommended Theophylline may help patient but she would not prescribe it.

## 2021-11-12 ENCOUNTER — TELEPHONE (OUTPATIENT)
Dept: PULMONOLOGY | Age: 46
End: 2021-11-12

## 2021-11-15 ENCOUNTER — TELEPHONE (OUTPATIENT)
Dept: FAMILY MEDICINE CLINIC | Age: 46
End: 2021-11-15

## 2021-11-15 DIAGNOSIS — R11.2 NAUSEA AND VOMITING, INTRACTABILITY OF VOMITING NOT SPECIFIED, UNSPECIFIED VOMITING TYPE: Primary | ICD-10-CM

## 2021-11-15 DIAGNOSIS — R05.9 COUGH: ICD-10-CM

## 2021-11-15 RX ORDER — BENZONATATE 200 MG/1
200 CAPSULE ORAL 3 TIMES DAILY PRN
Qty: 21 CAPSULE | Refills: 0 | Status: SHIPPED | OUTPATIENT
Start: 2021-11-15 | End: 2021-12-14 | Stop reason: SDUPTHER

## 2021-11-15 RX ORDER — ONDANSETRON 4 MG/1
4 TABLET, FILM COATED ORAL 3 TIMES DAILY PRN
Qty: 30 TABLET | Refills: 0 | Status: SHIPPED | OUTPATIENT
Start: 2021-11-15 | End: 2021-12-30 | Stop reason: SDUPTHER

## 2021-11-15 NOTE — TELEPHONE ENCOUNTER
The patient was already treated with a steroid and antibiotics. As far as making air move eye opening of the bronchioles the patient is already currently taking medications at home such as albuterol and nebulizer. I advised the patient to make sure they are taking those medications as ordered. I can con antinausea medicine such as Zofran and cons of Tessalon Perles to help with cough. Outside of that if the patient feels like they are worsening since he will be seen in the emergency room patient should. I cannot physically assess this patient through messaging. They stated BP was low but does not give me any number readings. Will send Zofran and Tessalon to her pharmacy.

## 2021-11-15 NOTE — TELEPHONE ENCOUNTER
Pt saw [facility], Dr Yana Hughes, it was a vv 11/13/2021. Treated her for bronchitis. He put her on Prednisone and doxyclycine. She said that her breathing is worse. Needs something to open up her bronchials. Has a really heavy cough but nothing is moving. She said that it's causing GERD and now she's throwing up. She was told by pulm to call her PCP. Requesting a cough med and an anti nausea and something to open the bronchials. No fever, sugar has been low and BP is dropping. Figures it's b/c she's sick. Uses [facility].

## 2021-11-19 ENCOUNTER — TELEPHONE (OUTPATIENT)
Dept: FAMILY MEDICINE CLINIC | Age: 46
End: 2021-11-19

## 2021-11-19 NOTE — TELEPHONE ENCOUNTER
Pt was given Tessalon a few days ago for cough. It's not helping. Also she is asking for an order for glucometer and testing supplies. Pt is not diabetic but has low blood sugars at times.

## 2021-11-19 NOTE — TELEPHONE ENCOUNTER
----- Message from Jimbo Clarke sent at 11/19/2021  8:21 AM EST -----  Subject: Message to Provider    QUESTIONS  Information for Provider? Pt believes her blood sugar monitor is not   working properly She would like a script for a new monitor with strips   Please advise   ---------------------------------------------------------------------------  --------------  CALL BACK INFO  What is the best way for the office to contact you? OK to leave message on   voicemail, OK to respond with electronic message via [employer] portal (only   for patients who have registered [employer] account)  Preferred Call Back Phone Number? [phone]  ---------------------------------------------------------------------------  --------------  SCRIPT ANSWERS  Relationship to Patient?  Self

## 2021-12-03 RX ORDER — PSEUDOEPHEDRINE HCL 30 MG
100 TABLET ORAL 2 TIMES DAILY PRN
Qty: 60 CAPSULE | Refills: 1 | Status: SHIPPED | OUTPATIENT
Start: 2021-12-03 | End: 2021-12-30 | Stop reason: SDUPTHER

## 2021-12-14 DIAGNOSIS — R05.9 COUGH: ICD-10-CM

## 2021-12-14 RX ORDER — BENZONATATE 200 MG/1
200 CAPSULE ORAL 3 TIMES DAILY PRN
Qty: 21 CAPSULE | Refills: 0 | Status: SHIPPED | OUTPATIENT
Start: 2021-12-14 | End: 2021-12-21

## 2021-12-27 ENCOUNTER — HOSPITAL ENCOUNTER (EMERGENCY)
Age: 46
Discharge: HOME | End: 2021-12-27
Payer: MEDICARE

## 2021-12-27 ENCOUNTER — APPOINTMENT (OUTPATIENT)
Dept: GENERAL RADIOLOGY | Age: 46
End: 2021-12-27
Payer: MEDICARE

## 2021-12-27 ENCOUNTER — TELEPHONE (OUTPATIENT)
Dept: PULMONOLOGY | Age: 46
End: 2021-12-27

## 2021-12-27 VITALS
RESPIRATION RATE: 16 BRPM | SYSTOLIC BLOOD PRESSURE: 135 MMHG | WEIGHT: 210 LBS | DIASTOLIC BLOOD PRESSURE: 92 MMHG | OXYGEN SATURATION: 96 % | HEART RATE: 100 BPM | TEMPERATURE: 99.2 F | HEIGHT: 64 IN | BODY MASS INDEX: 35.85 KG/M2

## 2021-12-27 DIAGNOSIS — R06.02 SOB (SHORTNESS OF BREATH): Primary | ICD-10-CM

## 2021-12-27 DIAGNOSIS — J44.1 COPD EXACERBATION (HCC): Primary | ICD-10-CM

## 2021-12-27 DIAGNOSIS — J44.1 COPD EXACERBATION (HCC): ICD-10-CM

## 2021-12-27 DIAGNOSIS — R06.2 WHEEZING: ICD-10-CM

## 2021-12-27 LAB
INFLUENZA A: NOT DETECTED
INFLUENZA B: NOT DETECTED
SARS-COV-2 RNA, RT PCR: NOT DETECTED

## 2021-12-27 PROCEDURE — 71046 X-RAY EXAM CHEST 2 VIEWS: CPT

## 2021-12-27 PROCEDURE — 99283 EMERGENCY DEPT VISIT LOW MDM: CPT

## 2021-12-27 PROCEDURE — 87636 SARSCOV2 & INF A&B AMP PRB: CPT

## 2021-12-27 PROCEDURE — 6370000000 HC RX 637 (ALT 250 FOR IP): Performed by: NURSE PRACTITIONER

## 2021-12-27 RX ORDER — AZITHROMYCIN 250 MG/1
500 TABLET, FILM COATED ORAL ONCE
Status: DISCONTINUED | OUTPATIENT
Start: 2021-12-27 | End: 2021-12-27

## 2021-12-27 RX ORDER — CEFDINIR 300 MG/1
300 CAPSULE ORAL 2 TIMES DAILY
Qty: 14 CAPSULE | Refills: 0 | Status: SHIPPED | OUTPATIENT
Start: 2021-12-27 | End: 2022-01-03

## 2021-12-27 RX ORDER — CEFDINIR 300 MG/1
300 CAPSULE ORAL EVERY 12 HOURS SCHEDULED
Status: DISCONTINUED | OUTPATIENT
Start: 2021-12-27 | End: 2021-12-27 | Stop reason: HOSPADM

## 2021-12-27 RX ORDER — PREDNISONE 20 MG/1
40 TABLET ORAL DAILY
Qty: 10 TABLET | Refills: 0 | Status: SHIPPED | OUTPATIENT
Start: 2021-12-27 | End: 2021-12-28 | Stop reason: ALTCHOICE

## 2021-12-27 RX ORDER — PREDNISONE 20 MG/1
40 TABLET ORAL ONCE
Status: COMPLETED | OUTPATIENT
Start: 2021-12-27 | End: 2021-12-27

## 2021-12-27 RX ADMIN — PREDNISONE 40 MG: 20 TABLET ORAL at 20:54

## 2021-12-27 RX ADMIN — CEFDINIR 300 MG: 300 CAPSULE ORAL at 20:54

## 2021-12-27 ASSESSMENT — ENCOUNTER SYMPTOMS
ABDOMINAL PAIN: 0
BLOOD IN STOOL: 0
DIARRHEA: 0
EYE PAIN: 0
SHORTNESS OF BREATH: 0
SORE THROAT: 0
BACK PAIN: 0
VOMITING: 0
NAUSEA: 0
RHINORRHEA: 0
COUGH: 1

## 2021-12-27 NOTE — TELEPHONE ENCOUNTER
Patient has had multiple courses of antibiotics and steroids in recent past -needs CXR -PA and lateral and atleast a rapid COVID 19 test and then will follow up to see what needs to be done

## 2021-12-27 NOTE — TELEPHONE ENCOUNTER
Pt informed. She will get CXR done at [facility].  She is going to call back with the fax number. She will also get a rapid COVID test done. Will watch for results.

## 2021-12-28 RX ORDER — PREDNISONE 10 MG/1
TABLET ORAL
Qty: 30 TABLET | Refills: 0 | Status: SHIPPED | OUTPATIENT
Start: 2021-12-28 | End: 2022-02-01 | Stop reason: ALTCHOICE

## 2021-12-28 NOTE — ED PROVIDER NOTES
[address] ED  EMERGENCY DEPARTMENT ENCOUNTER        Pt Name: Aria Estrella  MRN: [id number]  [place] 1975  Date of evaluation: 12/27/2021  Provider: BELA Talavera - JAMESON  PCP: Ela Montemayor MD  Note Started: 8:33 PM EST      ERICH. I have evaluated this patient. My supervising physician was available for consultation. Triage CHIEF COMPLAINT       Chief Complaint   Patient presents with    Cough     cough x 1 week however dr medina meds a few weeks ago. Denies vomiting. Just taking cough syrup at home. HISTORY OF PRESENT ILLNESS   (Location/Symptom, Timing/Onset, Context/Setting, Quality, Duration, Modifying Factors, Severity)  Note limiting factors. Chief Complaint: Cough    Aria Estrella is a 42 y.o. female who presents to the emergency room with symptoms of cough and congestion. Symptoms of cough began approximately 3 or 4 days ago. States that she has a long history of COPD and has been seeing a pulmonologist.  States that she began with symptoms about 4 days ago. She is concerned for COPD exacerbation. Denies any productive cough. States that she does have an albuterol inhaler at home. Patient is afebrile. She denies any chest pain or back pain. No shortness of breath. Nursing Notes were all reviewed and agreed with or any disagreements were addressed in the HPI. REVIEW OF SYSTEMS    (2-9 systems for level 4, 10 or more for level 5)     Review of Systems   Constitutional: Negative for chills, diaphoresis and fever. HENT: Negative for congestion, ear pain, rhinorrhea and sore throat. Eyes: Negative for pain and visual disturbance. Respiratory: Positive for cough. Negative for shortness of breath. Cardiovascular: Negative for chest pain and leg swelling. Gastrointestinal: Negative for abdominal pain, blood in stool, diarrhea, nausea and vomiting. Genitourinary: Negative for difficulty urinating, dysuria, flank pain and frequency. Musculoskeletal: Negative for back pain and neck pain. Skin: Negative for rash and wound. Neurological: Negative for dizziness and light-headedness.        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 ([address]     Psychosis ([address]     PTSD (post-traumatic stress disorder)        SURGICAL HISTORY     Past Surgical History:   Procedure Laterality Date    ANKLE SURGERY Left     APPENDECTOMY  2021     LAPAROSCOPIC APPENDECTOMY      SECTION      HYSTERECTOMY, TOTAL ABDOMINAL      LAPAROSCOPIC APPENDECTOMY N/A 2021    LAPAROSCOPIC APPENDECTOMY performed by Oskar Styles MD at [facility]       Previous Medications    ALBUTEROL (PROVENTIL) (2.5 MG/3ML) 0.083% NEBULIZER SOLUTION    Take 3 mLs by nebulization every 4 hours as needed for Wheezing    ALBUTEROL SULFATE HFA (VENTOLIN HFA) 108 (90 BASE) MCG/ACT INHALER    Inhale 2 puffs into the lungs every 6 hours as needed for Shortness of Breath    ASPIRIN 81 MG EC TABLET    Take 1 tablet by mouth daily    ATORVASTATIN (LIPITOR) 10 MG TABLET    Take 1 tablet by mouth daily    BUDESONIDE-FORMOTEROL (SYMBICORT) 160-4.5 MCG/ACT AERO    Inhale 2 puffs into the lungs 2 times daily    CARVEDILOL (COREG) 6.25 MG TABLET    Take 1 tablet by mouth 2 times daily    DOCUSATE (COLACE, DULCOLAX) 100 MG CAPS    Take 100 mg by mouth 2 times daily as needed (.)    ESCITALOPRAM (LEXAPRO) 20 MG TABLET    Take 20 mg by mouth daily    FLUTICASONE (FLONASE) 50 MCG/ACT NASAL SPRAY    1 spray by Each Nostril route daily    FUROSEMIDE (LASIX) 40 MG TABLET    Take 40 mg by mouth daily    GABAPENTIN (NEURONTIN) 300 MG CAPSULE    Take 600 mg by mouth 3 times daily.      HYDROXYZINE HCL PO    Take 50 mg by mouth 2 times daily     LAMOTRIGINE (LAMICTAL) 25 MG TABLET    Take 50 mg by mouth daily     LEVOCETIRIZINE (XYZAL) 5 MG TABLET    Take 1 tablet by mouth nightly    LINACLOTIDE (LINZESS) 290 MCG CAPS CAPSULE    Take 1 capsule by mouth every morning (before breakfast)    OMEPRAZOLE (PRILOSEC) 40 MG DELAYED RELEASE CAPSULE    Take 1 capsule by mouth 2 times daily    ONDANSETRON (ZOFRAN) 4 MG TABLET    Take 1 tablet by mouth 3 times daily as needed for Nausea or Vomiting    OXYGEN    Inhale 2 L into the lungs     PALIPERIDONE (INVEGA) 9 MG EXTENDED RELEASE TABLET    Take 9 mg by mouth every morning    QUETIAPINE (SEROQUEL) 25 MG TABLET    Take 25 mg by mouth daily as needed     TIOTROPIUM (SPIRIVA RESPIMAT) 2.5 MCG/ACT AERS INHALER    INHALE 2 PUFFS INTO THE LUNGS DAILY    TIOTROPIUM (SPIRIVA RESPIMAT) 2.5 MCG/ACT AERS INHALER    Inhale 2 puffs into the lungs daily       ALLERGIES     Clindamycin/lincomycin, Penicillins, Amoxicillin, Citalopram hydrobromide, and Clavulanic acid    FAMILYHISTORY       Family History   Problem Relation Age of Onset    Cancer Mother     Cancer Father     Cancer Brother     Heart Disease Brother         SOCIAL HISTORY       Social History     Socioeconomic History    Marital status:      Spouse name: Not on file    Number of children: Not on file    Years of education: Not on file    Highest education level: Not on file   Occupational History    Not on file   Tobacco Use    Smoking status: Former Smoker     Packs/day: 0.50     Types: Cigarettes     Start date: 1/28/1994    Smokeless tobacco: Never Used   Vaping Use    Vaping Use: Never used   Substance and Sexual Activity    Alcohol use: No    Drug use: Not Currently     Types: Marijuana Jorge Bunn)    Sexual activity: Not Currently   Other Topics Concern    Not on file   Social History Narrative    Not on file     Social Determinants of Health     Financial Resource Strain:     Difficulty of Paying Living Expenses: Not on file   Food Insecurity:     Worried About Running Out of Food in the Last Year: Not on file    Franki of Food in the Last Year: Not on file   Transportation Needs:     Lack of Transportation (Medical): Not on file    Lack of Transportation (Non-Medical): Not on file   Physical Activity:     Days of Exercise per Week: Not on file    Minutes of Exercise per Session: Not on file   Stress:     Feeling of Stress : Not on file   Social Connections:     Frequency of Communication with Friends and Family: Not on file    Frequency of Social Gatherings with Friends and Family: Not on file    Attends Christian Services: Not on file    Active Member of [address] [place] [address] or Organizations: Not on file    Attends Club or Organization Meetings: Not on file    Marital Status: Not on file   Intimate Partner Violence:     Fear of Current or Ex-Partner: Not on file    Emotionally Abused: Not on file    Physically Abused: Not on file    Sexually Abused: Not on file   Housing Stability:     Unable to Pay for Housing in the Last Year: Not on file    Number of [place] in the Last Year: Not on file    Unstable Housing in the Last Year: Not on file       SCREENINGS             PHYSICAL EXAM    (up to 7 for level 4, 8 or more for level 5)     ED Triage Vitals [12/27/21 1929]   BP Temp Temp src Pulse Resp SpO2 Height Weight   (!) 153/91 99.2 °F (37.3 °C) -- 99 17 99 % 5' 4\" (1.626 m) 210 lb (95.3 kg)       Physical Exam  Vitals and nursing note reviewed. Constitutional:       Appearance: Normal appearance. She is not toxic-appearing or diaphoretic. HENT:      Head: Normocephalic and atraumatic. Nose: Nose normal.      Mouth/Throat:      Mouth: Mucous membranes are moist.   Eyes:      General:         Right eye: No discharge. Left eye: No discharge. Cardiovascular:      Rate and Rhythm: Normal rate and regular rhythm. Pulses: Normal pulses. Heart sounds: No murmur heard. Pulmonary:      Effort: Pulmonary effort is normal. No respiratory distress. Breath sounds: No wheezing or rhonchi. Abdominal:      Palpations: Abdomen is soft. Tenderness: There is no abdominal tenderness. There is no guarding or rebound. Musculoskeletal:         General: Normal range of motion. Cervical back: Normal range of motion and neck supple. Skin:     General: Skin is warm and dry. Capillary Refill: Capillary refill takes less than 2 seconds. Neurological:      General: No focal deficit present. Mental Status: She is alert and oriented to person, place, and time. Motor: No weakness. Gait: Gait normal.   Psychiatric:         Mood and Affect: Mood normal.         Behavior: Behavior normal.         DIAGNOSTIC RESULTS   LABS:    Labs Reviewed   COVID-19 & INFLUENZA COMBO    Narrative:     Performed at:  [facility]) [facility],  [place], [facility]   Phone [phone]       When ordered, only abnormal lab results are displayed. All other labs were within normal range or not returned as of this dictation. EKG: When ordered, EKG's are interpreted by the Emergency Department Physician in the absence of a cardiologist.  Please see their note for interpretation of EKG.       RADIOLOGY:   Non-plain film images such as CT, Ultrasound and MRI are read by the radiologist. Caprice Simmonds radiographic images are visualized andpreliminarily interpreted by the  ED Provider with the below findings:        Interpretation perthe Radiologist below, if available at the time of this note:    XR CHEST (2 VW)   Final Result   No acute cardiopulmonary disease. XR CHEST (2 VW)    Result Date: 12/27/2021  EXAMINATION: TWO XRAY VIEWS OF THE CHEST 12/27/2021 7:50 pm COMPARISON: 09/16/2019 HISTORY: ORDERING SYSTEM PROVIDED HISTORY: cough TECHNOLOGIST PROVIDED HISTORY: Reason for exam:->cough Reason for Exam: Cough (cough x 1 week however dr gave meds a few weeks ago. Denies vomiting. Just taking cough syrup at home. ) FINDINGS: The cardiac silhouette, mediastinal and hilar contours are normal.  The lungs are clear. There are no pleural effusions. No acute osseous abnormalities are identified. No acute cardiopulmonary disease. PROCEDURES   Unless otherwise noted below, none     Procedures    CRITICAL CARE TIME   N/A    CONSULTS:  None      EMERGENCY DEPARTMENT COURSE and DIFFERENTIAL DIAGNOSIS/MDM:   Vitals:    Vitals:    12/27/21 1929   BP: (!) 153/91   Pulse: 99   Resp: 17   Temp: 99.2 °F (37.3 °C)   SpO2: 99%   Weight: 210 lb (95.3 kg)   Height: 5' 4\" (1.626 m)       Patient was given thefollowing medications:  Medications   predniSONE (DELTASONE) tablet 40 mg (has no administration in time range)   cefdinir (OMNICEF) capsule 300 mg (has no administration in time range)           Patient is well-appearing. Patient displays no further acute complaints. She will be discharged home on steroids and antibiotics. No other acute complaints. Patient follow-up family doctor next few days. Patient also advised to call her pulmonologist for further evaluation and treatment. X-ray read as negative. COVID-19 and influenza test were negative. Patient is afebrile. She is not tachycardic. FINAL IMPRESSION      1. COPD exacerbation [facility])          DISPOSITION/PLAN   DISPOSITION Decision To Discharge 12/27/2021 08:29:24 PM      PATIENT REFERREDTO:  No follow-up provider specified.     DISCHARGE MEDICATIONS:  New Prescriptions    CEFDINIR (OMNICEF) 300 MG CAPSULE    Take 1 capsule by mouth 2 times daily for 7 days    PREDNISONE (DELTASONE) 20 MG TABLET    Take 2 tablets by mouth daily for 5 days       DISCONTINUED MEDICATIONS:  Discontinued Medications    No medications on file              (Please note that portions ofthis note were completed with a voice recognition program.  Efforts were made to edit the dictations but occasionally words are mis-transcribed.)    Yesi BELA Perez CNP (electronically signed)            [facility] BELA Perez CNP  12/27/21 [id number]

## 2021-12-29 ENCOUNTER — VIRTUAL VISIT (OUTPATIENT)
Dept: FAMILY MEDICINE CLINIC | Age: 46
End: 2021-12-29
Payer: MEDICARE

## 2021-12-29 DIAGNOSIS — F60.2 PERSONALITY DISORDER WITH PREDOMINANTLY SOCIOPATHIC OR ASOCIAL MANIFESTATION (HCC): Chronic | ICD-10-CM

## 2021-12-29 DIAGNOSIS — J44.9 CHRONIC OBSTRUCTIVE PULMONARY DISEASE, UNSPECIFIED COPD TYPE (HCC): ICD-10-CM

## 2021-12-29 DIAGNOSIS — J44.1 COPD EXACERBATION (HCC): Primary | ICD-10-CM

## 2021-12-29 DIAGNOSIS — G47.33 OSA (OBSTRUCTIVE SLEEP APNEA): ICD-10-CM

## 2021-12-29 PROCEDURE — 99442 PR PHYS/QHP TELEPHONE EVALUATION 11-20 MIN: CPT | Performed by: FAMILY MEDICINE

## 2021-12-29 RX ORDER — BENZONATATE 200 MG/1
200 CAPSULE ORAL 3 TIMES DAILY PRN
Qty: 30 CAPSULE | Refills: 0 | Status: SHIPPED | OUTPATIENT
Start: 2021-12-29 | End: 2022-01-05

## 2021-12-29 ASSESSMENT — ENCOUNTER SYMPTOMS
COUGH: 1
SHORTNESS OF BREATH: 1

## 2021-12-29 NOTE — PROGRESS NOTES
2021    TELEHEALTH EVALUATION -- Audio/Visual (During IATMT-84 public health emergency)    HPI:    Nikhil Goff (:  1975) has requested an audio/video evaluation for the following concern(s):    Pt recently seen in ER for copd exacerbation. Lungs seem to be more issues. Has been seeing pulm. Seeing Dr Romayne Ravel. Supposed to be getting cpap as well. Has home oxygen. Does not have portable. Gets oxygen via [employer]. Currently on prednisone and omnicef. Still w/ sig cough. Seeing pain mgmt at [facility]. No changes to meds recently. Does not have f/u appt w/ pulm/ sleep medicine at this time. Told significant apneic episodes. Does not have her CPAP yet. Is supposed to call and schedule appoint with pulmonology. Does use home oxygen, but would benefit from having portable oxygen available as well. Due for f/u appt w/ psych as well. Seen at [facility]. Review of Systems   Constitutional: Negative for fever. Respiratory: Positive for cough and shortness of breath. Musculoskeletal: Positive for arthralgias and myalgias. Psychiatric/Behavioral: Positive for sleep disturbance. Prior to Visit Medications    Medication Sig Taking? Authorizing Provider   benzonatate (TESSALON) 200 MG capsule Take 1 capsule by mouth 3 times daily as needed for Cough Yes Gayla Avelar MD   predniSONE (DELTASONE) 10 MG tablet 4 tabs x 3 days; then 3 tabs x 3 days; then 2 tabs x 3 days; then 1 tab x 3 days.  Yes Ana Gomez MD   linaclotide Ulyess Said) 290 MCG CAPS capsule Take 1 capsule by mouth every morning (before breakfast) Yes Gayla Avelar MD   docusate (COLACE, DULCOLAX) 100 MG CAPS Take 100 mg by mouth 2 times daily as needed (.) Yes Gayla Avelar MD   ondansetron (ZOFRAN) 4 MG tablet Take 1 tablet by mouth 3 times daily as needed for Nausea or Vomiting Yes Claudette Speller, APRN - CNP   albuterol (PROVENTIL) (2.5 MG/3ML) 0.083% nebulizer solution Take 3 mLs by nebulization every 4 hours as needed for Wheezing Yes Geroge Mcburney, APRN - CNP   budesonide-formoterol (SYMBICORT) 160-4.5 MCG/ACT AERO Inhale 2 puffs into the lungs 2 times daily Yes Geroge Mcburney, APRN - CNP   fluticasone (FLONASE) 50 MCG/ACT nasal spray 1 spray by Each Nostril route daily Yes Geroge Mcburney, APRN - CNP   omeprazole (PRILOSEC) 40 MG delayed release capsule Take 1 capsule by mouth 2 times daily Yes Jasmina Lopez MD   atorvastatin (LIPITOR) 10 MG tablet Take 1 tablet by mouth daily Yes Jasmina Lopez MD   levocetirizine (XYZAL) 5 MG tablet Take 1 tablet by mouth nightly Yes Jasmina Lopez MD   carvedilol (COREG) 6.25 MG tablet Take 1 tablet by mouth 2 times daily Yes Jasmina Lopez MD   aspirin 81 MG EC tablet Take 1 tablet by mouth daily Yes Jasmina Lopez MD   tiotropium (SPIRIVA RESPIMAT) 2.5 MCG/ACT AERS inhaler INHALE 2 PUFFS INTO THE LUNGS DAILY Yes Brandyn Peña MD   furosemide (LASIX) 40 MG tablet Take 40 mg by mouth daily Yes Historical Provider, MD   gabapentin (NEURONTIN) 300 MG capsule Take 600 mg by mouth 3 times daily.   Yes Historical Provider, MD   HYDROXYZINE HCL PO Take 50 mg by mouth 2 times daily  Yes Historical Provider, MD   lamoTRIgine (LAMICTAL) 25 MG tablet Take 50 mg by mouth daily  Yes Historical Provider, MD   QUEtiapine (SEROQUEL) 25 MG tablet Take 25 mg by mouth daily as needed  Yes Historical Provider, MD   OXYGEN Inhale 2 L into the lungs  Yes Historical Provider, MD   paliperidone (INVEGA) 9 MG extended release tablet Take 9 mg by mouth every morning Yes Historical Provider, MD   escitalopram (LEXAPRO) 20 MG tablet Take 20 mg by mouth daily Yes Historical Provider, MD   albuterol sulfate HFA (VENTOLIN HFA) 108 (90 Base) MCG/ACT inhaler Inhale 2 puffs into the lungs every 6 hours as needed for Shortness of Breath Yes Anastasia Bruce MD   cefdinir (OMNICEF) 300 MG capsule Take 1 capsule by mouth 2 times daily for 7 days  Patient not taking: Reported on 12/29/2021  BELA Leal - CNP       Social History     Tobacco Use    Smoking status: Former Smoker     Packs/day: 0.50     Types: Cigarettes     Start date: 1/28/1994    Smokeless tobacco: Never Used   Vaping Use    Vaping Use: Never used   Substance Use Topics    Alcohol use: No    Drug use: Not Currently     Types: Marijuana Pérezroslyn Fox)        Allergies   Allergen Reactions    Clindamycin/Lincomycin Other (See Comments), Hives, Itching and Shortness Of Breath     Weakness, and dizziness  Eye and throat swelling       Penicillins Swelling and Other (See Comments)     Other reaction(s): anaphylaxis/angioedema, anaphylaxis/angioedema  Throat swelling  Eye and throat swelling   Eye and throat swelling      Amoxicillin     Citalopram Hydrobromide      She thinks it made her bp to elevate    Clavulanic Acid Nausea And Vomiting   ,   Past Medical History:   Diagnosis Date    Arthritis     Asthma     [facility]     Chronic constipation     COPD (chronic obstructive pulmonary disease) ([facility])     CRPS (complex regional pain syndrome type II)     Dental abscess     Essential hypertension 08/12/2021    GERD (gastroesophageal reflux disease)     Hx of blood clots     hx of pos d dimer    Low back pain     Mixed hyperlipidemia 08/12/2021    Orthostatic hypotension     Osteoporosis     Peptic ulcer disease     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 ([facility]     Psychosis ([facility]     PTSD (post-traumatic stress disorder)        PHYSICAL EXAMINATION:  [ INSTRUCTIONS:  \"[x]\" Indicates a positive item  \"[]\" Indicates a negative item  -- DELETE ALL ITEMS NOT EXAMINED]  Vital Signs: (As obtained by patient/caregiver or practitioner observation)    Blood pressure-  Heart rate-    Respiratory rate-    Temperature-  Pulse oximetry-     Constitutional: [] Appears well-developed and well-nourished [] No apparent distress      [] Abnormal-   Mental status  [] Alert and awake  [] Oriented to person/place/time []Able to follow commands      Eyes:  EOM    []  Normal  [] Abnormal-  Sclera  []  Normal  [] Abnormal -         Discharge []  None visible  [] Abnormal -    HENT:   [] Normocephalic, atraumatic. [] Abnormal   [] Mouth/Throat: Mucous membranes are moist.     External Ears [] Normal  [] Abnormal-     Neck: [] No visualized mass     Pulmonary/Chest: [] Respiratory effort normal.  [] No visualized signs of difficulty breathing or respiratory distress        [] Abnormal-      Musculoskeletal:   [] Normal gait with no signs of ataxia         [] Normal range of motion of neck        [] Abnormal-       Neurological:        [] No Facial Asymmetry (Cranial nerve 7 motor function) (limited exam to video visit)          [] No gaze palsy        [] Abnormal-         Skin:        [] No significant exanthematous lesions or discoloration noted on facial skin         [] Abnormal-            Psychiatric:       [] Normal Affect [] No Hallucinations        [] Abnormal-     Other pertinent observable physical exam findings-   Attempted virtual visit. Patient was not able to activate her camera. We Did Talk Virtually through the ITema, but patient was not able to turn on her video. Audio only call     due to this being a TeleHealth encounter, evaluation of the following organ systems is limited: Vitals/Constitutional/EENT/Resp/CV/GI//MS/Neuro/Skin/Heme-Lymph-Imm. ASSESSMENT/PLAN:  1. COPD exacerbation ([facility]  Reviewed recent ER notes. Continue steroid and antibiotics as prescribed. Add cough medicine as below  - benzonatate (TESSALON) 200 MG capsule; Take 1 capsule by mouth 3 times daily as needed for Cough  Dispense: 30 capsule; Refill: 0    2. Chronic obstructive pulmonary disease, unspecified COPD type ([facility]  See above. Would also benefit from portable oxygen. Follow-up with pulmonology as scheduled    3.  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)  Follow-up with psychiatry as scheduled. 4. RUTH (obstructive sleep apnea)  Recommend follow-up with pulmonology/sleep medicine. Patient plans to call make an appointment      No follow-ups on file. An  electronic signature was used to authenticate this note. --Miranda Anaya MD on 12/29/2021 at 5:49 PM    This document was prepared by a combination of typing and transcription through a voice recognition software. Pursuant to the emergency declaration under the [address] [place] [address], [facility] waiver authority and the [employer] Act, this Virtual  Visit was conducted, with patient's consent, to reduce the patient's risk of exposure to COVID-19 and provide continuity of care for an established patient. Services were provided through a video synchronous discussion virtually to substitute for in-person clinic visit.

## 2021-12-30 DIAGNOSIS — R11.2 NAUSEA AND VOMITING, INTRACTABILITY OF VOMITING NOT SPECIFIED, UNSPECIFIED VOMITING TYPE: ICD-10-CM

## 2021-12-30 RX ORDER — FLUTICASONE PROPIONATE 50 MCG
1 SPRAY, SUSPENSION (ML) NASAL DAILY
Qty: 1 EACH | Refills: 3 | Status: SHIPPED | OUTPATIENT
Start: 2021-12-30 | End: 2022-05-16

## 2021-12-30 RX ORDER — FUROSEMIDE 40 MG/1
40 TABLET ORAL DAILY
Qty: 30 TABLET | Refills: 5 | Status: SHIPPED | OUTPATIENT
Start: 2021-12-30 | End: 2022-07-22

## 2021-12-30 RX ORDER — LAMOTRIGINE 25 MG/1
50 TABLET ORAL DAILY
Qty: 60 TABLET | Refills: 5 | Status: SHIPPED | OUTPATIENT
Start: 2021-12-30 | End: 2022-09-07 | Stop reason: SDUPTHER

## 2021-12-30 RX ORDER — PSEUDOEPHEDRINE HCL 30 MG
100 TABLET ORAL 2 TIMES DAILY PRN
Qty: 60 CAPSULE | Refills: 5 | Status: SHIPPED | OUTPATIENT
Start: 2021-12-30 | End: 2022-07-22

## 2021-12-30 RX ORDER — BUDESONIDE AND FORMOTEROL FUMARATE DIHYDRATE 160; 4.5 UG/1; UG/1
2 AEROSOL RESPIRATORY (INHALATION) 2 TIMES DAILY
Qty: 10.2 G | Refills: 3 | Status: SHIPPED | OUTPATIENT
Start: 2021-12-30 | End: 2022-05-31 | Stop reason: SDUPTHER

## 2021-12-30 RX ORDER — ONDANSETRON 4 MG/1
4 TABLET, FILM COATED ORAL 3 TIMES DAILY PRN
Qty: 30 TABLET | Refills: 0 | Status: SHIPPED | OUTPATIENT
Start: 2021-12-30 | End: 2022-06-09

## 2022-01-15 ENCOUNTER — CLINICAL DOCUMENTATION (OUTPATIENT)
Dept: OTHER | Age: 47
End: 2022-01-15

## 2022-02-01 ENCOUNTER — VIRTUAL VISIT (OUTPATIENT)
Dept: FAMILY MEDICINE CLINIC | Age: 47
End: 2022-02-01
Payer: MEDICARE

## 2022-02-01 DIAGNOSIS — R19.7 DIARRHEA, UNSPECIFIED TYPE: ICD-10-CM

## 2022-02-01 DIAGNOSIS — R10.84 GENERALIZED ABDOMINAL PAIN: Primary | ICD-10-CM

## 2022-02-01 LAB
A/G RATIO: 1.9 G/DL (ref 1–2.5)
ALBUMIN SERPL-MCNC: 4.6 G/DL (ref 3.5–5)
ALP BLD-CCNC: 96 U/L (ref 38–126)
ALT SERPL-CCNC: 32 U/L (ref 0–34)
AMORPHOUS: NEGATIVE
ANION GAP SERPL CALCULATED.3IONS-SCNC: 9.5 MMOL/L (ref 8–16)
AST SERPL-CCNC: 33 U/L (ref 14–36)
BACTERIA: NORMAL
BASOPHILS RELATIVE PERCENT: 1.1 % (ref 0–1)
BILIRUB SERPL-MCNC: 0.3 MG/DL (ref 0.2–1.3)
BILIRUBIN URINE: NEGATIVE
BUN BLDV-MCNC: 11 MG/DL (ref 7–17)
CALCIUM SERPL-MCNC: 9.6 MG/DL (ref 8.6–10.3)
CASTS: NEGATIVE
CHLORIDE BLD-SCNC: 101 MMOL/L (ref 98–107)
CLARITY: CLEAR
CO2: 31 MMOL/L (ref 22–30)
COLOR: YELLOW
CREAT SERPL-MCNC: 0.9 MG/DL (ref 0.52–1.04)
CRYSTALS, UA: NEGATIVE
EOSINOPHILS ABSOLUTE: 2 % (ref 0–5)
EOSINOPHILS RELATIVE PERCENT: 4.6 % (ref 0–5)
EPITHELIAL CELLS: NORMAL
ERYTHROCYTE DISTRIBUTION WIDTH RBC RATIO: 15.1 % (ref 11.6–14.8)
ERYTHROCYTES URINE: NORMAL
ERYTHROCYTES URINE: NORMAL
GFR CALCULATED: 68
GLOBULIN: 2.5 G/DL (ref 2–3.5)
GLUCOSE BLD-MCNC: 91 MG/DL (ref 74–100)
GLUCOSE URINE: NEGATIVE
GRANULOCYTE ABSOLUTE COUNT: 47 % (ref 40–70)
GRANULOCYTE ABSOLUTE COUNT: 5.5 X(10)3/UL (ref 1.8–7.2)
HCT VFR BLD CALC: 42.5 % (ref 35.7–46.7)
HEMOGLOBIN: 13.6 G/DL (ref 11.9–15.9)
IMMATURE GRANULOCYTES %: 0.5 % (ref 0–0.5)
INTERNAL QC: NORMAL
INTERNAL QC: NORMAL
KETONES, URINE: NEGATIVE
LEUKOCYTE ESTERASE, URINE: NEGATIVE
LEUKOCYTES, UA: NORMAL
LIPASE: 192 U/L (ref 23–300)
LYMPHOCYTES ABSOLUTE: 42 % (ref 15–45)
LYMPHOCYTES ABSOLUTE: 5.2 X(10)3/UL (ref 1.1–2.7)
LYMPHOCYTES RELATIVE PERCENT: 42.7 % (ref 15–45)
LYMPHS ABSOLUTE COUNT LARGE: 2 %
Lab: NEGATIVE
MCH RBC QN AUTO: 28.2 PG (ref 28.1–33.6)
MCHC RBC AUTO-ENTMCNC: 32 G/DL (ref 32.8–34.7)
MCV RBC AUTO: 88.2 FL (ref 82.9–99.9)
MONOCYTES ABSOLUTE: 7 % (ref 0–12)
MONOCYTES RELATIVE PERCENT: 6.7 % (ref 0–12)
MUCUS, URINE: NORMAL
NEUTROPHILS/100 LEUKOCYTES: 44.4 % (ref 40–70)
NITRITE SER/PLAS SCNC PT QN: NEGATIVE
PH UA: 5.5
PLATELETS: 342 X1000 (ref 175–420)
PLATELETS: NORMAL
POTASSIUM SERPL-SCNC: 3.5 MMOL/L (ref 3.4–5.1)
PROTEIN FLUID: NEGATIVE MG/DL
RBC # BLD: 4.82 X1000000 (ref 3.72–5.31)
RBC # BLD: NORMAL 10*6/UL
SARS-COV-2, NAA: NEGATIVE
SODIUM BLD-SCNC: 138 MMOL/L (ref 137–145)
SPECIFIC GRAVITY UA: 1.02 (ref 1–1.03)
TOTAL PROTEIN: 7.1 G/DL (ref 6.3–8.2)
TRICHOMONAS: NEGATIVE
UROBILINOGEN, URINE: 0.2 MG/DL (ref 0.2–1)
WBC # BLD: 12.3 X1000 (ref 4.5–10.3)
YEAST, URINE: NEGATIVE

## 2022-02-01 PROCEDURE — 98967 PH1 ASSMT&MGMT NQHP 11-20: CPT

## 2022-02-01 RX ORDER — HYDROCODONE BITARTRATE AND ACETAMINOPHEN 7.5; 325 MG/1; MG/1
1 TABLET ORAL EVERY 6 HOURS PRN
COMMUNITY
End: 2022-10-21

## 2022-02-01 ASSESSMENT — ENCOUNTER SYMPTOMS
TROUBLE SWALLOWING: 0
EYE PAIN: 0
DIARRHEA: 1
COUGH: 0
ABDOMINAL DISTENTION: 1
COLOR CHANGE: 0
CHOKING: 0
CONSTIPATION: 0
EYE DISCHARGE: 0
SORE THROAT: 0
CHEST TIGHTNESS: 0
RHINORRHEA: 0
WHEEZING: 0
SHORTNESS OF BREATH: 0
ABDOMINAL PAIN: 1

## 2022-02-01 NOTE — PROGRESS NOTES
MG tablet Take 20 mg by mouth daily Yes Historical Provider, MD   albuterol sulfate HFA (VENTOLIN HFA) 108 (90 Base) MCG/ACT inhaler Inhale 2 puffs into the lungs every 6 hours as needed for Shortness of Breath Yes Skinny Sanchez MD   HYDROcodone-acetaminophen (NORCO) 7.5-325 MG per tablet Take 1 tablet by mouth every 6 hours as needed.   Historical Provider, MD   docusate (COLACE, DULCOLAX) 100 MG CAPS Take 100 mg by mouth 2 times daily as needed (.)  Patient not taking: Reported on 2/1/2022  Paige Llanes MD       Social History     Tobacco Use    Smoking status: Former Smoker     Packs/day: 0.50     Types: Cigarettes     Start date: 1/28/1994    Smokeless tobacco: Never Used   Vaping Use    Vaping Use: Never used   Substance Use Topics    Alcohol use: No    Drug use: Not Currently     Types: Marijuana Andrew Free)        Allergies   Allergen Reactions    Clindamycin/Lincomycin Other (See Comments), Hives, Itching and Shortness Of Breath     Weakness, and dizziness  Eye and throat swelling       Penicillins Swelling and Other (See Comments)     Other reaction(s): anaphylaxis/angioedema, anaphylaxis/angioedema  Throat swelling  Eye and throat swelling   Eye and throat swelling      Amoxicillin     Citalopram Hydrobromide      She thinks it made her bp to elevate    Clavulanic Acid Nausea And Vomiting   ,   Past Medical History:   Diagnosis Date    Arthritis     Asthma     Bipolar 1 disorder ([facility])     Chronic constipation     COPD (chronic obstructive pulmonary disease) ([facility])     CRPS (complex regional pain syndrome type II)     Dental abscess     Essential hypertension 08/12/2021    GERD (gastroesophageal reflux disease)     Hx of blood clots     hx of pos d dimer    Low back pain     Mixed hyperlipidemia 08/12/2021    Orthostatic hypotension     Osteoporosis     Peptic ulcer disease     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 This was a telephone call. I was unable to connect through video visit because Internet was down on my and which did not allow me to connect with the patient. ASSESSMENT/PLAN:  1. Generalized abdominal pain  Patient is having generalized abdominal pain in her entire abdomen. States it is firm, distended, painful, and she is having x10 episodes of diarrhea daily for the last week. Given the fact the cannot assess the patient I advised her to be seen in the ED given her description of symptoms. Patient states she will go to [facility] for evaluation. Denies seeing any blood in her stool. States she is not running a fever and does not feel ill otherwise outside of her abdominal area discomfort. 2. Diarrhea, unspecified type  Patient evaluated today by telephone. Patient states she has had ongoing diarrhea since May after having her appendix removed. Patient states in the last week her episodes of diarrhea have been 10 times a day with significant abdominal pain, bloating, swelling, and firmness. Patient is concerned something is wrong. I cannot physically assess the patient's abdomen since this is a telephone visit. I advised the patient she should be seen in the ED given the fact her abdominal pain is significant and also because of her stenting her abdomen has grew significantly in size and is firm. No follow-ups on file. An  electronic signature was used to authenticate this note. This document was prepared by a combination of typing and transcription through a voice recognition software.       --BELA Matute - CNP on 2/1/2022 at 9:17 AM        Pursuant to the emergency declaration under the [facility], [facility] waiver authority and the [employer] Act, this Virtual  Visit was conducted, with patient's consent, to reduce the patient's risk of exposure to COVID-19 and provide continuity of care for an established patient. Services were provided through a video synchronous discussion virtually to substitute for in-person clinic visit.

## 2022-02-02 ENCOUNTER — TELEPHONE (OUTPATIENT)
Dept: FAMILY MEDICINE CLINIC | Age: 47
End: 2022-02-02

## 2022-02-02 NOTE — TELEPHONE ENCOUNTER
Is okay to schedule the patient on available time slot that I have when she is available in the near future.

## 2022-02-02 NOTE — TELEPHONE ENCOUNTER
Pt called. Said that you sent her over to the ER. She got her scan. Looks unremarkable according to the report. She said that the ER doctor told her that she has colitis and something else which he gave her meds for. She hasn't started them yet. She's at the pharmacy right now to pick them up. I called [facility] for records. When do you want her to follow up with you?

## 2022-02-08 ENCOUNTER — VIRTUAL VISIT (OUTPATIENT)
Dept: FAMILY MEDICINE CLINIC | Age: 47
End: 2022-02-08
Payer: MEDICARE

## 2022-02-08 DIAGNOSIS — Z09 HOSPITAL DISCHARGE FOLLOW-UP: Primary | ICD-10-CM

## 2022-02-08 DIAGNOSIS — R63.5 WEIGHT GAIN: ICD-10-CM

## 2022-02-08 DIAGNOSIS — M79.89 LEG SWELLING: ICD-10-CM

## 2022-02-08 PROCEDURE — 98967 PH1 ASSMT&MGMT NQHP 11-20: CPT

## 2022-02-08 RX ORDER — PROMETHAZINE HYDROCHLORIDE 25 MG/1
25 TABLET ORAL EVERY 6 HOURS PRN
COMMUNITY
End: 2022-10-21 | Stop reason: SDUPTHER

## 2022-02-08 RX ORDER — CIPROFLOXACIN 500 MG/1
500 TABLET, FILM COATED ORAL 2 TIMES DAILY
COMMUNITY
End: 2022-06-09

## 2022-02-08 RX ORDER — PANTOPRAZOLE SODIUM 40 MG/1
40 GRANULE, DELAYED RELEASE ORAL DAILY
COMMUNITY

## 2022-02-08 RX ORDER — METRONIDAZOLE 500 MG/1
500 TABLET ORAL 3 TIMES DAILY
COMMUNITY
End: 2022-07-29 | Stop reason: ALTCHOICE

## 2022-02-08 ASSESSMENT — ENCOUNTER SYMPTOMS
COUGH: 0
EYE PAIN: 0
EYE DISCHARGE: 0
RHINORRHEA: 0
CONSTIPATION: 0
CHEST TIGHTNESS: 0
ABDOMINAL PAIN: 0
COLOR CHANGE: 0
DIARRHEA: 0
CHOKING: 0
SORE THROAT: 0
WHEEZING: 0
ABDOMINAL DISTENTION: 0
SHORTNESS OF BREATH: 0
TROUBLE SWALLOWING: 0

## 2022-02-08 NOTE — PROGRESS NOTES
2022    TELEHEALTH EVALUATION -- Audio/Visual (During LFTTG-53 public health emergency)    HPI:    Mildred Henderson (:  1975) has requested an audio/video evaluation for the following concern(s):    Patient was seen at [facility] ED on 2022 for abdominal pain. I sent the patient to the ED for evaluation based on her video visit with me when she had complaints of a swollen abdomen with significant abdominal pain. Patient was ultimately diagnosed in the ED with infectious gastroenteritis and colitis. Was placed on Cipro and Flagyl 10-day course. Also given Protonix, Bentyl, Zofran, Phenergan, Ultram.  Is overall feeling better and feels like the medication he gave her is helping her abdominal pain and things are improving. States she has gained 12 pounds since her 2021 visit with us. Is having a lot of swelling in her feet and ankles. Feel tight and itchy. States the ED physician told her she was on a light dose of Lasix. Review of Systems   Constitutional: Negative for activity change, appetite change, chills, fatigue, fever and unexpected weight change. HENT: Negative for rhinorrhea, sore throat and trouble swallowing. Eyes: Negative for pain, discharge and visual disturbance. Respiratory: Negative for cough, choking, chest tightness, shortness of breath and wheezing. Cardiovascular: Negative for chest pain, palpitations and leg swelling. Gastrointestinal: Negative for abdominal distention, abdominal pain, constipation and diarrhea. Endocrine: Negative for cold intolerance and heat intolerance. Genitourinary: Negative for difficulty urinating. Musculoskeletal: Negative for gait problem and neck stiffness. Skin: Negative for color change and rash. Neurological: Negative for dizziness, weakness and headaches. Psychiatric/Behavioral: Negative for dysphoric mood and sleep disturbance. Prior to Visit Medications    Medication Sig Taking?  Authorizing Provider   ciprofloxacin (CIPRO) 500 MG tablet Take 500 mg by mouth 2 times daily Yes Historical Provider, MD   promethazine (PHENERGAN) 25 MG tablet Take 25 mg by mouth every 6 hours as needed for Nausea Yes Historical Provider, MD   pantoprazole sodium (PROTONIX) 40 MG PACK packet Take 40 mg by mouth daily Yes Historical Provider, MD   metroNIDAZOLE (FLAGYL) 500 MG tablet Take 500 mg by mouth 3 times daily Yes Historical Provider, MD   HYDROcodone-acetaminophen (NORCO) 7.5-325 MG per tablet Take 1 tablet by mouth every 6 hours as needed.  Yes Historical Provider, MD   ondansetron (ZOFRAN) 4 MG tablet Take 1 tablet by mouth 3 times daily as needed for Nausea or Vomiting Yes Katy Murphy MD   lamoTRIgine (LAMICTAL) 25 MG tablet Take 2 tablets by mouth daily Yes Katy Murphy MD   fluticasone (FLONASE) 50 MCG/ACT nasal spray 1 spray by Each Nostril route daily Yes Katy Murphy MD   budesonide-formoterol (SYMBICORT) 160-4.5 MCG/ACT AERO Inhale 2 puffs into the lungs 2 times daily Yes Katy Murphy MD   furosemide (LASIX) 40 MG tablet Take 1 tablet by mouth daily Yes Katy Murphy MD   linaclotide (LINZESS) 290 MCG CAPS capsule Take 1 capsule by mouth every morning (before breakfast) Yes Katy Murphy MD   docusate (COLACE, DULCOLAX) 100 MG CAPS Take 100 mg by mouth 2 times daily as needed (.) Yes Katy Murphy MD   albuterol (PROVENTIL) (2.5 MG/3ML) 0.083% nebulizer solution Take 3 mLs by nebulization every 4 hours as needed for Wheezing Yes BELA Mccurdy - CNP   omeprazole (PRILOSEC) 40 MG delayed release capsule Take 1 capsule by mouth 2 times daily Yes Katy Murphy MD   atorvastatin (LIPITOR) 10 MG tablet Take 1 tablet by mouth daily Yes Katy Murphy MD   levocetirizine (XYZAL) 5 MG tablet Take 1 tablet by mouth nightly Yes Katy Murphy MD   carvedilol (COREG) 6.25 MG tablet Take 1 tablet by mouth 2 times daily Yes Katy Murphy MD   aspirin 81 MG EC tablet Take 1 tablet by mouth daily Yes Olivier Gandhi MD   tiotropium (SPIRIVA RESPIMAT) 2.5 MCG/ACT AERS inhaler INHALE 2 PUFFS INTO THE LUNGS DAILY Yes Pablo Velez MD   gabapentin (NEURONTIN) 300 MG capsule Take 600 mg by mouth 3 times daily.   Yes Historical Provider, MD   HYDROXYZINE HCL PO Take 50 mg by mouth 2 times daily  Yes Historical Provider, MD   QUEtiapine (SEROQUEL) 25 MG tablet Take 25 mg by mouth daily as needed  Yes Historical Provider, MD   OXYGEN Inhale 2 L into the lungs  Yes Historical Provider, MD   paliperidone (INVEGA) 9 MG extended release tablet Take 9 mg by mouth every morning Yes Historical Provider, MD   escitalopram (LEXAPRO) 20 MG tablet Take 20 mg by mouth daily Yes Historical Provider, MD   albuterol sulfate HFA (VENTOLIN HFA) 108 (90 Base) MCG/ACT inhaler Inhale 2 puffs into the lungs every 6 hours as needed for Shortness of Breath Yes Yadi Kenney MD       Social History     Tobacco Use    Smoking status: Former Smoker     Packs/day: 0.50     Types: Cigarettes     Start date: 1/28/1994    Smokeless tobacco: Never Used   Vaping Use    Vaping Use: Never used   Substance Use Topics    Alcohol use: No    Drug use: Not Currently     Types: Marijuana (Weed)        Allergies   Allergen Reactions    Clindamycin/Lincomycin Other (See Comments), Hives, Itching and Shortness Of Breath     Weakness, and dizziness  Eye and throat swelling       Penicillins Swelling and Other (See Comments)     Other reaction(s): anaphylaxis/angioedema, anaphylaxis/angioedema  Throat swelling  Eye and throat swelling   Eye and throat swelling      Amoxicillin     Citalopram Hydrobromide      She thinks it made her bp to elevate    Clavulanic Acid Nausea And Vomiting   ,   Past Medical History:   Diagnosis Date    Arthritis     Asthma     [facility] ([facility]     Chronic constipation     [facility]     CRPS (complex regional pain syndrome type II)     Dental abscess     Essential hypertension 08/12/2021    GERD (gastroesophageal reflux disease)     Hx of blood clots     hx of pos d dimer    Low back pain     Mixed hyperlipidemia 08/12/2021    Orthostatic hypotension     Osteoporosis     Peptic ulcer disease     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 Patient was unable to connect to video visit and we did a telephone call assessment today. ASSESSMENT/PLAN:  1. Hospital discharge follow-up  Patient evaluated through telephone call today. She was unable to connect to the video visit. Patient seen to follow-up on her ED visit for abdominal pain. See above HPI. Patient overall feeling much better after being on antibiotics. Patient continue taking antibiotics until finished. Patient may follow back up with office as needed for any questions or concerns regarding her condition. 2. Leg swelling  Patient states she has gained 12 pounds since her last office visit on 12/27/2021. States her legs are swollen and feel tight and itchy. Advised the patient to take an 20mg dose of Lasix every other day for 1 week. Patient will cut her current 40 mg tablets in half. We will going to do next 20 mg every other day for a week versus an extra 40 mg because her potassium was 3.5 on 2/1/2022. Discussed the patient I do not want to increase Lasix too much because there is a potential to lower her potassium with the extra fluid excretion. We will follow-up in 2 weeks to verify the effectiveness of additional Lasix dosing. Will check blood pressure, weight, and doing repeat laboratory testing that is indicated at that time. Patient vies to follow-up with office sooner than 2 weeks if she has any adverse effects from the extra Lasix dosing.   Advised to monitor for low blood pressure, lightheaded, dizziness. 3. Weight gain  Patient is gained 12 pounds and feels the fluid is collecting in her lower extremities causing edema. We will add an extra dose of Lasix every other day for 1 week and follow-up with the patient in a 2-week office visit. No follow-ups on file. An  electronic signature was used to authenticate this note. --Jillian Arroyo, BELA - CNP on 2/8/2022 at 9:26 AM        Pursuant to the emergency declaration under the [address] [place] [address], [facility] waiver authority and the [employer] Act, this Virtual  Visit was conducted, with patient's consent, to reduce the patient's risk of exposure to COVID-19 and provide continuity of care for an established patient. Services were provided through a video synchronous discussion virtually to substitute for in-person clinic visit.

## 2022-02-17 ENCOUNTER — TELEPHONE (OUTPATIENT)
Dept: PULMONOLOGY | Age: 47
End: 2022-02-17

## 2022-02-17 NOTE — TELEPHONE ENCOUNTER
Pt called for multiple reasons:    1. Pt wants original referral faxed to [facility] at [phone] so that she can get free transportation to visits. Referral printed and faxed. 2.  Pt wants to reschedule an appt that was cancelled previously due to Dr. Jose Antonio Covarrubias being in ICU. It was rescheduled for 4/1/22. 3.  Pt has not been set up on CPAP yet. She called [facility]  and they gave her the run around and have not even processed her order yet. She wants to use a different DME. I told her to contact her insurance and find out what other DMEs she can use. She will call us back with that name.

## 2022-02-18 ENCOUNTER — TELEPHONE (OUTPATIENT)
Dept: FAMILY MEDICINE CLINIC | Age: 47
End: 2022-02-18

## 2022-02-18 DIAGNOSIS — E66.01 SEVERE OBESITY (BMI 35.0-35.9 WITH COMORBIDITY) (HCC): Primary | ICD-10-CM

## 2022-02-22 ENCOUNTER — TELEPHONE (OUTPATIENT)
Dept: FAMILY MEDICINE CLINIC | Age: 47
End: 2022-02-22

## 2022-02-22 NOTE — TELEPHONE ENCOUNTER
----- Message from Pal Phillipsyoselyn sent at 2/22/2022  8:22 AM EST -----  Subject: Message to Provider    QUESTIONS  Information for Provider? [facility]   to the weight loss clinic Fax [phone] Or Mail to [address] [facility] [place] [phone]  ---------------------------------------------------------------------------  --------------  [address]  What is the best way for the office to contact you? OK to leave message on   voicemail  Preferred Call Back Phone Number? [phone]  ---------------------------------------------------------------------------  --------------  SCRIPT ANSWERS  Relationship to Patient?  Self

## 2022-02-23 RX ORDER — ALBUTEROL SULFATE 2.5 MG/3ML
2.5 SOLUTION RESPIRATORY (INHALATION) EVERY 4 HOURS PRN
Qty: 120 EACH | Refills: 11 | Status: SHIPPED | OUTPATIENT
Start: 2022-02-23 | End: 2022-09-07 | Stop reason: SDUPTHER

## 2022-02-23 RX ORDER — ALBUTEROL SULFATE 90 UG/1
2 AEROSOL, METERED RESPIRATORY (INHALATION) EVERY 6 HOURS PRN
Qty: 18 G | Refills: 3 | Status: SHIPPED | OUTPATIENT
Start: 2022-02-23 | End: 2022-05-31 | Stop reason: SDUPTHER

## 2022-03-16 ENCOUNTER — TELEPHONE (OUTPATIENT)
Dept: FAMILY MEDICINE CLINIC | Age: 47
End: 2022-03-16

## 2022-03-16 NOTE — TELEPHONE ENCOUNTER
Patient wanting a referral to [facility] for transportation, needs to have that she needs routine check ups.

## 2022-03-16 NOTE — TELEPHONE ENCOUNTER
----- Message from Sherrill De Oliveira sent at 3/16/2022  3:11 PM EDT -----  Subject: Referral Request    QUESTIONS   Reason for referral request? Pt requesting a referral to [facility] for   transportation for every appointment. Referral can be sent via fax   [phone] Please advise. Has the physician seen you for this condition before? No   Preferred Specialist (if applicable)? Do you already have an appointment scheduled? Additional Information for Provider?   ---------------------------------------------------------------------------  --------------  CALL BACK INFO  What is the best way for the office to contact you? OK to leave message on   voicemail  Preferred Call Back Phone Number?  [phone]

## 2022-03-23 ENCOUNTER — SCHEDULED TELEPHONE ENCOUNTER (OUTPATIENT)
Dept: FAMILY MEDICINE CLINIC | Age: 47
End: 2022-03-23
Payer: MEDICARE

## 2022-03-23 DIAGNOSIS — J01.90 ACUTE BACTERIAL SINUSITIS: Primary | ICD-10-CM

## 2022-03-23 DIAGNOSIS — B96.89 ACUTE BACTERIAL SINUSITIS: Primary | ICD-10-CM

## 2022-03-23 PROCEDURE — 98966 PH1 ASSMT&MGMT NQHP 5-10: CPT

## 2022-03-23 RX ORDER — CEFDINIR 300 MG/1
300 CAPSULE ORAL 2 TIMES DAILY
Qty: 20 CAPSULE | Refills: 0 | Status: SHIPPED | OUTPATIENT
Start: 2022-03-23 | End: 2022-06-24 | Stop reason: SDUPTHER

## 2022-03-23 NOTE — PROGRESS NOTES
Jose Larson is a 41 y.o. female evaluated via telephone on 3/23/2022. Consent:  She and/or health care decision maker is aware that that she may receive a bill for this telephone service, depending on her insurance coverage, and has provided verbal consent to proceed: Yes      Documentation:  I communicated with the patient and/or health care decision maker about current medical symptoms and concerns. Details of this discussion including any medical advice provided:     HPI:  x2 weeks sinus/chest congestion, productive green/yellow sputum, significant maxillary sinus pressure,popping in bilateral ears. Denies fever. Not currently using OTC meds for symptoms. 1. Acute bacterial sinusitis  See above HPI for symptoms and onset. Given the patient's duration of illness we will go and treat this time for acute bacterial sinusitis. Continue using Flonase and Xyzal the patient has order daily. Follow back up with office if symptoms fail to improve or worsen. - cefdinir (OMNICEF) 300 MG capsule; Take 1 capsule by mouth 2 times daily for 10 days  Dispense: 20 capsule; Refill: 0        I affirm this is a Patient Initiated Episode with an Established Patient who has not had a related appointment within my department in the past 7 days or scheduled within the next 24 hours. Total Time: minutes: 5-10 minutes    Note: not billable if this call serves to triage the patient into an appointment for the relevant concern    This document was prepared by a combination of typing and transcription through a voice recognition software.     BELA Mao - JAMESON    [place], [place]

## 2022-04-01 ENCOUNTER — TELEPHONE (OUTPATIENT)
Dept: PULMONOLOGY | Age: 47
End: 2022-04-01

## 2022-04-01 NOTE — TELEPHONE ENCOUNTER
Patient did not show for OV  with Dr. Philip Harrell on 4/1/22. Reason:  na    This is patient's first no show. Patient was ano show on: na.      Patient did not reschedule.   Reschedule date:  na

## 2022-04-05 NOTE — TELEPHONE ENCOUNTER
Patient was contacted and reschedule for 7/21/22 w/Dr Roberto Villasenor  Patient also stated she hasn't got her CPAP from [facility] and will contact her Ins to look for another DME,and will call with the name to send orders.

## 2022-04-11 ENCOUNTER — TELEPHONE (OUTPATIENT)
Dept: PULMONOLOGY | Age: 47
End: 2022-04-11

## 2022-04-11 DIAGNOSIS — J44.1 COPD EXACERBATION (HCC): Primary | ICD-10-CM

## 2022-04-11 RX ORDER — PREDNISONE 20 MG/1
20 TABLET ORAL 2 TIMES DAILY
Qty: 10 TABLET | Refills: 0 | Status: SHIPPED | OUTPATIENT
Start: 2022-04-11 | End: 2022-04-12

## 2022-04-12 ENCOUNTER — TELEMEDICINE (OUTPATIENT)
Dept: FAMILY MEDICINE CLINIC | Age: 47
End: 2022-04-12
Payer: MEDICARE

## 2022-04-12 DIAGNOSIS — J44.1 COPD EXACERBATION (HCC): Primary | ICD-10-CM

## 2022-04-12 PROCEDURE — 98966 PH1 ASSMT&MGMT NQHP 5-10: CPT

## 2022-04-12 RX ORDER — AZITHROMYCIN 250 MG/1
250 TABLET, FILM COATED ORAL SEE ADMIN INSTRUCTIONS
Qty: 6 TABLET | Refills: 0 | Status: SHIPPED | OUTPATIENT
Start: 2022-04-12 | End: 2022-04-17

## 2022-04-12 RX ORDER — PREDNISONE 20 MG/1
20 TABLET ORAL 2 TIMES DAILY
Qty: 10 TABLET | Refills: 0 | Status: SHIPPED | OUTPATIENT
Start: 2022-04-12 | End: 2022-06-24 | Stop reason: SDUPTHER

## 2022-04-12 NOTE — PROGRESS NOTES
Garth Lo is a 41 y.o. female evaluated via telephone on 4/12/2022. Consent:  She and/or health care decision maker is aware that that she may receive a bill for this telephone service, depending on her insurance coverage, and has provided verbal consent to proceed: Yes      Documentation:  I communicated with the patient and/or health care decision maker about current medical symptoms and concerns. Details of this discussion including any medical advice provided:     x4 days yellow productive cough, facial swelling/pressure, chest tightness/wheezing, SOB, sort throat. Cough is pretty frequent and forceful. Shortness of breath, wheezing, chest tightness more noticeable than normal given her history of COPD. Denies fever. Denies any white patches to back of throat. Is OTC meds for sore throat. Is using her albuterol, Symbicort, and ventolin which is helping with SOB,wheezing, chest tightness but not really fully taking away. Patient coughing frequently during conversation today. Patient also sounds like she is taking deeper breaths to catch her breath. I affirm this is a Patient Initiated Episode with an Established Patient who has not had a related appointment within my department in the past 7 days or scheduled within the next 24 hours. Total Time: minutes: 5-10 minutes    1. COPD exacerbation ([address]  Patient evaluated through telephone call today. See above HPI for symptoms and onset. Given the patient's symptoms and how she sounded during conversation-I do believe the patient is suffering from a mild COPD exacerbation. Patient still having chest tightness, shortness of breath, wheezing after using her breathing treatments inhalers at home. Significant frequent/forceful cough during conversation today. I will treat the patient with Zithromax and prednisone at this time. Patient was advised to follow back up with office if symptoms fail to improve or worsen.   If symptoms do not improve or worsen chest x-ray will be ordered. Patient verbalized understanding.  - azithromycin (ZITHROMAX) 250 MG tablet; Take 1 tablet by mouth See Admin Instructions for 5 days 500mg on day 1 followed by 250mg on days 2 - 5  Dispense: 6 tablet; Refill: 0  - predniSONE (DELTASONE) 20 MG tablet; Take 1 tablet by mouth 2 times daily for 5 days  Dispense: 10 tablet; Refill: 0        Note: not billable if this call serves to triage the patient into an appointment for the relevant concern    This document was prepared by a combination of typing and transcription through a voice recognition software.     BELA Mcneil - CNP      [place], [place]

## 2022-04-14 DIAGNOSIS — J44.1 COPD EXACERBATION (HCC): ICD-10-CM

## 2022-04-26 ENCOUNTER — TELEPHONE (OUTPATIENT)
Dept: PULMONOLOGY | Age: 47
End: 2022-04-26

## 2022-04-26 NOTE — TELEPHONE ENCOUNTER
Patient called stating that she has never received her CPAP from [address]. She mentioned that she might want to switch companies at this point. I called [address] today and they said that the patient's order has . They will need new orders faxed to them. I attempted to call patient to see if she wants order faxed to [facility] again or if she would like them sent somewhere else but no answer and her voicemail is full.

## 2022-04-27 ENCOUNTER — TELEPHONE (OUTPATIENT)
Dept: FAMILY MEDICINE CLINIC | Age: 47
End: 2022-04-27

## 2022-04-27 NOTE — TELEPHONE ENCOUNTER
----- Message from Lon Oliver sent at 4/27/2022  9:22 AM EDT -----  Subject: Message to Provider    QUESTIONS  Information for Provider? Patient is calling and asking for the numbers   off of her ID. She misplaced it. Please call patient to advise.   ---------------------------------------------------------------------------  --------------  CALL BACK INFO  What is the best way for the office to contact you? OK to leave message on   voicemail  Preferred Call Back Phone Number? [phone]  ---------------------------------------------------------------------------  --------------  SCRIPT ANSWERS  Relationship to Patient?  Self

## 2022-05-16 RX ORDER — FLUTICASONE PROPIONATE 50 MCG
1 SPRAY, SUSPENSION (ML) NASAL DAILY
Qty: 16 G | Refills: 5 | Status: SHIPPED | OUTPATIENT
Start: 2022-05-16 | End: 2022-09-07 | Stop reason: SDUPTHER

## 2022-05-31 RX ORDER — CLONAZEPAM 0.5 MG/1
0.5 TABLET ORAL 2 TIMES DAILY PRN
Qty: 60 TABLET | Refills: 0 | OUTPATIENT
Start: 2022-05-31

## 2022-05-31 RX ORDER — ESCITALOPRAM OXALATE 10 MG/1
10 TABLET ORAL DAILY
Qty: 90 TABLET | Refills: 1 | OUTPATIENT
Start: 2022-05-31

## 2022-05-31 RX ORDER — BUDESONIDE AND FORMOTEROL FUMARATE DIHYDRATE 160; 4.5 UG/1; UG/1
2 AEROSOL RESPIRATORY (INHALATION) 2 TIMES DAILY
Qty: 30.6 G | Refills: 1 | Status: SHIPPED | OUTPATIENT
Start: 2022-05-31 | End: 2022-07-28 | Stop reason: SDUPTHER

## 2022-05-31 RX ORDER — ALBUTEROL SULFATE 90 UG/1
2 AEROSOL, METERED RESPIRATORY (INHALATION) EVERY 6 HOURS PRN
Qty: 18 G | Refills: 3 | Status: SHIPPED | OUTPATIENT
Start: 2022-05-31 | End: 2022-06-07

## 2022-05-31 RX ORDER — CYCLOBENZAPRINE HCL 5 MG
5 TABLET ORAL 2 TIMES DAILY PRN
Qty: 10 TABLET | Refills: 0 | OUTPATIENT
Start: 2022-05-31 | End: 2022-06-10

## 2022-05-31 NOTE — TELEPHONE ENCOUNTER
Pt says that she is in the middle of moving so no permanent address so psych will not allow her schedule until she has an address.

## 2022-05-31 NOTE — TELEPHONE ENCOUNTER
This is not the dosage we have on lexapro. meds prescribed by psych. I see the note, but not approp for me to prescribe controlled substances, as she is seeing other providers for these meds.

## 2022-06-06 ENCOUNTER — TELEPHONE (OUTPATIENT)
Dept: FAMILY MEDICINE CLINIC | Age: 47
End: 2022-06-06

## 2022-06-06 DIAGNOSIS — E87.6 LOW SERUM POTASSIUM: Primary | ICD-10-CM

## 2022-06-06 NOTE — TELEPHONE ENCOUNTER
Pt was at [facility] on Sat and had to leave against doctor's orders b/c of her transportation. She is wanting her results.

## 2022-06-06 NOTE — TELEPHONE ENCOUNTER
Correction. Potassium was low. Appears was given rx for potassium supplement. Rec rpt bmp end of the week.

## 2022-06-07 RX ORDER — ALBUTEROL SULFATE 90 UG/1
2 AEROSOL, METERED RESPIRATORY (INHALATION) EVERY 6 HOURS PRN
Qty: 18 G | Refills: 3 | Status: SHIPPED | OUTPATIENT
Start: 2022-06-07 | End: 2022-09-07 | Stop reason: SDUPTHER

## 2022-06-09 ENCOUNTER — OFFICE VISIT (OUTPATIENT)
Dept: FAMILY MEDICINE CLINIC | Age: 47
End: 2022-06-09
Payer: MEDICARE

## 2022-06-09 ENCOUNTER — NURSE ONLY (OUTPATIENT)
Dept: FAMILY MEDICINE CLINIC | Age: 47
End: 2022-06-09
Payer: MEDICARE

## 2022-06-09 VITALS
SYSTOLIC BLOOD PRESSURE: 98 MMHG | HEART RATE: 89 BPM | WEIGHT: 201 LBS | OXYGEN SATURATION: 96 % | DIASTOLIC BLOOD PRESSURE: 60 MMHG | BODY MASS INDEX: 34.5 KG/M2

## 2022-06-09 DIAGNOSIS — K21.9 CHRONIC GERD: ICD-10-CM

## 2022-06-09 DIAGNOSIS — E87.6 LOW SERUM POTASSIUM: ICD-10-CM

## 2022-06-09 DIAGNOSIS — A63.0 GENITAL WARTS: Primary | ICD-10-CM

## 2022-06-09 DIAGNOSIS — M51.36 DDD (DEGENERATIVE DISC DISEASE), LUMBAR: ICD-10-CM

## 2022-06-09 PROCEDURE — 36415 COLL VENOUS BLD VENIPUNCTURE: CPT | Performed by: FAMILY MEDICINE

## 2022-06-09 PROCEDURE — G8417 CALC BMI ABV UP PARAM F/U: HCPCS

## 2022-06-09 PROCEDURE — G8427 DOCREV CUR MEDS BY ELIG CLIN: HCPCS

## 2022-06-09 PROCEDURE — 99213 OFFICE O/P EST LOW 20 MIN: CPT

## 2022-06-09 PROCEDURE — 1036F TOBACCO NON-USER: CPT

## 2022-06-09 RX ORDER — OMEPRAZOLE 40 MG/1
40 CAPSULE, DELAYED RELEASE ORAL 2 TIMES DAILY
Qty: 180 CAPSULE | Refills: 3 | Status: CANCELLED | OUTPATIENT
Start: 2022-06-09

## 2022-06-09 RX ORDER — ONDANSETRON 4 MG/1
4 TABLET, FILM COATED ORAL 3 TIMES DAILY PRN
Qty: 30 TABLET | Refills: 0 | Status: SHIPPED | OUTPATIENT
Start: 2022-06-09 | End: 2022-06-24

## 2022-06-09 RX ORDER — PROMETHAZINE HYDROCHLORIDE 25 MG/1
25 TABLET ORAL EVERY 6 HOURS PRN
Qty: 30 TABLET | Refills: 0 | Status: CANCELLED | OUTPATIENT
Start: 2022-06-09

## 2022-06-09 RX ORDER — IMIQUIMOD 12.5 MG/.25G
CREAM TOPICAL
Qty: 24 EACH | Refills: 0 | Status: SHIPPED | OUTPATIENT
Start: 2022-06-09

## 2022-06-09 RX ORDER — FUROSEMIDE 40 MG/1
40 TABLET ORAL DAILY
Qty: 30 TABLET | Refills: 5 | Status: CANCELLED | OUTPATIENT
Start: 2022-06-09

## 2022-06-09 RX ORDER — IMIQUIMOD 12.5 MG/.25G
CREAM TOPICAL
COMMUNITY
Start: 2022-04-13 | End: 2022-06-09 | Stop reason: SDUPTHER

## 2022-06-09 RX ORDER — PSEUDOEPHEDRINE HCL 30 MG
100 TABLET ORAL 2 TIMES DAILY PRN
Qty: 60 CAPSULE | Refills: 5 | Status: CANCELLED | OUTPATIENT
Start: 2022-06-09

## 2022-06-09 RX ORDER — ATORVASTATIN CALCIUM 10 MG/1
10 TABLET, FILM COATED ORAL DAILY
Qty: 90 TABLET | Refills: 3 | Status: CANCELLED | OUTPATIENT
Start: 2022-06-09

## 2022-06-09 ASSESSMENT — PATIENT HEALTH QUESTIONNAIRE - PHQ9
4. FEELING TIRED OR HAVING LITTLE ENERGY: 0
9. THOUGHTS THAT YOU WOULD BE BETTER OFF DEAD, OR OF HURTING YOURSELF: 0
2. FEELING DOWN, DEPRESSED OR HOPELESS: 0
5. POOR APPETITE OR OVEREATING: 1
SUM OF ALL RESPONSES TO PHQ QUESTIONS 1-9: 2
SUM OF ALL RESPONSES TO PHQ QUESTIONS 1-9: 2
3. TROUBLE FALLING OR STAYING ASLEEP: 1
SUM OF ALL RESPONSES TO PHQ QUESTIONS 1-9: 2
7. TROUBLE CONCENTRATING ON THINGS, SUCH AS READING THE NEWSPAPER OR WATCHING TELEVISION: 0
8. MOVING OR SPEAKING SO SLOWLY THAT OTHER PEOPLE COULD HAVE NOTICED. OR THE OPPOSITE, BEING SO FIGETY OR RESTLESS THAT YOU HAVE BEEN MOVING AROUND A LOT MORE THAN USUAL: 0
6. FEELING BAD ABOUT YOURSELF - OR THAT YOU ARE A FAILURE OR HAVE LET YOURSELF OR YOUR FAMILY DOWN: 0
10. IF YOU CHECKED OFF ANY PROBLEMS, HOW DIFFICULT HAVE THESE PROBLEMS MADE IT FOR YOU TO DO YOUR WORK, TAKE CARE OF THINGS AT HOME, OR GET ALONG WITH OTHER PEOPLE: 0
SUM OF ALL RESPONSES TO PHQ QUESTIONS 1-9: 2

## 2022-06-09 ASSESSMENT — ENCOUNTER SYMPTOMS
GASTROINTESTINAL NEGATIVE: 1
RESPIRATORY NEGATIVE: 1

## 2022-06-09 NOTE — PROGRESS NOTES
Blood drawn per order. Needle size: 21 g  Site: R Antecubital.  First attempt successful Yes    Second attempt no    Pressure applied until bleeding stopped. Pressure applied. Patient informed to call office or return if bleeding reoccurs and unable to stop.     Tubes drawn: 0 purple     1 red

## 2022-06-09 NOTE — PROGRESS NOTES
Chief Complaint   Patient presents with    Genital Warts       HPI:  Francine Guillory is a 41 y.o. (: 1975) here today   for genital warts. Started x4 months ago. Was seen by OBGYN last for this who gave pt medication which improved pt symptoms but they never fully went away. Are draining, itching and burning. Temp 99.2F today. Wants new ref for pain clinic closer to home. Wants new GI specialist ref for her chronic GERD. Patient's medications, allergies, past medical, surgical, social and family histories were reviewed and updated asappropriate. ROS:  Review of Systems   Constitutional: Negative. HENT: Negative. Respiratory: Negative. Cardiovascular: Negative. Gastrointestinal: Negative. Genitourinary:        Genital warts             Prior to Visit Medications    Medication Sig Taking?  Authorizing Provider   imiquimod (ALDARA) 5 % cream APPLY 1 PACKET TOPICALLY TO AFFECTED AREA ON BODY THREE TIMES WEEKLY AT BEDTIME AND LEAVE ON FOR 8 HOURS, THEN [address] OFF AS DIRECTED BY PRESCRIBER Yes BELA Corral CNP   ondansetron (ZOFRAN) 4 MG tablet Take 1 tablet by mouth 3 times daily as needed for Nausea or Vomiting Yes BELA Corral CNP   albuterol sulfate HFA (PROVENTIL;VENTOLIN;PROAIR) 108 (90 Base) MCG/ACT inhaler INHALE 2 PUFFS INTO THE LUNGS EVERY 6 HOURS AS NEEDED FOR SHORTNESS OF BREATH Yes Rizwan Degroot MD   budesonide-formoterol (SYMBICORT) 160-4.5 MCG/ACT AERO Inhale 2 puffs into the lungs 2 times daily Yes Rizwan Degroot MD   fluticasone (FLONASE) 50 MCG/ACT nasal spray 1 SPRAY BY EACH NOSTRIL ROUTE DAILY Yes Rizwan Degroot MD   tiotropium (SPIRIVA RESPIMAT) 2.5 MCG/ACT AERS inhaler INHALE 2 PUFFS DAILY Yes Yadi Umana MD   albuterol (PROVENTIL) (2.5 MG/3ML) 0.083% nebulizer solution Take 3 mLs by nebulization every 4 hours as needed for Wheezing Yes Rizwan Degroot MD   promethazine (PHENERGAN) 25 MG tablet Take 25 mg by mouth every 6 hours as needed for Nausea Yes Historical Provider, MD   pantoprazole sodium (PROTONIX) 40 MG PACK packet Take 40 mg by mouth daily Yes Historical Provider, MD   metroNIDAZOLE (FLAGYL) 500 MG tablet Take 500 mg by mouth 3 times daily Yes Historical Provider, MD   HYDROcodone-acetaminophen (NORCO) 7.5-325 MG per tablet Take 1 tablet by mouth every 6 hours as needed. Yes Historical Provider, MD   lamoTRIgine (LAMICTAL) 25 MG tablet Take 2 tablets by mouth daily Yes Mai Cantor MD   furosemide (LASIX) 40 MG tablet Take 1 tablet by mouth daily Yes Mai Cantor MD   linaclotide [facility]) 290 MCG CAPS capsule Take 1 capsule by mouth every morning (before breakfast) Yes Mai Cantor MD   docusate (COLACE, DULCOLAX) 100 MG CAPS Take 100 mg by mouth 2 times daily as needed (.) Yes Mai Cantor MD   omeprazole (PRILOSEC) 40 MG delayed release capsule Take 1 capsule by mouth 2 times daily Yes Mai Cantor MD   atorvastatin (LIPITOR) 10 MG tablet Take 1 tablet by mouth daily Yes Mai Cantor MD   levocetirizine (XYZAL) 5 MG tablet Take 1 tablet by mouth nightly Yes Mai Cantor MD   carvedilol (COREG) 6.25 MG tablet Take 1 tablet by mouth 2 times daily Yes Mai Cantor MD   aspirin 81 MG EC tablet Take 1 tablet by mouth daily Yes Mai Cantor MD   gabapentin (NEURONTIN) 300 MG capsule Take 600 mg by mouth 3 times daily.   Yes Historical Provider, MD   HYDROXYZINE HCL PO Take 50 mg by mouth in the morning, at noon, and at bedtime  Yes Historical Provider, MD   QUEtiapine (SEROQUEL) 25 MG tablet Take 25 mg by mouth daily as needed  Yes Historical Provider, MD   OXYGEN Inhale 2 L into the lungs  Yes Historical Provider, MD   paliperidone (INVEGA) 9 MG extended release tablet Take 9 mg by mouth every morning Yes Historical Provider, MD   escitalopram (LEXAPRO) 20 MG tablet Take 20 mg by mouth daily Yes Historical Provider, MD       Allergies   Allergen Reactions    Clindamycin/Lincomycin Other (See Comments), Hives, Itching and Shortness Of Breath     Weakness, and dizziness  Eye and throat swelling       Penicillins Swelling and Other (See Comments)     Other reaction(s): anaphylaxis/angioedema, anaphylaxis/angioedema  Throat swelling  Eye and throat swelling   Eye and throat swelling      Amoxicillin     Citalopram Hydrobromide      She thinks it made her bp to elevate    Clavulanic Acid Nausea And Vomiting       OBJECTIVE:    BP 98/60   Pulse 89   Wt 201 lb (91.2 kg)   LMP 12/18/2013   SpO2 96%   BMI 34.50 kg/m²     BP Readings from Last 2 Encounters:   06/09/22 98/60   12/27/21 (!) 135/92       Wt Readings from Last 3 Encounters:   06/09/22 201 lb (91.2 kg)   12/27/21 210 lb (95.3 kg)   08/30/21 212 lb 6.4 oz (96.3 kg)       Physical Exam  Constitutional:       Appearance: Normal appearance. Cardiovascular:      Rate and Rhythm: Normal rate and regular rhythm. Pulses: Normal pulses. Heart sounds: Normal heart sounds. No murmur heard. Pulmonary:      Effort: Pulmonary effort is normal.      Breath sounds: Normal breath sounds. Abdominal:      General: Bowel sounds are normal.   Genitourinary:     Comments: Genital warts    Musculoskeletal:         General: Normal range of motion. Skin:     General: Skin is warm and dry. Neurological:      Mental Status: She is alert. Psychiatric:         Mood and Affect: Mood normal.         Behavior: Behavior normal.           ASSESSMENT/PLAN:    1. Genital warts  Patient seen office today for complaints of genital warts. Not a new issue for this patient as listed above in the HPI, see above. I had my MA Temitope Israel present in the room with me during physical exam today at which time I examined the patient's vaginal and rectal area for genital warts. Patient did have noted genital warts in the vaginal area that extended to the rectal area. There is no visible drainage at this time.   Patient was given new prescription for cream to use for genital warts, see below. Patient was advised to follow back up with the office if symptoms fail to improve or worsen. I discussed with the patient if symptoms fail to improve or worsen we may need to consult general surgery for surgical removal.  Patient verbalized understanding.  - imiquimod (ALDARA) 5 % cream; APPLY 1 PACKET TOPICALLY TO AFFECTED AREA ON BODY THREE TIMES WEEKLY AT BEDTIME AND LEAVE ON FOR 8 HOURS, THEN [address] OFF AS DIRECTED BY PRESCRIBER  Dispense: 24 each; Refill: 0    2. DDD (degenerative disc disease), lumbar  Patient given new referral as requested for pain management, see below  - External Referral To Pain Clinic    3. Chronic GERD  Patient given new GI specialist for eval requested, see below  - [facility] - Marcus Rodriguez DO, Gastroenterology, [facility]  - ondansetron (ZOFRAN) 4 MG tablet; Take 1 tablet by mouth 3 times daily as needed for Nausea or Vomiting  Dispense: 30 tablet; Refill: 0    20 minutes spent on patient care today    This document was prepared by a combination of typing and transcription through a voice recognition software.     Alisson George, APRN - CNP

## 2022-06-10 LAB
ANION GAP SERPL CALCULATED.3IONS-SCNC: 14 MMOL/L (ref 3–16)
BUN BLDV-MCNC: 11 MG/DL (ref 7–20)
CALCIUM SERPL-MCNC: 9.5 MG/DL (ref 8.3–10.6)
CHLORIDE BLD-SCNC: 103 MMOL/L (ref 99–110)
CO2: 23 MMOL/L (ref 21–32)
CREAT SERPL-MCNC: 0.9 MG/DL (ref 0.6–1.1)
GFR AFRICAN AMERICAN: >60
GFR NON-AFRICAN AMERICAN: >60
GLUCOSE BLD-MCNC: 98 MG/DL (ref 70–99)
POTASSIUM SERPL-SCNC: 4.5 MMOL/L (ref 3.5–5.1)
SODIUM BLD-SCNC: 140 MMOL/L (ref 136–145)

## 2022-06-13 ENCOUNTER — TELEPHONE (OUTPATIENT)
Dept: FAMILY MEDICINE CLINIC | Age: 47
End: 2022-06-13

## 2022-06-13 NOTE — TELEPHONE ENCOUNTER
Patient call about her CPAP order,patient was explain we try to contact several times before on regard where she want us to send orders, she didn't have new DME name for us to send order,pt will contact her ins to let her know witch one she can use.

## 2022-06-13 NOTE — TELEPHONE ENCOUNTER
Pt called. Dr HAN called in RX for portable oxygen to [place] many months ago so she had to change providers to [facility] and they need the order sent to them. Fax to [phone].

## 2022-06-13 NOTE — TELEPHONE ENCOUNTER
If the patient has switched companies for medication supplies please fax orders for her current needs to the appropriate location.

## 2022-06-14 ENCOUNTER — TELEPHONE (OUTPATIENT)
Dept: FAMILY MEDICINE CLINIC | Age: 47
End: 2022-06-14

## 2022-06-14 NOTE — TELEPHONE ENCOUNTER
Notes that we sent [facility] for the oxygen do not qualify her. She needs a 6 minute walk test here in the office. COPD cannot be used as the diagnosis either, has to be a resolved issue. [facility] has to set her up with the concentrator as well per [facility]. I called and left a message on pt's phone asking her to call the office for an appt. FYI    Addendum:  J44.1 COPD Exacerbation cannot be used. Has to be a chronic not acute issue. My misunderstanding. Needs to mention in notes that it is resolved and needs liter flow as well. Pt has an appt for tomorrow for the 6 minute walk test.   Please send the OV notes to [facility] tomorrow.

## 2022-06-14 NOTE — TELEPHONE ENCOUNTER
Whenever hoops we need to jump through we will need to ensure we complete. COPD cannot be a resolved issue. If the patient is a 6-minute walk test we want to bring her back in office.

## 2022-06-15 DIAGNOSIS — R06.02 SOB (SHORTNESS OF BREATH): Primary | ICD-10-CM

## 2022-06-21 ENCOUNTER — TELEPHONE (OUTPATIENT)
Dept: FAMILY MEDICINE CLINIC | Age: 47
End: 2022-06-21

## 2022-06-21 DIAGNOSIS — J44.9 CHRONIC OBSTRUCTIVE PULMONARY DISEASE, UNSPECIFIED COPD TYPE (HCC): ICD-10-CM

## 2022-06-21 DIAGNOSIS — I10 ESSENTIAL HYPERTENSION: ICD-10-CM

## 2022-06-21 DIAGNOSIS — R06.02 SOB (SHORTNESS OF BREATH): Primary | ICD-10-CM

## 2022-06-21 DIAGNOSIS — G47.33 OSA (OBSTRUCTIVE SLEEP APNEA): ICD-10-CM

## 2022-06-21 DIAGNOSIS — J44.1 COPD EXACERBATION (HCC): ICD-10-CM

## 2022-06-21 NOTE — TELEPHONE ENCOUNTER
Pt needs a new cardiologist and pulmonologist closer to her. [place] or [place]. Her old specialists are in [place] and are too far away. Pls call her back with specialist names and phone numbers so she can follow up on the appointments.

## 2022-06-21 NOTE — TELEPHONE ENCOUNTER
Per note in chart, last pain mgmt gave her a prescription for #90 percocet on 6/8, and then forward dated another rx for #90 to be filled on 7/8.   Should not need additional med prior to pain mgmt appt

## 2022-06-21 NOTE — TELEPHONE ENCOUNTER
Dr. Anna Rivera at [facility] for [employer] [facility] at [facility]. Place orders and I will cosign. Thanks.

## 2022-06-21 NOTE — TELEPHONE ENCOUNTER
New Pain Mgmt doc cannot see the patient until the end of July. They haven't scheduled her yet b/c they are verifying insurance coverage. They advised patient to call you and see if you would manage pain meds until appt. Pls advise.

## 2022-06-24 ENCOUNTER — OFFICE VISIT (OUTPATIENT)
Dept: FAMILY MEDICINE CLINIC | Age: 47
End: 2022-06-24
Payer: MEDICARE

## 2022-06-24 VITALS
HEIGHT: 64 IN | WEIGHT: 202.4 LBS | BODY MASS INDEX: 34.56 KG/M2 | OXYGEN SATURATION: 98 % | HEART RATE: 90 BPM | DIASTOLIC BLOOD PRESSURE: 74 MMHG | SYSTOLIC BLOOD PRESSURE: 122 MMHG

## 2022-06-24 DIAGNOSIS — G47.33 OSA (OBSTRUCTIVE SLEEP APNEA): ICD-10-CM

## 2022-06-24 DIAGNOSIS — J44.1 COPD EXACERBATION (HCC): ICD-10-CM

## 2022-06-24 DIAGNOSIS — J01.90 ACUTE BACTERIAL SINUSITIS: Primary | ICD-10-CM

## 2022-06-24 DIAGNOSIS — B96.89 ACUTE BACTERIAL SINUSITIS: Primary | ICD-10-CM

## 2022-06-24 DIAGNOSIS — H92.03 OTALGIA OF BOTH EARS: ICD-10-CM

## 2022-06-24 PROCEDURE — 99214 OFFICE O/P EST MOD 30 MIN: CPT | Performed by: FAMILY MEDICINE

## 2022-06-24 PROCEDURE — G8417 CALC BMI ABV UP PARAM F/U: HCPCS | Performed by: FAMILY MEDICINE

## 2022-06-24 PROCEDURE — 3023F SPIROM DOC REV: CPT | Performed by: FAMILY MEDICINE

## 2022-06-24 PROCEDURE — G8427 DOCREV CUR MEDS BY ELIG CLIN: HCPCS | Performed by: FAMILY MEDICINE

## 2022-06-24 PROCEDURE — 1036F TOBACCO NON-USER: CPT | Performed by: FAMILY MEDICINE

## 2022-06-24 RX ORDER — PREDNISONE 20 MG/1
20 TABLET ORAL 2 TIMES DAILY
Qty: 10 TABLET | Refills: 0 | Status: SHIPPED | OUTPATIENT
Start: 2022-06-24 | End: 2022-06-29

## 2022-06-24 RX ORDER — CEFDINIR 300 MG/1
300 CAPSULE ORAL 2 TIMES DAILY
Qty: 20 CAPSULE | Refills: 0 | Status: SHIPPED | OUTPATIENT
Start: 2022-06-24 | End: 2022-10-21 | Stop reason: SDUPTHER

## 2022-06-24 ASSESSMENT — ENCOUNTER SYMPTOMS
WHEEZING: 1
COUGH: 1
SINUS PRESSURE: 1

## 2022-06-24 NOTE — PROGRESS NOTES
Chief Complaint   Patient presents with    Ear Problem    Headache       HPI:  Dorinda Burt is a 41 y.o. (: 1975) here today   for ear pain and headache. HPI  sxs began on left ear. Felt as if something in left ear, then pressure on both sides. Began earlier this week. Has not tried any otc options. Still on xyzal and flonase. No fevers. Head pressure noted. Has had some nasal congestion and runny nose. Exposure to other w/ tonsil and ear issues. In the process of trying to get cpap. Patient's medications, allergies, past medical, surgical, social and family histories were reviewed and updated as appropriate. ROS:  Review of Systems   Constitutional: Negative for fever. HENT: Positive for congestion, ear pain (no discharge, but feels as if fluid. ) and sinus pressure. Respiratory: Positive for cough (sputum production) and wheezing. Neurological: Positive for headaches. Prior to Visit Medications    Medication Sig Taking?  Authorizing Provider   cefdinir (OMNICEF) 300 MG capsule Take 1 capsule by mouth 2 times daily for 10 days Yes Richmond Pineda MD   predniSONE (DELTASONE) 20 MG tablet Take 1 tablet by mouth 2 times daily for 5 days Yes Richmond Pineda MD   imiquimod (ALDARA) 5 % cream APPLY 1 PACKET TOPICALLY TO AFFECTED AREA ON BODY THREE TIMES WEEKLY AT BEDTIME AND LEAVE ON FOR 8 HOURS, THEN [address] OFF AS DIRECTED BY PRESCRIBER Yes BELA Mancuso CNP   ondansetron (ZOFRAN) 4 MG tablet Take 1 tablet by mouth 3 times daily as needed for Nausea or Vomiting Yes BELA Mancuso CNP   albuterol sulfate HFA (PROVENTIL;VENTOLIN;PROAIR) 108 (90 Base) MCG/ACT inhaler INHALE 2 PUFFS INTO THE LUNGS EVERY 6 HOURS AS NEEDED FOR SHORTNESS OF BREATH Yes Richmond Pineda MD   budesonide-formoterol (SYMBICORT) 160-4.5 MCG/ACT AERO Inhale 2 puffs into the lungs 2 times daily Yes Richmond Pineda MD   fluticasone (FLONASE) 50 MCG/ACT nasal spray 1 SPRAY BY [facility] NOSTRIL ROUTE DAILY Yes Morgan Varner MD   tiotropium (SPIRIVA RESPIMAT) 2.5 MCG/ACT AERS inhaler INHALE 2 PUFFS DAILY Yes Dejan Chong MD   albuterol (PROVENTIL) (2.5 MG/3ML) 0.083% nebulizer solution Take 3 mLs by nebulization every 4 hours as needed for Wheezing Yes Morgan Varner MD   promethazine (PHENERGAN) 25 MG tablet Take 25 mg by mouth every 6 hours as needed for Nausea Yes Historical Provider, MD   pantoprazole sodium (PROTONIX) 40 MG PACK packet Take 40 mg by mouth daily Yes Historical Provider, MD   metroNIDAZOLE (FLAGYL) 500 MG tablet Take 500 mg by mouth 3 times daily Yes Historical Provider, MD   HYDROcodone-acetaminophen (NORCO) 7.5-325 MG per tablet Take 1 tablet by mouth every 6 hours as needed. Yes Historical Provider, MD   lamoTRIgine (LAMICTAL) 25 MG tablet Take 2 tablets by mouth daily Yes Morgan Varner MD   furosemide (LASIX) 40 MG tablet Take 1 tablet by mouth daily Yes Morgan Varner MD   linaclotide [facility]) 290 MCG CAPS capsule Take 1 capsule by mouth every morning (before breakfast) Yes Morgan Varner MD   docusate (COLACE, DULCOLAX) 100 MG CAPS Take 100 mg by mouth 2 times daily as needed (.) Yes Morgan Varner MD   omeprazole (PRILOSEC) 40 MG delayed release capsule Take 1 capsule by mouth 2 times daily Yes Morgan Varner MD   atorvastatin (LIPITOR) 10 MG tablet Take 1 tablet by mouth daily Yes Morgan Varner MD   levocetirizine (XYZAL) 5 MG tablet Take 1 tablet by mouth nightly Yes Morgan Varner MD   carvedilol (COREG) 6.25 MG tablet Take 1 tablet by mouth 2 times daily Yes Morgan Varner MD   aspirin 81 MG EC tablet Take 1 tablet by mouth daily Yes Morgan Varner MD   gabapentin (NEURONTIN) 300 MG capsule Take 600 mg by mouth 3 times daily.   Yes Historical Provider, MD   HYDROXYZINE HCL PO Take 50 mg by mouth in the morning, at noon, and at bedtime  Yes Historical Provider, MD   QUEtiapine (SEROQUEL) 25 MG tablet Take 25 mg by mouth daily as needed  Yes Historical Provider, MD   OXYGEN Inhale 2 L into the lungs  Yes Historical Provider, MD   paliperidone (INVEGA) 9 MG extended release tablet Take 9 mg by mouth every morning Yes Historical Provider, MD   escitalopram (LEXAPRO) 20 MG tablet Take 20 mg by mouth daily Yes Historical Provider, MD       Allergies   Allergen Reactions    Clindamycin/Lincomycin Other (See Comments), Hives, Itching and Shortness Of Breath     Weakness, and dizziness  Eye and throat swelling       Penicillins Swelling and Other (See Comments)     Other reaction(s): anaphylaxis/angioedema, anaphylaxis/angioedema  Throat swelling  Eye and throat swelling   Eye and throat swelling      Amoxicillin     Citalopram Hydrobromide      She thinks it made her bp to elevate    Clavulanic Acid Nausea And Vomiting       OBJECTIVE:    /74   Pulse 90   Ht 5' 4\" (1.626 m)   Wt 202 lb 6.4 oz (91.8 kg)   LMP 12/18/2013   SpO2 98%   BMI 34.74 kg/m²     BP Readings from Last 2 Encounters:   06/24/22 122/74   06/09/22 98/60       Wt Readings from Last 3 Encounters:   06/24/22 202 lb 6.4 oz (91.8 kg)   06/09/22 201 lb (91.2 kg)   12/27/21 210 lb (95.3 kg)       Physical Exam  Constitutional:       Appearance: She is well-developed. HENT:      Head: Normocephalic and atraumatic. Right Ear: Tympanic membrane and external ear normal.      Left Ear: External ear normal. A middle ear effusion is present. Nose:      Right Sinus: No maxillary sinus tenderness or frontal sinus tenderness. Left Sinus: Maxillary sinus tenderness and frontal sinus tenderness present. Eyes:      Conjunctiva/sclera: Conjunctivae normal.   Neck:      Trachea: No tracheal deviation. Cardiovascular:      Rate and Rhythm: Normal rate and regular rhythm. Heart sounds: Normal heart sounds. Pulmonary:      Effort: Pulmonary effort is normal.      Breath sounds: Decreased breath sounds and wheezing present. Abdominal:      Palpations: Abdomen is soft. Tenderness: There is no abdominal tenderness. Skin:     General: Skin is warm and dry. Neurological:      Mental Status: She is alert and oriented to person, place, and time. Psychiatric:         Mood and Affect: Mood is anxious. Behavior: Behavior normal.           ASSESSMENT/PLAN:     1. Acute bacterial sinusitis  Wonder about sinus infection. Ears do not look too bad. Discussed over-the-counter eardrops. Continue inhalers, nasal spray, allergy medication. Given tenderness over the sinuses and duration of symptoms, antibiotics as below. Also steroid as below given wheeze. Call if symptoms fail to improve  - cefdinir (OMNICEF) 300 MG capsule; Take 1 capsule by mouth 2 times daily for 10 days  Dispense: 20 capsule; Refill: 0  - predniSONE (DELTASONE) 20 MG tablet; Take 1 tablet by mouth 2 times daily for 5 days  Dispense: 10 tablet; Refill: 0    2. Otalgia of both ears  See above  - cefdinir (OMNICEF) 300 MG capsule; Take 1 capsule by mouth 2 times daily for 10 days  Dispense: 20 capsule; Refill: 0    3. COPD exacerbation ([facility] [address]  See above  - predniSONE (DELTASONE) 20 MG tablet; Take 1 tablet by mouth 2 times daily for 5 days  Dispense: 10 tablet; Refill: 0    4. Obstructive sleep apnea  Patient in the process of try to get CPAP. This may help with headaches, breathing issues, and fatigue    Patient did ask regarding her prior EKG. There was some nonspecific findings. It appears it was done in the ER. She was hypokalemic at that time. Potassium has been replaced and has been normal since. No additional testing was done at this time      This document was prepared by a combination of typing and transcription through a voice recognition software.

## 2022-07-21 ENCOUNTER — TELEMEDICINE (OUTPATIENT)
Dept: FAMILY MEDICINE CLINIC | Age: 47
End: 2022-07-21
Payer: MEDICARE

## 2022-07-21 ENCOUNTER — TELEPHONE (OUTPATIENT)
Dept: FAMILY MEDICINE CLINIC | Age: 47
End: 2022-07-21

## 2022-07-21 DIAGNOSIS — L23.7 POISON IVY: Primary | ICD-10-CM

## 2022-07-21 PROCEDURE — 98966 PH1 ASSMT&MGMT NQHP 5-10: CPT

## 2022-07-21 RX ORDER — METHYLPREDNISOLONE 4 MG/1
TABLET ORAL
Qty: 1 KIT | Refills: 0 | Status: SHIPPED | OUTPATIENT
Start: 2022-07-21 | End: 2022-07-27

## 2022-07-21 RX ORDER — CYCLOBENZAPRINE HCL 10 MG
10 TABLET ORAL 3 TIMES DAILY PRN
COMMUNITY
End: 2022-08-25 | Stop reason: ALTCHOICE

## 2022-07-21 NOTE — TELEPHONE ENCOUNTER
Patient contacted me on the on-call service line before office was open today for complaints of scattered bodily rash. Patient was advised that she will be placed on my schedule for 1120 video visit today. Patient states she was unable to be seen in office due to transportation that we will manage to the best of her ability for the patient's symptoms.

## 2022-07-22 RX ORDER — FUROSEMIDE 40 MG/1
40 TABLET ORAL DAILY
Qty: 28 TABLET | Refills: 1 | Status: SHIPPED | OUTPATIENT
Start: 2022-07-22 | End: 2022-09-07 | Stop reason: SDUPTHER

## 2022-07-22 RX ORDER — DOCUSATE SODIUM 100 MG/1
CAPSULE, LIQUID FILLED ORAL
Qty: 60 CAPSULE | Refills: 5 | Status: SHIPPED | OUTPATIENT
Start: 2022-07-22 | End: 2022-09-07 | Stop reason: SDUPTHER

## 2022-07-22 RX ORDER — LINACLOTIDE 290 UG/1
CAPSULE, GELATIN COATED ORAL
Qty: 30 CAPSULE | Refills: 5 | Status: SHIPPED | OUTPATIENT
Start: 2022-07-22 | End: 2022-09-07 | Stop reason: SDUPTHER

## 2022-07-28 RX ORDER — BUDESONIDE AND FORMOTEROL FUMARATE DIHYDRATE 160; 4.5 UG/1; UG/1
2 AEROSOL RESPIRATORY (INHALATION) 2 TIMES DAILY
Qty: 6 EACH | Refills: 5 | Status: SHIPPED | OUTPATIENT
Start: 2022-07-28 | End: 2022-09-07 | Stop reason: SDUPTHER

## 2022-07-29 ENCOUNTER — CARE COORDINATION (OUTPATIENT)
Dept: CARE COORDINATION | Age: 47
End: 2022-07-29

## 2022-07-29 SDOH — ECONOMIC STABILITY: FOOD INSECURITY: WITHIN THE PAST 12 MONTHS, THE FOOD YOU BOUGHT JUST DIDN'T LAST AND YOU DIDN'T HAVE MONEY TO GET MORE.: NEVER TRUE

## 2022-07-29 SDOH — ECONOMIC STABILITY: FOOD INSECURITY: WITHIN THE PAST 12 MONTHS, YOU WORRIED THAT YOUR FOOD WOULD RUN OUT BEFORE YOU GOT MONEY TO BUY MORE.: NEVER TRUE

## 2022-07-29 SDOH — ECONOMIC STABILITY: TRANSPORTATION INSECURITY
IN THE PAST 12 MONTHS, HAS THE LACK OF TRANSPORTATION KEPT YOU FROM MEDICAL APPOINTMENTS OR FROM GETTING MEDICATIONS?: YES

## 2022-07-29 NOTE — CARE COORDINATION
Ambulatory Care Coordination Note  7/29/2022    ACC: Steffany Wright RN    Summary Note:       FYI:  Introduced role of ACM; patient agrees to f/u calls  Began CC assessment  Patient reports she is currently residing at a friend's home awaiting to move into her new place  Patient currently owes $100.80 toward her electric bill and the company will not turn it on in her new place until it is paid. Patient currently utilizes oxygen at night via her c-pap and PRN via concentrator during the day  Encouraged patient to contact her electric company regarding oxygen requirements and request a waiver; patient agrees to call them today  Will continue CC assessment/SDOH next f/u    Ambulatory Care Coordination Assessment    Care Coordination Protocol  Referral from Primary Care Provider: No  Week 1 - Initial Assessment     Do you have all of your prescriptions and are they filled?:  (Comment: reviewed 7/29/2022)  Are you able to afford your medications?: Yes     Do you have Home O2 Therapy?: Yes   Oxygen Regimen: PRN, At night/Sleep Flow - Enter rate/FIO2: 2   Method of Delivery: Concentrater, Nasal Cannula   CPAP Use: CPAP      Ability to drive and/or has transportation: Dependent  Ability to do shopping: Dependent     Current Housing: Other                 How would you rate their financial resources (including ability to afford all required medical care)?: Financially insecure, very few resources, immediate challenges   How wells does the patient now understand their health and well-being (symptoms, signs or risk factors) and what they need to do to manage their health?: Reasonable to good understanding and already engages in managing health or is willing to undertake better management   How well do you think your patient can engage in healthcare discussions?  (Barriers include language, deafness, aphasia, alcohol or drug problems, learning difficulties, concentration): Clear and open communication, no identified barriers   Do other services need to be involved to help this patient?: Other care/services in place but not sufficient   Suggested Interventions and [address]: Completed       Transportation Services: Completed                    Prior to Admission medications    Medication Sig Start Date End Date Taking? Authorizing Provider   Probiotic Product (DIGESTIVE ADVANTAGE PO) Take by mouth   Yes Historical Provider, MD   APPLE CIDER VINEGAR PO Take by mouth   Yes Historical Provider, MD   budesonide-formoterol (SYMBICORT) 160-4.5 MCG/ACT AERO Inhale 2 puffs into the lungs in the morning and 2 puffs before bedtime. 7/28/22 10/26/22 Yes Efraín Hargrove MD   clonazePAM (KLONOPIN) 1 MG tablet Take 1 mg by mouth 2 times daily as needed.    Yes Historical Provider, MD   LINZESS 290 MCG CAPS capsule TAKE 1 CAPSULE BY MOUTH DAILY ON A EMPTY STOMACH AT LEAST 30 MINUTES BEFORE 1ST MEAL 7/22/22  Yes BELA Acosta CNP   docusate sodium (COLACE) 100 MG capsule TAKE 1 CAPSULE BY MOUTH TWICE A DAY AS NEEDED 7/22/22  Yes BELA Acosta CNP   furosemide (LASIX) 40 MG tablet TAKE 1 TABLET BY MOUTH DAILY 7/22/22  Yes BELA Acosta CNP   cyclobenzaprine (FLEXERIL) 10 MG tablet Take 10 mg by mouth 3 times daily as needed for Muscle spasms   Yes Historical Provider, MD   imiquimod (ALDARA) 5 % cream APPLY 1 PACKET TOPICALLY TO AFFECTED AREA ON BODY THREE TIMES WEEKLY AT BEDTIME AND LEAVE ON FOR 8 HOURS, THEN [address] OFF AS DIRECTED BY PRESCRIBER 6/9/22  Yes BELA Acosta CNP   albuterol sulfate HFA (PROVENTIL;VENTOLIN;PROAIR) 108 (90 Base) MCG/ACT inhaler INHALE 2 PUFFS INTO THE LUNGS EVERY 6 HOURS AS NEEDED FOR SHORTNESS OF BREATH 6/7/22  Yes Efraín Hargrove MD   fluticasone [facility]) 50 MCG/ACT nasal spray 1 SPRAY BY EACH NOSTRIL ROUTE DAILY 5/16/22  Yes Efraín Hargrove MD   tiotropium (SPIRIVA RESPIMAT) 2.5 MCG/ACT AERS inhaler INHALE 2 PUFFS DAILY 4/15/22  Yes Ed De Leon Balbina Fuentes MD   albuterol (PROVENTIL) (2.5 MG/3ML) 0.083% nebulizer solution Take 3 mLs by nebulization every 4 hours as needed for Wheezing 2/23/22  Yes Cecilio Michele MD   promethazine (PHENERGAN) 25 MG tablet Take 25 mg by mouth every 6 hours as needed for Nausea   Yes Historical Provider, MD   pantoprazole sodium (PROTONIX) 40 MG PACK packet Take 40 mg by mouth daily   Yes Historical Provider, MD   HYDROcodone-acetaminophen (NORCO) 7.5-325 MG per tablet Take 1 tablet by mouth every 6 hours as needed. Yes Historical Provider, MD   lamoTRIgine (LAMICTAL) 25 MG tablet Take 2 tablets by mouth daily 12/30/21  Yes Cecilio Michele MD   omeprazole (PRILOSEC) 40 MG delayed release capsule Take 1 capsule by mouth 2 times daily 9/23/21  Yes Cecilio Michele MD   atorvastatin (LIPITOR) 10 MG tablet Take 1 tablet by mouth daily 9/23/21  Yes Cecilio Michele MD   levocetirizine [facility]) 5 MG tablet Take 1 tablet by mouth nightly 9/23/21  Yes Cecilio Michele MD   carvedilol (COREG) 6.25 MG tablet Take 1 tablet by mouth 2 times daily 9/23/21  Yes Cecilio Michele MD   aspirin 81 MG EC tablet Take 1 tablet by mouth daily 9/23/21  Yes Cecilio Michele MD   gabapentin (NEURONTIN) 300 MG capsule Take 600 mg by mouth 3 times daily.   2/25/21  Yes Historical Provider, MD   HYDROXYZINE HCL PO Take 50 mg by mouth in the morning, at noon, and at bedtime    Yes Historical Provider, MD   QUEtiapine (SEROQUEL) 25 MG tablet Take 25 mg by mouth daily as needed    Yes Historical Provider, MD   OXYGEN Inhale 2 L into the lungs    Yes Historical Provider, MD   paliperidone (INVEGA) 9 MG extended release tablet Take 9 mg by mouth every morning   Yes Historical Provider, MD   escitalopram (LEXAPRO) 20 MG tablet Take 20 mg by mouth daily   Yes Historical Provider, MD       Future Appointments   Date Time Provider Kana Mcmullen   8/2/2022  9:20 AM Arrie Sever, MD AND [facility] [address] [place] [address]

## 2022-08-09 ENCOUNTER — HOSPITAL ENCOUNTER (OUTPATIENT)
Age: 47
Setting detail: OUTPATIENT SURGERY
Discharge: HOME | End: 2022-08-09
Attending: INTERNAL MEDICINE | Admitting: INTERNAL MEDICINE
Payer: COMMERCIAL

## 2022-08-09 ENCOUNTER — ANESTHESIA EVENT (OUTPATIENT)
Dept: ENDOSCOPY | Age: 47
End: 2022-08-09
Payer: COMMERCIAL

## 2022-08-09 ENCOUNTER — ANESTHESIA (OUTPATIENT)
Dept: ENDOSCOPY | Age: 47
End: 2022-08-09
Payer: COMMERCIAL

## 2022-08-09 VITALS
TEMPERATURE: 98 F | OXYGEN SATURATION: 94 % | HEIGHT: 64 IN | WEIGHT: 200 LBS | RESPIRATION RATE: 16 BRPM | SYSTOLIC BLOOD PRESSURE: 126 MMHG | BODY MASS INDEX: 34.15 KG/M2 | DIASTOLIC BLOOD PRESSURE: 82 MMHG | HEART RATE: 81 BPM

## 2022-08-09 PROCEDURE — C1769 GUIDE WIRE: HCPCS | Performed by: INTERNAL MEDICINE

## 2022-08-09 PROCEDURE — 3700000001 HC ADD 15 MINUTES (ANESTHESIA): Performed by: INTERNAL MEDICINE

## 2022-08-09 PROCEDURE — 2580000003 HC RX 258: Performed by: NURSE ANESTHETIST, CERTIFIED REGISTERED

## 2022-08-09 PROCEDURE — 6360000002 HC RX W HCPCS: Performed by: NURSE ANESTHETIST, CERTIFIED REGISTERED

## 2022-08-09 PROCEDURE — 7100000011 HC PHASE II RECOVERY - ADDTL 15 MIN: Performed by: INTERNAL MEDICINE

## 2022-08-09 PROCEDURE — 7100000010 HC PHASE II RECOVERY - FIRST 15 MIN: Performed by: INTERNAL MEDICINE

## 2022-08-09 PROCEDURE — 3609012700 HC EGD DILATION SAVORY: Performed by: INTERNAL MEDICINE

## 2022-08-09 PROCEDURE — 2709999900 HC NON-CHARGEABLE SUPPLY: Performed by: INTERNAL MEDICINE

## 2022-08-09 PROCEDURE — 3700000000 HC ANESTHESIA ATTENDED CARE: Performed by: INTERNAL MEDICINE

## 2022-08-09 RX ORDER — LORAZEPAM 2 MG/1
2 TABLET ORAL 3 TIMES DAILY
COMMUNITY

## 2022-08-09 RX ORDER — PROPOFOL 10 MG/ML
INJECTION, EMULSION INTRAVENOUS PRN
Status: DISCONTINUED | OUTPATIENT
Start: 2022-08-09 | End: 2022-08-09 | Stop reason: SDUPTHER

## 2022-08-09 RX ORDER — SODIUM CHLORIDE, SODIUM LACTATE, POTASSIUM CHLORIDE, CALCIUM CHLORIDE 600; 310; 30; 20 MG/100ML; MG/100ML; MG/100ML; MG/100ML
INJECTION, SOLUTION INTRAVENOUS CONTINUOUS
Status: CANCELLED | OUTPATIENT
Start: 2022-08-09

## 2022-08-09 RX ORDER — SODIUM CHLORIDE, SODIUM LACTATE, POTASSIUM CHLORIDE, CALCIUM CHLORIDE 600; 310; 30; 20 MG/100ML; MG/100ML; MG/100ML; MG/100ML
INJECTION, SOLUTION INTRAVENOUS CONTINUOUS PRN
Status: DISCONTINUED | OUTPATIENT
Start: 2022-08-09 | End: 2022-08-09 | Stop reason: SDUPTHER

## 2022-08-09 RX ADMIN — PROPOFOL 100 MG: 10 INJECTION, EMULSION INTRAVENOUS at 09:27

## 2022-08-09 RX ADMIN — SODIUM CHLORIDE, POTASSIUM CHLORIDE, SODIUM LACTATE AND CALCIUM CHLORIDE: 600; 310; 30; 20 INJECTION, SOLUTION INTRAVENOUS at 09:26

## 2022-08-09 RX ADMIN — PROPOFOL 50 MG: 10 INJECTION, EMULSION INTRAVENOUS at 09:33

## 2022-08-09 RX ADMIN — PROPOFOL 50 MG: 10 INJECTION, EMULSION INTRAVENOUS at 09:30

## 2022-08-09 ASSESSMENT — PAIN - FUNCTIONAL ASSESSMENT
PAIN_FUNCTIONAL_ASSESSMENT: NONE - DENIES PAIN

## 2022-08-09 NOTE — ANESTHESIA PRE PROCEDURE
DAILY 4/15/22   Jamar Mayberry MD   albuterol (PROVENTIL) (2.5 MG/3ML) 0.083% nebulizer solution Take 3 mLs by nebulization every 4 hours as needed for Wheezing 2/23/22   Mayur Maier MD   promethazine (PHENERGAN) 25 MG tablet Take 25 mg by mouth every 6 hours as needed for Nausea    Historical Provider, MD   pantoprazole sodium (PROTONIX) 40 MG PACK packet Take 40 mg by mouth daily    Historical Provider, MD   HYDROcodone-acetaminophen (NORCO) 7.5-325 MG per tablet Take 1 tablet by mouth every 6 hours as needed. Historical Provider, MD   lamoTRIgine (LAMICTAL) 25 MG tablet Take 2 tablets by mouth daily 12/30/21   Mayur Maier MD   omeprazole (PRILOSEC) 40 MG delayed release capsule Take 1 capsule by mouth 2 times daily 9/23/21   Mayur Maier MD   atorvastatin (LIPITOR) 10 MG tablet Take 1 tablet by mouth daily 9/23/21   Mayur Maier MD   levocetirizine Ameena Serge) 5 MG tablet Take 1 tablet by mouth nightly 9/23/21   Mayur Maier MD   carvedilol (COREG) 6.25 MG tablet Take 1 tablet by mouth 2 times daily 9/23/21   Mayur Maier MD   aspirin 81 MG EC tablet Take 1 tablet by mouth daily 9/23/21   Mayur Maier MD   gabapentin (NEURONTIN) 300 MG capsule Take 600 mg by mouth 3 times daily. 2/25/21   Historical Provider, MD   HYDROXYZINE HCL PO Take 50 mg by mouth in the morning, at noon, and at bedtime     Historical Provider, MD   QUEtiapine (SEROQUEL) 25 MG tablet Take 25 mg by mouth daily as needed     Historical Provider, MD   OXYGEN Inhale 2 L into the lungs     Historical Provider, MD   paliperidone (INVEGA) 9 MG extended release tablet Take 9 mg by mouth every morning    Historical Provider, MD   escitalopram (LEXAPRO) 20 MG tablet Take 20 mg by mouth daily    Historical Provider, MD       Current medications:    No current facility-administered medications for this encounter.      Current Outpatient Medications   Medication Sig Dispense Refill    Probiotic Product (DIGESTIVE ADVANTAGE PO) Take by mouth      APPLE CIDER VINEGAR PO Take by mouth      budesonide-formoterol (SYMBICORT) 160-4.5 MCG/ACT AERO Inhale 2 puffs into the lungs in the morning and 2 puffs before bedtime. 6 each 5    clonazePAM (KLONOPIN) 1 MG tablet Take 1 mg by mouth 2 times daily as needed.  LINZESS 290 MCG CAPS capsule TAKE 1 CAPSULE BY MOUTH DAILY ON A EMPTY STOMACH AT LEAST 30 MINUTES BEFORE 1ST MEAL 30 capsule 5    docusate sodium (COLACE) 100 MG capsule TAKE 1 CAPSULE BY MOUTH TWICE A DAY AS NEEDED 60 capsule 5    furosemide (LASIX) 40 MG tablet TAKE 1 TABLET BY MOUTH DAILY 28 tablet 1    cyclobenzaprine (FLEXERIL) 10 MG tablet Take 10 mg by mouth 3 times daily as needed for Muscle spasms      imiquimod (ALDARA) 5 % cream APPLY 1 PACKET TOPICALLY TO AFFECTED AREA ON BODY THREE TIMES WEEKLY AT BEDTIME AND LEAVE ON FOR 8 HOURS, THEN WASH OFF AS DIRECTED BY PRESCRIBER 24 each 0    albuterol sulfate HFA (PROVENTIL;VENTOLIN;PROAIR) 108 (90 Base) MCG/ACT inhaler INHALE 2 PUFFS INTO THE LUNGS EVERY 6 HOURS AS NEEDED FOR SHORTNESS OF BREATH 18 g 3    fluticasone (FLONASE) 50 MCG/ACT nasal spray 1 SPRAY BY EACH NOSTRIL ROUTE DAILY 16 g 5    tiotropium (SPIRIVA RESPIMAT) 2.5 MCG/ACT AERS inhaler INHALE 2 PUFFS DAILY 4 g 3    albuterol (PROVENTIL) (2.5 MG/3ML) 0.083% nebulizer solution Take 3 mLs by nebulization every 4 hours as needed for Wheezing 120 each 11    promethazine (PHENERGAN) 25 MG tablet Take 25 mg by mouth every 6 hours as needed for Nausea      pantoprazole sodium (PROTONIX) 40 MG PACK packet Take 40 mg by mouth daily      HYDROcodone-acetaminophen (NORCO) 7.5-325 MG per tablet Take 1 tablet by mouth every 6 hours as needed.       lamoTRIgine (LAMICTAL) 25 MG tablet Take 2 tablets by mouth daily 60 tablet 5    omeprazole (PRILOSEC) 40 MG delayed release capsule Take 1 capsule by mouth 2 times daily 180 capsule 3    atorvastatin (LIPITOR) 10 MG tablet Take 1 tablet by mouth daily 90 tablet 3    levocetirizine (XYZAL) 5 MG tablet Take 1 tablet by mouth nightly 90 tablet 3    carvedilol (COREG) 6.25 MG tablet Take 1 tablet by mouth 2 times daily 180 tablet 3    aspirin 81 MG EC tablet Take 1 tablet by mouth daily 90 tablet 3    gabapentin (NEURONTIN) 300 MG capsule Take 600 mg by mouth 3 times daily.  HYDROXYZINE HCL PO Take 50 mg by mouth in the morning, at noon, and at bedtime       QUEtiapine (SEROQUEL) 25 MG tablet Take 25 mg by mouth daily as needed       OXYGEN Inhale 2 L into the lungs       paliperidone (INVEGA) 9 MG extended release tablet Take 9 mg by mouth every morning      escitalopram (LEXAPRO) 20 MG tablet Take 20 mg by mouth daily         Allergies:     Allergies   Allergen Reactions    Clindamycin/Lincomycin Other (See Comments), Hives, Itching and Shortness Of Breath     Weakness, and dizziness  Eye and throat swelling       Penicillins Swelling and Other (See Comments)     Other reaction(s): anaphylaxis/angioedema, anaphylaxis/angioedema  Throat swelling  Eye and throat swelling   Eye and throat swelling      Amoxicillin     Citalopram Hydrobromide      She thinks it made her bp to elevate    Clavulanic Acid Nausea And Vomiting       Problem List:    Patient Active Problem List   Diagnosis Code    Psychosis ([facility] F29    PTSD (post-traumatic stress disorder) F43.10    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 ([facility] F60.2    COPD exacerbation ([facility]) J44.1    RUTH (obstructive sleep apnea) G47.33    Chronic GERD K21.9    Severe obesity (BMI 35.0-35.9 with comorbidity) ([facility] E66.01, Z68.35    Current smoker F17.200    Essential hypertension I10    Mixed hyperlipidemia E78.2    Moran's esophagus without dysplasia K22.70    CRPS (complex regional pain syndrome type II) G56.40    COPD (chronic obstructive pulmonary disease) ([facility]) J44.9    Chronic rhinitis J31.0    Chest congestion R09.89       Past Medical History: Diagnosis Date    Arthritis     Asthma     Bipolar 1 disorder (HCC)     Chronic constipation     [facility]) [facility]     CRPS (complex regional pain syndrome type II)     Dental abscess     Essential hypertension 2021    GERD (gastroesophageal reflux disease)     Hx of blood clots     hx of pos d dimer    Low back pain     Mixed hyperlipidemia 2021    Orthostatic hypotension     Osteoporosis     Peptic ulcer disease     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 ([facility]     Psychosis ([facility]     PTSD (post-traumatic stress disorder)        Past Surgical History:        Procedure Laterality Date    ANKLE SURGERY Left     APPENDECTOMY  2021     LAPAROSCOPIC APPENDECTOMY      SECTION      HYSTERECTOMY, TOTAL ABDOMINAL (CERVIX REMOVED)      LAPAROSCOPIC APPENDECTOMY N/A 2021    LAPAROSCOPIC APPENDECTOMY performed by Tracie Leblanc MD at [place] [address] History:    Social History     Tobacco Use    Smoking status: Former     Packs/day: 0.50     Types: Cigarettes     Start date: 1994    Smokeless tobacco: Never   Substance Use Topics    Alcohol use: No                                Counseling given: Not Answered      Vital Signs (Current): There were no vitals filed for this visit.                                            BP Readings from Last 3 Encounters:   22 122/74   22 98/60   21 (!) 135/92       NPO Status:                                                                                 BMI:   Wt Readings from Last 3 Encounters:   22 202 lb 6.4 oz (91.8 kg)   22 201 lb (91.2 kg)   21 210 lb (95.3 kg)     There is no height or weight on file to calculate BMI.    CBC:   Lab Results   Component Value Date/Time    WBC 9.9 2022 05:55 PM    RBC 4.90 2022 05:55 PM    HGB 13.4 2022 05:55 PM    HCT 40.9 2022 05:55 PM    MCV 83.5 06/25/2022 05:55 PM    RDW 15.5 08/30/2021 11:41 AM     06/25/2022 05:55 PM     08/30/2021 11:41 AM       CMP:   Lab Results   Component Value Date/Time     06/25/2022 05:55 PM    K 3.9 06/25/2022 05:55 PM     06/25/2022 05:55 PM    CO2 24 06/25/2022 05:55 PM    BUN 12 06/25/2022 05:55 PM    CREATININE 0.9 06/25/2022 05:55 PM    GFRAA >60 06/09/2022 01:36 PM    AGRATIO 1.6 06/25/2022 05:55 PM    LABGLOM 68 06/25/2022 05:55 PM    LABGLOM >60 06/09/2022 01:36 PM    GLUCOSE 98 06/09/2022 01:36 PM    PROT 7.1 06/25/2022 05:55 PM    CALCIUM 9.2 06/25/2022 05:55 PM    BILITOT 0.3 06/25/2022 05:55 PM    ALKPHOS 76 06/25/2022 05:55 PM    AST 31 06/25/2022 05:55 PM    ALT 27 06/25/2022 05:55 PM       POC Tests: No results for input(s): POCGLU, POCNA, POCK, POCCL, POCBUN, POCHEMO, POCHCT in the last 72 hours.     Coags: No results found for: PROTIME, INR, APTT    HCG (If Applicable):   Lab Results   Component Value Date    PREGTESTUR Negative 01/08/2014        ABGs: No results found for: PHART, PO2ART, BVU3FBA, HIT2JNU, BEART, Y4ABTHVO     Type & Screen (If Applicable):  No results found for: LABABO, LABRH    Drug/Infectious Status (If Applicable):  No results found for: HIV, HEPCAB    COVID-19 Screening (If Applicable):   Lab Results   Component Value Date/Time    COVID19 DETECTED 06/25/2022 04:58 PM    COVID19 NEGATIVE 02/01/2022 07:53 PM           Anesthesia Evaluation  Patient summary reviewed and Nursing notes reviewed no history of anesthetic complications:   Airway: Mallampati: III     Neck ROM: full     Dental:          Pulmonary:Negative Pulmonary ROS and normal exam    (+) COPD:  sleep apnea:  asthma:                            Cardiovascular:Negative CV ROS    (+) hypertension:, hyperlipidemia                  Neuro/Psych:   Negative Neuro/Psych ROS  (+) neuromuscular disease:, psychiatric history:            GI/Hepatic/Renal: Neg GI/Hepatic/Renal ROS  (+) GERD: well controlled, PUD, (-) hiatal hernia       Endo/Other: Negative Endo/Other ROS   (+) : arthritis:., .                 Abdominal:             Vascular:   + DVT, . Other Findings:           Anesthesia Plan      general     ASA 3     (I discussed with the patient the risks and benefits of PIV, general anesthesia, IV Narcotics, PACU. All questions were answered the patient agrees with the plan and wishes to proceed.  )  Induction: intravenous. 21  Homogeneous uptake of isotope throughout the left ventricle on both stress and rest images.      No evidence of myocardial ischemia or prior myocardial infarction.      Scan indicates low risk for cardiac events.            Reynaldo Sellers MD   8/9/2022

## 2022-08-09 NOTE — H&P
Endoscopy Note    Patient: Gregory Xiao  : 1975      Procedure: Esophagogastroduodenoscopy with savory dilation    Date:  2022     Surgeon:  Kalani Peterson MD     Referring Physician:  Jc Kim MD      History:      INDICATION: Hiatal hernia     ASA: 2  SEDATION: MAC         Operative Surgeon: Kalani Peterson MD  Scope Type: gastroscope      Preoperative Diagnosis: Hiatal hernia  Postoperative Diagnosis: Hiatal hernia    Procedure Performed: EGD    Procedure Details:    With the patient in left lateral position the endoscope was passed through the hypopharynx into the esophagus. The scope as then passed through the esophagus to the second portion of the duodenum. All visualized portions were carefully inspected. The gastric air was suctioned and the scope as removed. Patient tolerated the procedure well. Findings and maneuvers are discussed below. Complications:  None  Estimated Blood Loss: minimal to none    Post Operative Findings:   Esophagus: Esophageal mucosa was normal.  There was a 2 cm hiatal hernia. The Z-line was abnormal but there is no signs of Moran's esophagus. At the end of the procedure a [address] savory dilation was performed with mild resistance relook demonstrated small amount of heme in the upper esophagus just after the upper esophageal sphincter no deep tear appreciated. Stomach: Hiatal hernia on retroflexion otherwise stomach was normal.    Duodenum: Normal    Plan: Follow-up in clinic for further symptoms and management. I believe she follows with Dr. Chel Kramer  I did not appreciate any significant Moran's esophagus today. Irregular Z-line she has had biopsies of this area before which did not demonstrate intestinal metaplasia. Consider esophagram going forward. Signed By: MD Kalani Gu MD,   [facility]  [phone]    Please note that some or all of this record was generated using voice recognition software.  If there are any questions about the content of this document, please contact the author as some errors in translation may have occurred.

## 2022-08-09 NOTE — PROGRESS NOTES
Rafita Gamboa RN regarding pt coughing up small amt of coffee ground emesis/sputum. Pt denies nausea.  Shereen Ferrari stated she will update MD.

## 2022-08-09 NOTE — PROGRESS NOTES
Received report from Alma Thakkar CRNA and [facility], [facility]. VSS with sp02 94% on r/a. Pt awakens to voice. Denies pain and nausea. Will continue to monitor.

## 2022-08-09 NOTE — PROGRESS NOTES
Pt states she does not have a ride to take her home with S.O. present. [employer] service to  pt and significant other. Waiting for voucher to be given to pt.

## 2022-08-09 NOTE — PROGRESS NOTES
Discharge instructions reviewed with patient and significant other. Patient and significant other verbalized understanding. All home medications have been reviewed, questions answered and patient voiced understanding. Given discharge instructions.

## 2022-08-09 NOTE — H&P
[address]   Pre-operative History and Physical    Patient: Adam Berger  : 1975  Acct#:     HISTORY OF PRESENT ILLNESS:    The patient is a 41 y.o. female who presents for history of Moran's esophagus and dysphagia. Multiple health issues. Indications: Moran's esophagus and dysphagia    Past Medical History:        Diagnosis Date    Arthritis     Asthma     Bipolar 1 disorder (HCC)     Chronic constipation     COPD (chronic obstructive pulmonary disease) (HCC)     CRPS (complex regional pain syndrome type II)     Dental abscess     Essential hypertension 2021    GERD (gastroesophageal reflux disease)     Hx of blood clots     hx of pos d dimer    Low back pain     Mixed hyperlipidemia 2021    Orthostatic hypotension     Osteoporosis     Peptic ulcer disease     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 ([facility]     Psychosis ([facility]     PTSD (post-traumatic stress disorder)       Past Surgical History:        Procedure Laterality Date    ANKLE SURGERY Left     APPENDECTOMY  2021     LAPAROSCOPIC APPENDECTOMY      SECTION      HYSTERECTOMY, TOTAL ABDOMINAL (CERVIX REMOVED)      LAPAROSCOPIC APPENDECTOMY N/A 2021    LAPAROSCOPIC APPENDECTOMY performed by Suzy As[facility]w [facility]      Medications Prior to Admission:   No current facility-administered medications on file prior to encounter. Current Outpatient Medications on File Prior to Encounter   Medication Sig Dispense Refill    LORazepam (ATIVAN) 2 MG tablet Take 2 mg by mouth in the morning and 2 mg at noon and 2 mg before bedtime. Probiotic Product (DIGESTIVE ADVANTAGE PO) Take by mouth      APPLE CIDER VINEGAR PO Take by mouth      budesonide-formoterol (SYMBICORT) 160-4.5 MCG/ACT AERO Inhale 2 puffs into the lungs in the morning and 2 puffs before bedtime. 6 each 5    clonazePAM (KLONOPIN) 1 MG tablet Take 1 mg by mouth 2 times daily as needed. LINZESS 290 MCG CAPS capsule TAKE 1 CAPSULE BY MOUTH DAILY ON A EMPTY STOMACH AT LEAST 30 MINUTES BEFORE 1ST MEAL 30 capsule 5    docusate sodium (COLACE) 100 MG capsule TAKE 1 CAPSULE BY MOUTH TWICE A DAY AS NEEDED 60 capsule 5    furosemide (LASIX) 40 MG tablet TAKE 1 TABLET BY MOUTH DAILY 28 tablet 1    cyclobenzaprine (FLEXERIL) 10 MG tablet Take 10 mg by mouth 3 times daily as needed for Muscle spasms      imiquimod (ALDARA) 5 % cream APPLY 1 PACKET TOPICALLY TO AFFECTED AREA ON BODY THREE TIMES WEEKLY AT BEDTIME AND LEAVE ON FOR 8 HOURS, THEN WASH OFF AS DIRECTED BY PRESCRIBER 24 each 0    albuterol sulfate HFA (PROVENTIL;VENTOLIN;PROAIR) 108 (90 Base) MCG/ACT inhaler INHALE 2 PUFFS INTO THE LUNGS EVERY 6 HOURS AS NEEDED FOR SHORTNESS OF BREATH 18 g 3    fluticasone (FLONASE) 50 MCG/ACT nasal spray 1 SPRAY BY EACH NOSTRIL ROUTE DAILY 16 g 5    tiotropium (SPIRIVA RESPIMAT) 2.5 MCG/ACT AERS inhaler INHALE 2 PUFFS DAILY 4 g 3    albuterol (PROVENTIL) (2.5 MG/3ML) 0.083% nebulizer solution Take 3 mLs by nebulization every 4 hours as needed for Wheezing 120 each 11    promethazine (PHENERGAN) 25 MG tablet Take 25 mg by mouth every 6 hours as needed for Nausea      pantoprazole sodium (PROTONIX) 40 MG PACK packet Take 40 mg by mouth daily      HYDROcodone-acetaminophen (NORCO) 7.5-325 MG per tablet Take 1 tablet by mouth every 6 hours as needed.       lamoTRIgine (LAMICTAL) 25 MG tablet Take 2 tablets by mouth daily 60 tablet 5    omeprazole (PRILOSEC) 40 MG delayed release capsule Take 1 capsule by mouth 2 times daily 180 capsule 3    atorvastatin (LIPITOR) 10 MG tablet Take 1 tablet by mouth daily 90 tablet 3    levocetirizine (XYZAL) 5 MG tablet Take 1 tablet by mouth nightly 90 tablet 3    carvedilol (COREG) 6.25 MG tablet Take 1 tablet by mouth 2 times daily 180 tablet 3    aspirin 81 MG EC tablet Take 1 tablet by mouth daily 90 tablet 3    gabapentin (NEURONTIN) 300 MG capsule Take 600 mg by mouth 3 times daily.       HYDROXYZINE HCL PO Take 50 mg by mouth in the morning, at noon, and at bedtime       QUEtiapine (SEROQUEL) 25 MG tablet Take 25 mg by mouth daily as needed       OXYGEN Inhale 2 L into the lungs       paliperidone (INVEGA) 9 MG extended release tablet Take 9 mg by mouth every morning      escitalopram (LEXAPRO) 20 MG tablet Take 20 mg by mouth daily          Allergies:  Clindamycin/lincomycin, Penicillins, Amoxicillin, Citalopram hydrobromide, and Clavulanic acid    Social History:   Social History     Socioeconomic History    Marital status:      Spouse name: Not on file    Number of children: Not on file    Years of education: Not on file    Highest education level: Not on file   Occupational History    Not on file   Tobacco Use    Smoking status: Every Day     Packs/day: 1.00     Types: Cigarettes     Start date: 1/28/1994    Smokeless tobacco: Never   Vaping Use    Vaping Use: Never used   Substance and Sexual Activity    Alcohol use: No    Drug use: Not Currently     Types: Marijuana Champ Cue)    Sexual activity: Not Currently   Other Topics Concern    Not on file   Social History Narrative    Not on file     Social Determinants of Health     Financial Resource Strain: Not on file   Food Insecurity: No Food Insecurity    Worried About Running Out of Food in the Last Year: Never true    Ran Out of Food in the Last Year: Never true   Transportation Needs: Unmet Transportation Needs    Lack of Transportation (Medical): Yes    Lack of Transportation (Non-Medical):  Not on file   Physical Activity: Not on file   Stress: Not on file   Social Connections: Not on file   Intimate Partner Violence: Not on file   Housing Stability: Not on file      Family History:       Problem Relation Age of Onset    Cancer Mother     Cancer Father     Cancer Brother     Heart Disease Brother         PHYSICAL EXAM:      /74   Pulse 85   Temp 98.8 °F (37.1 °C) (Temporal)   Resp 18   Ht 5' 4\" (1.626 m)   Wt 200 lb (90.7 kg)   LMP 12/18/2013   SpO2 95%   BMI 34.33 kg/m²  I        Heart:  Normal apical impulse, regular rate and rhythm, normal S1 and S2, no S3 or S4, and no murmur noted    Lungs:  No increased work of breathing, good air exchange, clear to auscultation bilaterally, no crackles or wheezing    Abdomen:  No scars, normal bowel sounds, soft, non-distended, non-tender, no masses palpated, no hepatosplenomegally      ASA Class  ASA 3 - Patient with moderate systemic disease with functional limitations    Mallampati Class: 3      ASSESSMENT AND PLAN:    1. Patient is a suitable candidate for endoscopic procedure and attendant anesthesia  2. Risks, benefits, alternatives of procedure discussed in detail with patient including risks of bleeding, infection, perforation, risks of sedation, risks of missed lesions. The patient wishes to proceed.

## 2022-08-09 NOTE — ANESTHESIA POSTPROCEDURE EVALUATION
Department of Anesthesiology  Postprocedure Note    Patient: Danisha Rivera  MRN: [id number]  YOB: 1975  Date of evaluation: 8/9/2022      Procedure Summary     Date: 08/09/22 Room / Location: [address] [place] [address]    Anesthesia Start: 5030 Anesthesia Stop: 8601    Procedure: EGD DILATION SAVORY Diagnosis:       Gastroesophageal reflux disease, unspecified whether esophagitis present      (GERD)    Surgeons: Madisyn Joe MD Responsible Provider: Adiel Rodney MD    Anesthesia Type: general ASA Status: 3          Anesthesia Type: No value filed.     Madhavi Phase I: Madhavi Score: 10    Madhavi Phase II: Madhavi Score: 10      Anesthesia Post Evaluation    Comments: Postoperative Anesthesia Note    Name:    Danisha Rivera  MRN:      [id number]    Patient Vitals in the past 12 hrs:  08/09/22 0955, BP:126/82, Temp:98 °F (36.7 °C), Temp src:Infrared, Pulse:81, Resp:16, SpO2:94 %  08/09/22 0939, BP:107/70, Temp:98.8 °F (37.1 °C), Temp src:Infrared, Pulse:92, Resp:16, SpO2:94 %  08/09/22 0837, BP:114/74, Temp:98.8 °F (37.1 °C), Temp src:Temporal, Pulse:85, Resp:18, SpO2:95 %, Height:5' 4\" (1.626 m), Weight:200 lb (90.7 kg)     LABS:    CBC  Lab Results       Component                Value               Date/Time                  WBC                      9.9                 06/25/2022 05:55 PM        HGB                      13.4                06/25/2022 05:55 PM        HCT                      40.9                06/25/2022 05:55 PM        PLT                      261                 06/25/2022 05:55 PM        PLT                      288                 08/30/2021 11:41 AM   RENAL  Lab Results       Component                Value               Date/Time                  NA                       138                 06/25/2022 05:55 PM        K                        3.9                 06/25/2022 05:55 PM        CL                       103                 06/25/2022 05:55 PM CO2                      24                  06/25/2022 05:55 PM        BUN                      12                  06/25/2022 05:55 PM        CREATININE               0.9                 06/25/2022 05:55 PM        GLUCOSE                  98                  06/09/2022 01:36 PM   COAGS  No results found for: PROTIME, INR, APTT    Intake & Output:  @43VBGL@    Nausea & Vomiting:  No    Level of Consciousness:  Awake    Pain Assessment:  Adequate analgesia    Anesthesia Complications:  No apparent anesthetic complications    SUMMARY      Vital signs stable  OK to discharge from Stage I post anesthesia care.   Care transferred from Anesthesiology department on discharge from perioperative area

## 2022-08-09 NOTE — DISCHARGE INSTRUCTIONS
PATIENT INSTRUCTIONS  POST-SEDATION    Lori Pathak          IMMEDIATELY FOLLOWING PROCEDURE:    Do not drive or operate machinery for the first twenty four hours after surgery. Do not make any important decisions for twenty four hours after surgery or while taking narcotic pain medications or sedatives. You should NOT BE LEFT UNATTENDED OR ALONE. A responsible adult should be with you for the rest of the day of your procedure and also during the night for your protection and safety. You may experience some light headedness. Rest at home with activity as tolerated. You may not need to go to bed, but it is important to rest for the next 24 hours. You should not engage in athletic sports such as basketball, volleyball, jogging, skating, or activities requiring refined motor skills for 24 hours. If you develop intractable nausea and vomiting or a severe headache please notify your doctor immediately. You are not expected to have any fever, but if you feel warm, take your temperature. If you have a fever 101 degrees or higher, call your doctor. If you have had an Endoscopy:   *Eat lightly for your first meal and gradually resume your normal / prescribed diet. DO NOT eat or drink until your gag reflex returns. *If you have a sore throat you may use lozenges, or salt water gargles. ONCE YOU ARE HOME, IF YOU SHOULD HAVE:  Difficulty in breathing, persistent nausea or vomiting, bleeding you feel is excessive, or pain that is unusual, increased abdominal bloating, or any swelling, fever / chills, call your physician. If you cannot contact your physician, but feel that your signs and symptoms need a physician's attention, go to the Emergency Department. FOLLOW-UP:    Please follow up with Dr. Eleuterio Otoole as scheduled or needed. Call Dr. Lainey Deluca if there are any GI concerns.  [phone]       ANESTHESIA DISCHARGE INSTRUCTIONS    You are under the influence of drugs- do not drink alcohol, drive a Sinusitis, Adult  Sinusitis is redness, soreness, and inflammation of the paranasal sinuses. Paranasal sinuses are air pockets within the bones of your face. They are located beneath your eyes, in the middle of your forehead, and above your eyes. In healthy paranasal sinuses, mucus is able to drain out, and air is able to circulate through them by way of your nose. However, when your paranasal sinuses are inflamed, mucus and air can become trapped. This can allow bacteria and other germs to grow and cause infection.  Sinusitis can develop quickly and last only a short time (acute) or continue over a long period (chronic). Sinusitis that lasts for more than 12 weeks is considered chronic.  CAUSES  Causes of sinusitis include:  · Allergies.  · Structural abnormalities, such as displacement of the cartilage that separates your nostrils (deviated septum), which can decrease the air flow through your nose and sinuses and affect sinus drainage.  · Functional abnormalities, such as when the small hairs (cilia) that line your sinuses and help remove mucus do not work properly or are not present.  SIGNS AND SYMPTOMS  Symptoms of acute and chronic sinusitis are the same. The primary symptoms are pain and pressure around the affected sinuses. Other symptoms include:  · Upper toothache.  · Earache.  · Headache.  · Bad breath.  · Decreased sense of smell and taste.  · A cough, which worsens when you are lying flat.  · Fatigue.  · Fever.  · Thick drainage from your nose, which often is green and may contain pus (purulent).  · Swelling and warmth over the affected sinuses.  DIAGNOSIS  Your health care provider will perform a physical exam. During your exam, your health care provider may perform any of the following to help determine if you have acute sinusitis or chronic sinusitis:  · Look in your nose for signs of abnormal growths in your nostrils (nasal polyps).  · Tap over the affected sinus to check for signs of  car, operate machinery(such as power tools, kitchen appliances, etc), sign legal documents, or make any important decisions for 24 hours (or while on pain medications). Children should not ride bikes or [place] or play on gym sets  for 24 hours after surgery. A responsible adult should be with you for 24 hours. Rest at home today- increase activity as tolerated. Progress slowly to a regular diet unless your physician has instructed you otherwise. Drink plenty of water. CALL YOUR DOCTOR IF YOU:  Have moderate to severe nausea or vomiting AND are unable to hold down fluids or prescribed medications. Have bright red bloody drainage from your dressing that won't stop oozing. Do not get relief with your pain medication    NORMAL (POSSIBLE) SIDE EFFECTS FROM ANESTHESIA:     Confusion, temporary memory loss, delayed reaction times in the first 24 hours  Lightheadedness, dizziness, difficulty focusing, blurred vision  Nausea/vomiting can happen  Shivering, feeling cold, sore throat, cough and muscle aches should stop within 24-48 hours  Trouble urinating - call your surgeon if it has been more than 8 hrs  Bruising or soreness at the IV site - call if it remains red, firm or there is drainage             FEMALES OF CHILDBEARING AGE WHO ARE TAKING BIRTH CONTROL PILLS:  You may have received a medication during your procedure that interferes with the   actions of birth control pills (Bridion or Emend). Use some other kind of birth control in addition to your pills, like a condom, for 1 month after your procedure to prevent unwanted pregnancy. The following instructions are to be followed if you have a known history or diagnosis of sleep apnea: For all sleep apnea patients:  ? Sleep on your side or sitting up in a chair whenever possible, especially the first 24 hours after surgery. ? Use only medicines prescribed by your doctor. ? Do not drink alcohol.   ? If you have a dental device to assist you while infection.  · View the inside of your sinuses using an imaging device that has a light attached (endoscope).  If your health care provider suspects that you have chronic sinusitis, one or more of the following tests may be recommended:  · Allergy tests.  · Nasal culture. A sample of mucus is taken from your nose, sent to a lab, and screened for bacteria.  · Nasal cytology. A sample of mucus is taken from your nose and examined by your health care provider to determine if your sinusitis is related to an allergy.  TREATMENT  Most cases of acute sinusitis are related to a viral infection and will resolve on their own within 10 days. Sometimes, medicines are prescribed to help relieve symptoms of both acute and chronic sinusitis. These may include pain medicines, decongestants, nasal steroid sprays, or saline sprays.  However, for sinusitis related to a bacterial infection, your health care provider will prescribe antibiotic medicines. These are medicines that will help kill the bacteria causing the infection.  Rarely, sinusitis is caused by a fungal infection. In these cases, your health care provider will prescribe antifungal medicine.  For some cases of chronic sinusitis, surgery is needed. Generally, these are cases in which sinusitis recurs more than 3 times per year, despite other treatments.  HOME CARE INSTRUCTIONS  · Drink plenty of water. Water helps thin the mucus so your sinuses can drain more easily.  · Use a humidifier.  · Inhale steam 3-4 times a day (for example, sit in the bathroom with the shower running).  · Apply a warm, moist washcloth to your face 3-4 times a day, or as directed by your health care provider.  · Use saline nasal sprays to help moisten and clean your sinuses.  · Take medicines only as directed by your health care provider.  · If you were prescribed either an antibiotic or antifungal medicine, finish it all even if you start to feel better.  SEEK IMMEDIATE MEDICAL CARE IF:  · You have  at rest, use it at all times for the first 24 hours. For patients using CPAP machines:  ? Use your CPAP machine during all periods of sleep as usual.  ? Use your CPAP machine during all periods of daytime rest while on pain medicines. ** Follow up with your primary care doctor for continued care. IF YOU DO NOT TAKE ALL OF YOUR NARCOTIC PAIN MEDICATION, please dispose of them responsibly. There are drop off boxes in the Emergency Departments 24/7 at both [facility] and [facility]. If these locations are not convenient, other options for discarding them can be found at:  [email or web address] [email or web address]    Hospital or office staff may NOT accept any medications to drop off in the cabinet for you. increasing pain or severe headaches.  · You have nausea, vomiting, or drowsiness.  · You have swelling around your face.  · You have vision problems.  · You have a stiff neck.  · You have difficulty breathing.     This information is not intended to replace advice given to you by your health care provider. Make sure you discuss any questions you have with your health care provider.     Document Released: 12/18/2006 Document Revised: 01/08/2016 Document Reviewed: 10/12/2016  [employer] Interactive Patient Education ©2016 [employer]  Dizziness  Dizziness is a common problem. It is a feeling of unsteadiness or light-headedness. You may feel like you are about to faint. Dizziness can lead to injury if you stumble or fall. Anyone can become dizzy, but dizziness is more common in older adults. This condition can be caused by a number of things, including medicines, dehydration, or illness.  HOME CARE INSTRUCTIONS  Taking these steps may help with your condition:  Eating and Drinking  · Drink enough fluid to keep your urine clear or pale yellow. This helps to keep you from becoming dehydrated. Try to drink more clear fluids, such as water.  · Do not drink alcohol.  · Limit your caffeine intake if directed by your health care provider.  · Limit your salt intake if directed by your health care provider.  Activity  · Avoid making quick movements.    Rise slowly from chairs and steady yourself until you feel okay.    In the morning, first sit up on the side of the bed. When you feel okay, stand slowly while you hold onto something until you know that your balance is fine.  · Move your legs often if you need to  one place for a long time. Tighten and relax your muscles in your legs while you are standing.  · Do not drive or operate heavy machinery if you feel dizzy.  · Avoid bending down if you feel dizzy. Place items in your home so that they are easy for you to reach without leaning over.  Lifestyle  · Do not use any  tobacco products, including cigarettes, chewing tobacco, or electronic cigarettes. If you need help quitting, ask your health care provider.  · Try to reduce your stress level, such as with yoga or meditation. Talk with your health care provider if you need help.  General Instructions  · Watch your dizziness for any changes.  · Take medicines only as directed by your health care provider. Talk with your health care provider if you think that your dizziness is caused by a medicine that you are taking.  · Tell a friend or a family member that you are feeling dizzy. If he or she notices any changes in your behavior, have this person call your health care provider.  · Keep all follow-up visits as directed by your health care provider. This is important.  SEEK MEDICAL CARE IF:  · Your dizziness does not go away.  · Your dizziness or light-headedness gets worse.  · You feel nauseous.  · You have reduced hearing.  · You have new symptoms.  · You are unsteady on your feet or you feel like the room is spinning.  SEEK IMMEDIATE MEDICAL CARE IF:  · You vomit or have diarrhea and are unable to eat or drink anything.  · You have problems talking, walking, swallowing, or using your arms, hands, or legs.  · You feel generally weak.  · You are not thinking clearly or you have trouble forming sentences. It may take a friend or family member to notice this.  · You have chest pain, abdominal pain, shortness of breath, or sweating.  · Your vision changes.  · You notice any bleeding.  · You have a headache.  · You have neck pain or a stiff neck.  · You have a fever.     This information is not intended to replace advice given to you by your health care provider. Make sure you discuss any questions you have with your health care provider.     Document Released: 06/13/2002 Document Revised: 05/03/2016 Document Reviewed: 12/14/2015  [employer] Interactive Patient Education ©2016 [employer]

## 2022-08-12 NOTE — PROCEDURES
Endoscopy Note      Patient: Nikhil Goff                  : 1975                           Procedure: Esophagogastroduodenoscopy with savory dilation     Date:  2022      Surgeon:  Shereen Grady MD      Referring Physician:  Gayla Avelar MD        History:       INDICATION: Hiatal hernia     ASA: 2  SEDATION: MAC                      Operative Surgeon: Shereen Grady MD  Scope Type: gastroscope        Preoperative Diagnosis: Hiatal hernia  Postoperative Diagnosis: Hiatal hernia     Procedure Performed: EGD     Procedure Details:    With the patient in left lateral position the endoscope was passed through the hypopharynx into the esophagus. The scope as then passed through the esophagus to the second portion of the duodenum. All visualized portions were carefully inspected. The gastric air was suctioned and the scope as removed. Patient tolerated the procedure well. Findings and maneuvers are discussed below. Complications:  None  Estimated Blood Loss: minimal to none     Post Operative Findings:   Esophagus: Esophageal mucosa was normal.  There was a 2 cm hiatal hernia. The Z-line was abnormal but there is no signs of Moran's esophagus. At the end of the procedure a [address] savory dilation was performed with mild resistance relook demonstrated small amount of heme in the upper esophagus just after the upper esophageal sphincter no deep tear appreciated. Stomach: Hiatal hernia on retroflexion otherwise stomach was normal.    Duodenum: Normal     Plan: Follow-up in clinic for further symptoms and management. I believe she follows with Dr. Britt Brown  I did not appreciate any significant Moran's esophagus today. Irregular Z-line she has had biopsies of this area before which did not demonstrate intestinal metaplasia. Consider esophagram going forward.            Signed By: MD Shereen Warner MD,  [facility]  [phone]     Please note that some or all of this record was generated using voice recognition software. If there are any questions about the content of this document, please contact the author as some errors in translation may have occurred.

## 2022-08-24 ENCOUNTER — CARE COORDINATION (OUTPATIENT)
Dept: CARE COORDINATION | Age: 47
End: 2022-08-24

## 2022-08-24 NOTE — TELEPHONE ENCOUNTER
LMOVM. Under the media tab is an RX for [facility]. If she is switching over to them, will need to know what she is wanting us to order from them.

## 2022-09-02 ENCOUNTER — TELEPHONE (OUTPATIENT)
Dept: PULMONOLOGY | Age: 47
End: 2022-09-02

## 2022-09-02 ENCOUNTER — OFFICE VISIT (OUTPATIENT)
Dept: PULMONOLOGY | Age: 47
End: 2022-09-02
Payer: MEDICARE

## 2022-09-02 VITALS
HEART RATE: 72 BPM | SYSTOLIC BLOOD PRESSURE: 112 MMHG | RESPIRATION RATE: 16 BRPM | OXYGEN SATURATION: 95 % | DIASTOLIC BLOOD PRESSURE: 79 MMHG | TEMPERATURE: 97.5 F | HEIGHT: 64 IN | WEIGHT: 199.4 LBS | BODY MASS INDEX: 34.04 KG/M2

## 2022-09-02 DIAGNOSIS — J44.9 CHRONIC OBSTRUCTIVE PULMONARY DISEASE, UNSPECIFIED COPD TYPE (HCC): ICD-10-CM

## 2022-09-02 DIAGNOSIS — G47.33 OSA (OBSTRUCTIVE SLEEP APNEA): ICD-10-CM

## 2022-09-02 DIAGNOSIS — J31.0 CHRONIC RHINITIS: Primary | ICD-10-CM

## 2022-09-02 DIAGNOSIS — F17.200 CURRENT SMOKER: ICD-10-CM

## 2022-09-02 DIAGNOSIS — E66.9 CLASS 1 OBESITY WITH SERIOUS COMORBIDITY AND BODY MASS INDEX (BMI) OF 34.0 TO 34.9 IN ADULT, UNSPECIFIED OBESITY TYPE: ICD-10-CM

## 2022-09-02 PROCEDURE — 99214 OFFICE O/P EST MOD 30 MIN: CPT | Performed by: INTERNAL MEDICINE

## 2022-09-02 ASSESSMENT — SLEEP AND FATIGUE QUESTIONNAIRES
HOW LIKELY ARE YOU TO NOD OFF OR FALL ASLEEP WHEN YOU ARE A PASSENGER IN A CAR FOR AN HOUR WITHOUT A BREAK: 3
HOW LIKELY ARE YOU TO NOD OFF OR FALL ASLEEP WHILE LYING DOWN TO REST IN THE AFTERNOON WHEN CIRCUMSTANCES PERMIT: 3
HOW LIKELY ARE YOU TO NOD OFF OR FALL ASLEEP WHILE WATCHING TV: 1
HOW LIKELY ARE YOU TO NOD OFF OR FALL ASLEEP WHILE SITTING AND TALKING TO SOMEONE: 0
HOW LIKELY ARE YOU TO NOD OFF OR FALL ASLEEP WHILE SITTING INACTIVE IN A PUBLIC PLACE: 3
NECK CIRCUMFERENCE (INCHES): 15.5
ESS TOTAL SCORE: 17
HOW LIKELY ARE YOU TO NOD OFF OR FALL ASLEEP WHILE SITTING AND READING: 3
HOW LIKELY ARE YOU TO NOD OFF OR FALL ASLEEP WHILE SITTING QUIETLY AFTER LUNCH WITHOUT ALCOHOL: 3
HOW LIKELY ARE YOU TO NOD OFF OR FALL ASLEEP IN A CAR, WHILE STOPPED FOR A FEW MINUTES IN TRAFFIC: 1

## 2022-09-02 NOTE — PROGRESS NOTES
MA Communication:   The following orders are received by verbal communication from Janae Faulkner MD    Orders include:  3 month f/u

## 2022-09-02 NOTE — PROGRESS NOTES
Chief Complaint/Referring Provider: Pulmonary evaluation and to discuss the clinical status and test results     Patient is a 28-year-old female who has come to the office for a pulmonary follow-up to discuss the clinical status and test results and compliance, patient states that she initially got herCPAP mask which is not fitting her well and patient states that she is supposed to have a full facemask and patient states that whenever she uses the CPAP machine it is providing her with more energy and patient does not feel that tired, patient also has been trying to lose weight, patient states that she does have some cough with phlegm which is clear in nature, patient also states that her moisture from CPAP is helping with her nasal congestion, patient does not have any wheezing, no fever no chills, patient does have some central chest pain, patient does have reflux symptoms but it is decreased and patient's medications have been dropped from 2 pills a day to 1 pill a day, patient also had recent esophageal stretching, patient also states that her  recently  and patient was quite depressed and patient was started on Ativan by her PCP, patient still smokes about half a pack a day, patient states that she has been using her regular long-term inhaler in the form of Symbicort and Spiriva on a regular basis but patient states the hot weather causes her to have more shortness of breath and patient has been using too much of albuterol and patient finishes to canisters of albuterol a month, patient does not have any confusion lethargy, no other pertinent review of system of concern    PreviousHPI:Virtual visit was done for the patient to discuss the clinical status and the test results, patient states that she has been having some increasing cough with chest congestion along with that patient has been bringing up some secretions which are greenish in color, patient also has been having some nasal congestion and sinus congestion along with that patient has had some epistaxis but no hemoptysis, patient also has been having significant wheezing, patient also has intermittent chills, patient also has had some abdominal discomfort nausea vomiting, patient feels tired and restless in spite of sleeping well and for long time, patient does not have any significant hematochezia or melena, no increasing leg edema, patient continues to be a smoker, patient does not have any change in the ambient environment, patient has taken her vaccinations against COVID-19, patient does not have any confusion lethargy, patient does not have any other pertinent review of systems of concern    Patient has come to the office for an acute visit, patient has been having significant coughing which is not abating, patient states that she does have some sinus congestion along with that patient has cough with greenish respite secretions without any hemoptysis, patient states that he also has some swelling of the eyes, patient also has a having trouble in sleeping and having increased sleep fragmentation, patient was ordered a sleep study on the last visit but patient states that it is in November and when the sleep lab was asked it was noticed that patient never scheduled the sleep test but herself, patient states that her nose is getting stuffed up, patient also states that she was exposed to a cat and patient started having increasing nasal stuffiness after that, patient does have some earaches at times, patient does have some chills at times, patient also has been using the rescue inhaler at least once a day in addition to the Spiriva and Symbicort, patient continues to be a smoker, patient also states that the girl who was living with her was using incense sticks which was causing her to have more symptoms, patient has not lost any weight, patient continues to have increased tiredness and sleepiness in the daytime, patient does not have any other pertinent review of system of concern      Patient is a 74-year-old female who was referred to the office for pulmonary evaluation for asthma and COPD    Patient on evaluation states that she feels shaky this morning, patient states that she has COPD/asthma/pulmonary emphysema along with sleep apnea as has been told to her by her previous providers, patient also states that she has oxygen at home which she takes at nighttime, patient on questioning states that she does have coughing along with that patient has respite secretions which is yellowish-green color without any hemoptysis, patient does have some nasal congestion and also has occasional epistaxis, patient states that she also has pleuritic chest pain, patient does not have any significant fever or chills, patient also states that she has had right ear infection which was treated, patient states that she has Symbicort along with that patient has a nebulizer and rescue inhaler which she has been using off-and-on, patient has had mild sinus congestion, patient has been allergic to pets which were there before but they have been giving away, patient has renal sore throat, patient has questionable oropharyngeal dysphagia, patient does have caffeine on regular basis which causes her to have GERD, patient is taking medication for the same, patient also states that she has irritable bowel syndrome, patient also has occasional leg swelling, patient continues to be a smoker patient smokes about half a pack a day, patient has had trial of nicotine patches and Chantix in the past with some improvement, patient does not have any change in the ambient environment any sick contacts, patient also states that she has a nerve damage in the legs and she does not know why but it is being investigated, patient has been given Mobic and tramadol for the same, patient also states that she had a sleep study done last year at [facility] and was told that she has RUTH but no CPAP was given to her, patient's family states that she stops breathing at nighttime to the extent that he is afraid that she will have a respiratory arrest, no other pertinent review of system of concern    Past Medical History:   Diagnosis Date    Arthritis     Asthma     Bipolar 1 disorder (HCC)     Chronic constipation     COPD (chronic obstructive pulmonary disease) (HCC)     CRPS (complex regional pain syndrome type II)     Dental abscess     Essential hypertension 2021    GERD (gastroesophageal reflux disease)     Hx of blood clots     hx of pos d dimer    Low back pain     Mixed hyperlipidemia 2021    Orthostatic hypotension     Osteoporosis     Peptic ulcer disease     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 ([facility] [address]     Psychosis ([facility] [address]     PTSD (post-traumatic stress disorder)        Past Surgical History:   Procedure Laterality Date    ANKLE SURGERY Left     APPENDECTOMY  2021     LAPAROSCOPIC APPENDECTOMY      SECTION      HYSTERECTOMY, TOTAL ABDOMINAL (CERVIX REMOVED)      LAPAROSCOPIC APPENDECTOMY N/A 2021    LAPAROSCOPIC APPENDECTOMY performed by Kusum Bates MD at [address] [place] [address]  2022    EGD DILATION SAVORY performed by Martin Vo MD at [facility]       Allergies   Allergen Reactions    Clindamycin/Lincomycin Other (See Comments), Hives, Itching and Shortness Of Breath     Weakness, and dizziness  Eye and throat swelling       Penicillins Swelling and Other (See Comments)     Other reaction(s): anaphylaxis/angioedema, anaphylaxis/angioedema  Throat swelling  Eye and throat swelling   Eye and throat swelling      Amoxicillin     Citalopram Hydrobromide      She thinks it made her bp to elevate    Clavulanic Acid Nausea And Vomiting       Medication list was reviewed and updated as needed in Epic    Social History     Socioeconomic History    Marital status:      Spouse name: Not on file    Number of children: Not on file    Years of education: Not on file    Highest education level: Not on file   Occupational History    Not on file   Tobacco Use    Smoking status: Every Day     Packs/day: 0.50     Types: Cigarettes     Start date: 1/28/1994    Smokeless tobacco: Never    Tobacco comments:     Currently trying to quit   Vaping Use    Vaping Use: Never used   Substance and Sexual Activity    Alcohol use: No    Drug use: Not Currently     Types: Marijuana Candiss Littler)    Sexual activity: Not Currently   Other Topics Concern    Not on file   Social History Narrative    Not on file     Social Determinants of Health     Financial Resource Strain: Not on file   Food Insecurity: No Food Insecurity    Worried About Running Out of Food in the Last Year: Never true    Ran Out of Food in the Last Year: Never true   Transportation Needs: Unmet Transportation Needs    Lack of Transportation (Medical): Yes    Lack of Transportation (Non-Medical): Not on file   Physical Activity: Not on file   Stress: Not on file   Social Connections: Not on file   Intimate Partner Violence: Not on file   Housing Stability: Not on file       Family History   Problem Relation Age of Onset    Cancer Mother     Cancer Father     Cancer Brother     Heart Disease Brother             Review of Systems same as above    Physical Exam:  Blood pressure 112/79, pulse 72, temperature 97.5 °F (36.4 °C), temperature source Temporal, resp. rate 16, height 5' 4\" (1.626 m), weight 199 lb 6.4 oz (90.4 kg), last menstrual period 12/18/2013, SpO2 95 %.'  Constitutional:  No acute distress. But has had increased chest congestion which was audible on the virtual visit  HENT:  Oropharynx is clear and moist. No thyromegaly. Annual strep otitis  Eyes:  Conjunctivae arenormal. Pupils equal, round, and reactive to light. No scleral icterus. Neck: . No tracheal deviation present. No obvious thyroid mass.   Short enlarged neck  Cardiovascular:Normal rate, regular rhythm, normal heart sounds. No right ventricular heave. Minimal lower extremity edema. Pulmonary/Chest: Bilateral wheezing  No rales. Chest wall is not dull to percussion. Noaccessory muscle usage or stridor. Prolonged expiration with decreased breath sound intensity  Abdominal: Soft. Bowel sounds present. No distension or hernia. Notenderness. Musculoskeletal: No cyanosis. No clubbing. No obvious joint deformity. Lymphadenopathy: No cervical or supraclavicular adenopathy. Skin: Skin is warm and dry. No rash or nodules on the exposed extremities. Psychiatric: Normal mood and affect. Behavior is normal.  No anxiety. Neurologic: Alert, awake and oriented. PERRL. Speech fluent        Data:     Imaging:  I have reviewed radiology images personally. No orders to display     ECHO in 2020 from care everywhere-CONCLUSIONS     Normal global left ventricular size and systolic function with no obvious regional wall motion abnormalities. Normal right ventricular size and function. Normal left atrial size. Mild-moderate tricuspid regurgitation. Normal size inferior vena cava with normal inspiratory response. Normal size aortic root and proximal ascending aorta. Nuclear stress test- IMPRESSIONS    Homogeneous uptake of isotope throughout the left ventricle on both stress and rest images. No evidence of myocardial ischemia or prior myocardial infarction.      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 COPD       2. Obstructive sleep apnea      3. Gastroesophageal reflux disease, unspecified whether esophagitis present      4. Class 2 severe obesity with serious comorbidity and body mass index (BMI) of 34.0 to 34.9 in adult, unspecified obesity type ([address]      5.  Current smoker          Plan:   Patient's review of system were discussed   Patient was told about the clinical finding including auscultation and implications  Patient does not have any adventitious sounds on auscultation and seen  Patient's compliance data was reviewed and patient has not been compliant with the use of CPAP machine  Patient states that it will improve after she uses the full facemask  The CPAP machine is efficacious and does not need any change in the pressures  Patient has to be careful about treating the Ativan when sleep apnea as she can have suppression of the respiratory status patient caused her to have increasing hypoventilation and hypercarbia  Steam inhalation will help  Patient was told about the pathophysiology of the disease process and its modifying factors  Smoking needs to be stopped otherwise patient will have increasing morbidity and mortality and patient can be respiratory cripple which was reinforced  Patient to avoid allergens including cats  Patient was told about the pathophysiology and sequelae of RUTH  Patient will take Symbicort inhaler 2 puffs twice a day and to rinse mouth with water after use  Patient to continue with Spiriva as before  Patient also takes albuterol inhaler or nebulizer when required  Patient was shown how to take it properly and was told to take once a day along with Symbicort on regular basis  Patient to take albuterol inhaler 2 puffs every 6 on whenever necessary basis or nebulizer if need be-patient has been using the inhaler too much which we can be for counterproductive patient was told to the patient and also can have more side effects including tremors palpitations cramps and dizziness  Will reassess in the future about repeating a PFT  Patient was told about diet and lifestyle modifications  Patient's medication for PPI as per PCP to continue  Patient needs to lose weight  Patient needs to have a regular exercise and if patient cannot do any walking because of her neuropathy and patient may consider some water aerobics  Smoking cessation is imperative  Patient was told about the compliance requirements with a CPAP  Patien needs to be proactive in taking care of herself otherwise she will have increasing morbidity and mortality  Patient needs to have a comprehensive plan about diet lifestyle modifications medications and follow-ups and smoking cessation  Patient is up-to-date on the COVID-19 vaccine  Patient will get a flu shot in end of September onwards  Repeat compliance data to be obtained in 6 weeks time and follow-up in 3 months if that compliance data is reasonable

## 2022-09-07 DIAGNOSIS — J44.1 COPD EXACERBATION (HCC): ICD-10-CM

## 2022-09-07 RX ORDER — OMEPRAZOLE 40 MG/1
40 CAPSULE, DELAYED RELEASE ORAL 2 TIMES DAILY
Qty: 180 CAPSULE | Refills: 3 | Status: SHIPPED | OUTPATIENT
Start: 2022-09-07 | End: 2022-09-12 | Stop reason: SDUPTHER

## 2022-09-07 RX ORDER — DOCUSATE SODIUM 100 MG/1
CAPSULE, LIQUID FILLED ORAL
Qty: 180 CAPSULE | Refills: 3 | Status: SHIPPED | OUTPATIENT
Start: 2022-09-07 | End: 2022-09-12 | Stop reason: SDUPTHER

## 2022-09-07 RX ORDER — ALBUTEROL SULFATE 90 UG/1
2 AEROSOL, METERED RESPIRATORY (INHALATION) EVERY 6 HOURS PRN
Qty: 18 G | Refills: 3 | Status: SHIPPED | OUTPATIENT
Start: 2022-09-07 | End: 2022-09-12 | Stop reason: SDUPTHER

## 2022-09-07 RX ORDER — LINACLOTIDE 290 UG/1
CAPSULE, GELATIN COATED ORAL
Qty: 90 CAPSULE | Refills: 3 | Status: SHIPPED | OUTPATIENT
Start: 2022-09-07 | End: 2022-09-12 | Stop reason: SDUPTHER

## 2022-09-07 RX ORDER — LAMOTRIGINE 25 MG/1
50 TABLET ORAL DAILY
Qty: 180 TABLET | Refills: 3 | Status: SHIPPED | OUTPATIENT
Start: 2022-09-07 | End: 2022-09-12 | Stop reason: SDUPTHER

## 2022-09-07 RX ORDER — ALBUTEROL SULFATE 2.5 MG/3ML
2.5 SOLUTION RESPIRATORY (INHALATION) EVERY 4 HOURS PRN
Qty: 120 EACH | Refills: 11 | Status: SHIPPED | OUTPATIENT
Start: 2022-09-07 | End: 2022-09-12 | Stop reason: SDUPTHER

## 2022-09-07 RX ORDER — CARVEDILOL 6.25 MG/1
6.25 TABLET ORAL 2 TIMES DAILY
Qty: 180 TABLET | Refills: 3 | Status: SHIPPED | OUTPATIENT
Start: 2022-09-07 | End: 2022-09-12 | Stop reason: SDUPTHER

## 2022-09-07 RX ORDER — FLUTICASONE PROPIONATE 50 MCG
1 SPRAY, SUSPENSION (ML) NASAL DAILY
Qty: 16 G | Refills: 5 | Status: SHIPPED | OUTPATIENT
Start: 2022-09-07 | End: 2022-09-12 | Stop reason: SDUPTHER

## 2022-09-07 RX ORDER — ASPIRIN 81 MG/1
81 TABLET ORAL DAILY
Qty: 90 TABLET | Refills: 3 | Status: SHIPPED | OUTPATIENT
Start: 2022-09-07 | End: 2022-09-12 | Stop reason: SDUPTHER

## 2022-09-07 RX ORDER — BUDESONIDE AND FORMOTEROL FUMARATE DIHYDRATE 160; 4.5 UG/1; UG/1
2 AEROSOL RESPIRATORY (INHALATION) 2 TIMES DAILY
Qty: 6 EACH | Refills: 5 | Status: SHIPPED | OUTPATIENT
Start: 2022-09-07 | End: 2022-09-12 | Stop reason: SDUPTHER

## 2022-09-07 RX ORDER — ATORVASTATIN CALCIUM 10 MG/1
10 TABLET, FILM COATED ORAL DAILY
Qty: 90 TABLET | Refills: 3 | Status: SHIPPED | OUTPATIENT
Start: 2022-09-07 | End: 2022-09-12 | Stop reason: SDUPTHER

## 2022-09-07 RX ORDER — LEVOCETIRIZINE DIHYDROCHLORIDE 5 MG/1
5 TABLET, FILM COATED ORAL NIGHTLY
Qty: 90 TABLET | Refills: 3 | Status: SHIPPED | OUTPATIENT
Start: 2022-09-07 | End: 2022-09-12 | Stop reason: SDUPTHER

## 2022-09-07 RX ORDER — FUROSEMIDE 40 MG/1
40 TABLET ORAL DAILY
Qty: 28 TABLET | Refills: 1 | Status: SHIPPED | OUTPATIENT
Start: 2022-09-07 | End: 2022-09-12 | Stop reason: SDUPTHER

## 2022-09-12 ENCOUNTER — OFFICE VISIT (OUTPATIENT)
Dept: FAMILY MEDICINE CLINIC | Age: 47
End: 2022-09-12
Payer: MEDICARE

## 2022-09-12 VITALS
DIASTOLIC BLOOD PRESSURE: 78 MMHG | HEART RATE: 90 BPM | BODY MASS INDEX: 33.6 KG/M2 | SYSTOLIC BLOOD PRESSURE: 110 MMHG | HEIGHT: 64 IN | WEIGHT: 196.8 LBS | OXYGEN SATURATION: 97 %

## 2022-09-12 DIAGNOSIS — G56.40 COMPLEX REGIONAL PAIN SYNDROME TYPE 2, AFFECTING UNSPECIFIED SITE: ICD-10-CM

## 2022-09-12 DIAGNOSIS — E78.2 MIXED HYPERLIPIDEMIA: ICD-10-CM

## 2022-09-12 DIAGNOSIS — F43.10 PTSD (POST-TRAUMATIC STRESS DISORDER): ICD-10-CM

## 2022-09-12 DIAGNOSIS — G47.33 OSA (OBSTRUCTIVE SLEEP APNEA): ICD-10-CM

## 2022-09-12 DIAGNOSIS — F31.9 BIPOLAR 1 DISORDER (HCC): ICD-10-CM

## 2022-09-12 DIAGNOSIS — I10 ESSENTIAL HYPERTENSION: ICD-10-CM

## 2022-09-12 DIAGNOSIS — M51.36 DDD (DEGENERATIVE DISC DISEASE), LUMBAR: ICD-10-CM

## 2022-09-12 DIAGNOSIS — K22.70 BARRETT'S ESOPHAGUS WITHOUT DYSPLASIA: ICD-10-CM

## 2022-09-12 DIAGNOSIS — J30.9 ALLERGIC RHINITIS, UNSPECIFIED SEASONALITY, UNSPECIFIED TRIGGER: ICD-10-CM

## 2022-09-12 DIAGNOSIS — J44.9 CHRONIC OBSTRUCTIVE PULMONARY DISEASE, UNSPECIFIED COPD TYPE (HCC): Primary | ICD-10-CM

## 2022-09-12 DIAGNOSIS — K21.9 CHRONIC GERD: ICD-10-CM

## 2022-09-12 LAB
A/G RATIO: 1.8 (ref 1.1–2.2)
ALBUMIN SERPL-MCNC: 4.4 G/DL (ref 3.4–5)
ALP BLD-CCNC: 105 U/L (ref 40–129)
ALT SERPL-CCNC: 17 U/L (ref 10–40)
ANION GAP SERPL CALCULATED.3IONS-SCNC: 14 MMOL/L (ref 3–16)
AST SERPL-CCNC: 14 U/L (ref 15–37)
BILIRUB SERPL-MCNC: <0.2 MG/DL (ref 0–1)
BUN BLDV-MCNC: 16 MG/DL (ref 7–20)
CALCIUM SERPL-MCNC: 10.2 MG/DL (ref 8.3–10.6)
CHLORIDE BLD-SCNC: 106 MMOL/L (ref 99–110)
CHOLESTEROL, TOTAL: 208 MG/DL (ref 0–199)
CO2: 23 MMOL/L (ref 21–32)
CREAT SERPL-MCNC: 0.8 MG/DL (ref 0.6–1.1)
GFR AFRICAN AMERICAN: >60
GFR NON-AFRICAN AMERICAN: >60
GLUCOSE BLD-MCNC: 95 MG/DL (ref 70–99)
HDLC SERPL-MCNC: 33 MG/DL (ref 40–60)
LDL CHOLESTEROL CALCULATED: 123 MG/DL
POTASSIUM SERPL-SCNC: 4.7 MMOL/L (ref 3.5–5.1)
SODIUM BLD-SCNC: 143 MMOL/L (ref 136–145)
T4 FREE: 1.1 NG/DL (ref 0.9–1.8)
TOTAL PROTEIN: 6.8 G/DL (ref 6.4–8.2)
TRIGL SERPL-MCNC: 258 MG/DL (ref 0–150)
TSH SERPL DL<=0.05 MIU/L-ACNC: 0.78 UIU/ML (ref 0.27–4.2)
VITAMIN B-12: 246 PG/ML (ref 211–911)
VLDLC SERPL CALC-MCNC: 52 MG/DL

## 2022-09-12 PROCEDURE — G8427 DOCREV CUR MEDS BY ELIG CLIN: HCPCS | Performed by: FAMILY MEDICINE

## 2022-09-12 PROCEDURE — 3023F SPIROM DOC REV: CPT | Performed by: FAMILY MEDICINE

## 2022-09-12 PROCEDURE — 99214 OFFICE O/P EST MOD 30 MIN: CPT | Performed by: FAMILY MEDICINE

## 2022-09-12 PROCEDURE — 4004F PT TOBACCO SCREEN RCVD TLK: CPT | Performed by: FAMILY MEDICINE

## 2022-09-12 PROCEDURE — 36415 COLL VENOUS BLD VENIPUNCTURE: CPT | Performed by: FAMILY MEDICINE

## 2022-09-12 PROCEDURE — G8417 CALC BMI ABV UP PARAM F/U: HCPCS | Performed by: FAMILY MEDICINE

## 2022-09-12 RX ORDER — CARVEDILOL 6.25 MG/1
6.25 TABLET ORAL 2 TIMES DAILY
Qty: 180 TABLET | Refills: 3 | Status: SHIPPED | OUTPATIENT
Start: 2022-09-12

## 2022-09-12 RX ORDER — LINACLOTIDE 290 UG/1
CAPSULE, GELATIN COATED ORAL
Qty: 90 CAPSULE | Refills: 3 | Status: SHIPPED | OUTPATIENT
Start: 2022-09-12

## 2022-09-12 RX ORDER — DOCUSATE SODIUM 100 MG/1
CAPSULE, LIQUID FILLED ORAL
Qty: 180 CAPSULE | Refills: 3 | Status: SHIPPED | OUTPATIENT
Start: 2022-09-12

## 2022-09-12 RX ORDER — ATORVASTATIN CALCIUM 10 MG/1
10 TABLET, FILM COATED ORAL DAILY
Qty: 90 TABLET | Refills: 3 | Status: SHIPPED | OUTPATIENT
Start: 2022-09-12

## 2022-09-12 RX ORDER — BUDESONIDE AND FORMOTEROL FUMARATE DIHYDRATE 160; 4.5 UG/1; UG/1
2 AEROSOL RESPIRATORY (INHALATION) 2 TIMES DAILY
Qty: 6 EACH | Refills: 5 | Status: SHIPPED | OUTPATIENT
Start: 2022-09-12 | End: 2022-12-11

## 2022-09-12 RX ORDER — FUROSEMIDE 40 MG/1
40 TABLET ORAL DAILY
Qty: 28 TABLET | Refills: 1 | Status: SHIPPED | OUTPATIENT
Start: 2022-09-12 | End: 2022-10-25

## 2022-09-12 RX ORDER — ALBUTEROL SULFATE 2.5 MG/3ML
2.5 SOLUTION RESPIRATORY (INHALATION) EVERY 4 HOURS PRN
Qty: 120 EACH | Refills: 11 | Status: SHIPPED | OUTPATIENT
Start: 2022-09-12

## 2022-09-12 RX ORDER — ASPIRIN 81 MG/1
81 TABLET ORAL DAILY
Qty: 90 TABLET | Refills: 3 | Status: SHIPPED | OUTPATIENT
Start: 2022-09-12

## 2022-09-12 RX ORDER — FLUTICASONE PROPIONATE 50 MCG
1 SPRAY, SUSPENSION (ML) NASAL DAILY
Qty: 16 G | Refills: 5 | Status: SHIPPED | OUTPATIENT
Start: 2022-09-12

## 2022-09-12 RX ORDER — OMEPRAZOLE 40 MG/1
40 CAPSULE, DELAYED RELEASE ORAL 2 TIMES DAILY
Qty: 180 CAPSULE | Refills: 3 | Status: SHIPPED | OUTPATIENT
Start: 2022-09-12

## 2022-09-12 RX ORDER — LEVOCETIRIZINE DIHYDROCHLORIDE 5 MG/1
5 TABLET, FILM COATED ORAL NIGHTLY
Qty: 90 TABLET | Refills: 3 | Status: SHIPPED | OUTPATIENT
Start: 2022-09-12

## 2022-09-12 RX ORDER — ALBUTEROL SULFATE 90 UG/1
2 AEROSOL, METERED RESPIRATORY (INHALATION) EVERY 6 HOURS PRN
Qty: 18 G | Refills: 3 | Status: SHIPPED | OUTPATIENT
Start: 2022-09-12

## 2022-09-12 RX ORDER — LAMOTRIGINE 25 MG/1
50 TABLET ORAL DAILY
Qty: 180 TABLET | Refills: 3 | Status: SHIPPED | OUTPATIENT
Start: 2022-09-12

## 2022-09-12 ASSESSMENT — ENCOUNTER SYMPTOMS
BACK PAIN: 1
COUGH: 1
WHEEZING: 1

## 2022-09-12 NOTE — PROGRESS NOTES
Chief Complaint   Patient presents with    Discuss Medications       HPI:  Jeremy Carrera is a 41 y.o. (: 1975) here today to discuss pain medications. HPI  Has started part time job in [address] Has been having diff getting to pain mgmt for rx as well. Desires closer pain mgmt. Working for [employer]. Being seen at pain mgmt for back issues and complex regional pain syndrome. Cpap overall helping energy level. Has been losing weight. Hx of asthma/copd. Has had to use inhalers and nebs more due to weather changes. Has been on current psych meds. Off klonopin. On ativan. O/w meds for mood near baseline. Sees psych at [facility] in [address] [place] [address]. Hx of ball's. Taking PPI. Plans to try going to daily. O/w chau well. Patient's medications, allergies, past medical, surgical, social and family histories were reviewed and updated as appropriate. ROS:  Review of Systems   Constitutional:  Negative for fever. Respiratory:  Positive for cough and wheezing. Musculoskeletal:  Positive for back pain and myalgias.          Microscopic Examination (no units)   Date Value   2019 YES     LDL Calculated (mg/dL)   Date Value   2021 84       Past Medical History:   Diagnosis Date    Arthritis     Asthma     Bipolar 1 disorder (HCC)     Chronic constipation     COPD (chronic obstructive pulmonary disease) (HCC)     CRPS (complex regional pain syndrome type II)     Dental abscess     Essential hypertension 2021    GERD (gastroesophageal reflux disease)     Hx of blood clots     hx of pos d dimer    Low back pain     Mixed hyperlipidemia 2021    Orthostatic hypotension     Osteoporosis     Peptic ulcer disease     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 (HCC)     Psychosis ([facility] [address]     PTSD (post-traumatic stress disorder)        Family History   Problem Relation Age of Onset    Cancer Mother     Cancer Father     Cancer Brother     Heart Disease Brother        Social History     Socioeconomic History    Marital status:      Spouse name: Not on file    Number of children: Not on file    Years of education: Not on file    Highest education level: Not on file   Occupational History    Not on file   Tobacco Use    Smoking status: Every Day     Packs/day: 0.50     Types: Cigarettes     Start date: 1/28/1994    Smokeless tobacco: Never    Tobacco comments:     Currently trying to quit   Vaping Use    Vaping Use: Never used   Substance and Sexual Activity    Alcohol use: No    Drug use: Not Currently     Types: Marijuana Tanisha Lux)    Sexual activity: Not Currently   Other Topics Concern    Not on file   Social History Narrative    Not on file     Social Determinants of Health     Financial Resource Strain: Not on file   Food Insecurity: No Food Insecurity    Worried About Running Out of Food in the Last Year: Never true    Ran Out of Food in the Last Year: Never true   Transportation Needs: Unmet Transportation Needs    Lack of Transportation (Medical): Yes    Lack of Transportation (Non-Medical): Not on file   Physical Activity: Not on file   Stress: Not on file   Social Connections: Not on file   Intimate Partner Violence: Not on file   Housing Stability: Not on file       Prior to Visit Medications    Medication Sig Taking?  Authorizing Provider   aspirin 81 MG EC tablet Take 1 tablet by mouth daily Yes Lashon Menchaca MD   carvedilol (COREG) 6.25 MG tablet Take 1 tablet by mouth 2 times daily Yes Lashon Menchaca MD   levocetirizine (XYZAL) 5 MG tablet Take 1 tablet by mouth nightly Yes Lashon Menchaca MD   atorvastatin (LIPITOR) 10 MG tablet Take 1 tablet by mouth daily Yes Lashon Menchaca MD   omeprazole (PRILOSEC) 40 MG delayed release capsule Take 1 capsule by mouth 2 times daily Yes Lashon Menchaca MD   albuterol (PROVENTIL) (2.5 MG/3ML) 0.083% nebulizer solution Take 3 mLs by nebulization every 4 hours as needed for Wheezing Yes Any Fontaine MD   lamoTRIgine (LAMICTAL) 25 MG tablet Take 2 tablets by mouth daily Yes Any Fontaine MD   tiotropium (SPIRIVA RESPIMAT) 2.5 MCG/ACT AERS inhaler INHALE 2 PUFFS DAILY Yes Any Fontaine MD   fluticasone (FLONASE) 50 MCG/ACT nasal spray 1 spray by Each Nostril route daily Yes Any Fontaine MD   albuterol sulfate HFA (PROVENTIL;VENTOLIN;PROAIR) 108 (90 Base) MCG/ACT inhaler Inhale 2 puffs into the lungs every 6 hours as needed for Shortness of Breath Yes Any Fontaine MD   furosemide (LASIX) 40 MG tablet Take 1 tablet by mouth daily Yes Any Fontaine MD   docusate sodium (COLACE) 100 MG capsule TAKE 1 CAPSULE BY MOUTH TWICE A DAY AS NEEDED Yes Any Fontaine MD   linaclotide (LINZESS) 290 MCG CAPS capsule TAKE 1 CAPSULE BY MOUTH DAILY ON A EMPTY STOMACH AT LEAST 30 MINUTES BEFORE 1ST MEAL Yes Any Fontaine MD   budesonide-formoterol (SYMBICORT) 160-4.5 MCG/ACT AERO Inhale 2 puffs into the lungs 2 times daily Yes Any Fontaine MD   LORazepam (ATIVAN) 2 MG tablet Take 2 mg by mouth in the morning and 2 mg at noon and 2 mg before bedtime. Yes Historical Provider, MD   Probiotic Product (DIGESTIVE ADVANTAGE PO) Take by mouth Yes Historical Provider, MD   APPLE CIDER VINEGAR PO Take by mouth Yes Historical Provider, MD   promethazine (PHENERGAN) 25 MG tablet Take 25 mg by mouth every 6 hours as needed for Nausea Yes Historical Provider, MD   pantoprazole sodium (PROTONIX) 40 MG PACK packet Take 40 mg by mouth daily Yes Historical Provider, MD   HYDROcodone-acetaminophen (NORCO) 7.5-325 MG per tablet Take 1 tablet by mouth every 6 hours as needed. Yes Historical Provider, MD   gabapentin (NEURONTIN) 300 MG capsule Take 600 mg by mouth 3 times daily.   Yes Historical Provider, MD   QUEtiapine (SEROQUEL) 25 MG tablet Take 25 mg by mouth daily as needed Yes Historical Provider, MD   OXYGEN Inhale 2 L into the lungs  Yes Historical Provider, MD   paliperidone (INVEGA) 9 MG extended release tablet Take 9 mg by mouth every morning Yes Historical Provider, MD   escitalopram (LEXAPRO) 20 MG tablet Take 20 mg by mouth daily Yes Historical Provider, MD   imiquimod (ALDARA) 5 % cream APPLY 1 PACKET TOPICALLY TO AFFECTED AREA ON BODY THREE TIMES WEEKLY AT BEDTIME AND LEAVE ON FOR 8 HOURS, THEN [address] OFF AS DIRECTED BY PRESCRIBER  Gary Jiang APRN - CNP       Allergies   Allergen Reactions    Clindamycin/Lincomycin Other (See Comments), Hives, Itching and Shortness Of Breath     Weakness, and dizziness  Eye and throat swelling       Penicillins Swelling and Other (See Comments)     Other reaction(s): anaphylaxis/angioedema, anaphylaxis/angioedema  Throat swelling  Eye and throat swelling   Eye and throat swelling      Amoxicillin     Citalopram Hydrobromide      She thinks it made her bp to elevate    Clavulanic Acid Nausea And Vomiting       OBJECTIVE:    /78   Pulse 90   Ht 5' 4\" (1.626 m)   Wt 196 lb 12.8 oz (89.3 kg)   LMP 12/18/2013   SpO2 97%   BMI 33.78 kg/m²     BP Readings from Last 2 Encounters:   09/12/22 110/78   09/02/22 112/79       Wt Readings from Last 3 Encounters:   09/12/22 196 lb 12.8 oz (89.3 kg)   09/02/22 199 lb 6.4 oz (90.4 kg)   08/09/22 200 lb (90.7 kg)       Physical Exam  Constitutional:       Appearance: She is well-developed. HENT:      Head: Normocephalic and atraumatic. Right Ear: External ear normal.      Left Ear: External ear normal.   Eyes:      Conjunctiva/sclera: Conjunctivae normal.   Neck:      Trachea: No tracheal deviation. Cardiovascular:      Rate and Rhythm: Normal rate and regular rhythm. Heart sounds: Normal heart sounds. Pulmonary:      Effort: Pulmonary effort is normal.      Breath sounds: Decreased breath sounds, wheezing and rhonchi present. Abdominal:      Palpations: Abdomen is soft. Tenderness: There is no abdominal tenderness. Skin:     General: Skin is warm and dry.    Neurological:      Mental Status: She is alert and oriented to person, place, and time. Psychiatric:         Mood and Affect: Mood is anxious. Behavior: Behavior normal.         ASSESSMENT/PLAN:    1. Chronic obstructive pulmonary disease, unspecified COPD type ([facility] [address]  Refill meds. Bryson well. Cont meds. - albuterol (PROVENTIL) (2.5 MG/3ML) 0.083% nebulizer solution; Take 3 mLs by nebulization every 4 hours as needed for Wheezing  Dispense: 120 each; Refill: 11  - tiotropium (SPIRIVA RESPIMAT) 2.5 MCG/ACT AERS inhaler; INHALE 2 PUFFS DAILY  Dispense: 4 g; Refill: 3  - albuterol sulfate HFA (PROVENTIL;VENTOLIN;PROAIR) 108 (90 Base) MCG/ACT inhaler; Inhale 2 puffs into the lungs every 6 hours as needed for Shortness of Breath  Dispense: 18 g; Refill: 3  - budesonide-formoterol (SYMBICORT) 160-4.5 MCG/ACT AERO; Inhale 2 puffs into the lungs 2 times daily  Dispense: 6 each; Refill: 5    2. Mixed hyperlipidemia  Rpt labs. Cont meds. - atorvastatin (LIPITOR) 10 MG tablet; Take 1 tablet by mouth daily  Dispense: 90 tablet; Refill: 3  - Lipid Panel  - Comprehensive Metabolic Panel  - TSH  - T4, Free    3. Essential hypertension  The current medical regimen is effective;  continue present plan and medications. - aspirin 81 MG EC tablet; Take 1 tablet by mouth daily  Dispense: 90 tablet; Refill: 3  - carvedilol (COREG) 6.25 MG tablet; Take 1 tablet by mouth 2 times daily  Dispense: 180 tablet; Refill: 3  - Vitamin B12    4. RUTH (obstructive sleep apnea)  Cont cpap. Overall feeling better    5. Allergic rhinitis, unspecified seasonality, unspecified trigger  Refill meds. 6. Chronic GERD  Refill meds. Hx of ball's. - omeprazole (PRILOSEC) 40 MG delayed release capsule; Take 1 capsule by mouth 2 times daily  Dispense: 180 capsule; Refill: 3  - Vitamin B12    7. Ball's esophagus without dysplasia  Refill meds. See above. F/u w/ GI as sched.      8. Complex regional pain syndrome type 2, affecting unspecified site  Desires closer pain mgmt.  Will plan to explore options. 9. Bipolar 1 disorder (HCC)  Cont meds. Doing fair    10. PTSD (post-traumatic stress disorder)  See above. F/u w/ psych as sched    11. DDD (degenerative disc disease), lumbar  See above re pain mgmt. Informed pt that I would not be prescribing her meds here.

## 2022-09-13 NOTE — RESULT ENCOUNTER NOTE
B12 is low. Could contribute to fatigue, numbness and tingling in the feet, memory issues. Recommend over-the-counter B12 1000 mcg daily. Thyroid labs normal.  Cholesterol mildly elevated. Triglycerides actually a little improved. We refilled her atorvastatin yesterday. Continue medication for now. Repeat B12, CMP, and cholesterol labs in 4 to 6 months.   CMP essentially normal

## 2022-09-15 ENCOUNTER — CARE COORDINATION (OUTPATIENT)
Dept: CARE COORDINATION | Age: 47
End: 2022-09-15

## 2022-09-16 ENCOUNTER — CARE COORDINATION (OUTPATIENT)
Dept: CARE COORDINATION | Age: 47
End: 2022-09-16

## 2022-09-19 ENCOUNTER — CARE COORDINATION (OUTPATIENT)
Dept: CARE COORDINATION | Age: 47
End: 2022-09-19

## 2022-09-19 NOTE — CARE COORDINATION
Attempted to contact patient for CC f/u call; left HIPPA compliant message and ACM contact information.

## 2022-09-23 ENCOUNTER — TELEPHONE (OUTPATIENT)
Dept: FAMILY MEDICINE CLINIC | Age: 47
End: 2022-09-23

## 2022-09-23 NOTE — TELEPHONE ENCOUNTER
Patient is calling asking for a note so that she can get HEAP benefits. The note just needs to say that she has COPD and uses Oxygen. She would like it faxed to [phone].

## 2022-09-23 NOTE — TELEPHONE ENCOUNTER
It does appear per her last pulmonology note that she was wearing oxygen. Known history of COPD. Also uses CPAP.   Okay to write letter

## 2022-09-26 ENCOUNTER — TELEPHONE (OUTPATIENT)
Dept: FAMILY MEDICINE CLINIC | Age: 47
End: 2022-09-26

## 2022-09-26 DIAGNOSIS — A63.0 GENITAL WARTS: Primary | ICD-10-CM

## 2022-09-26 NOTE — TELEPHONE ENCOUNTER
Appears she lives in [address] [place] [address]. I believe [facility] has gyn that comes to [address] [place] [address].   Could refer there

## 2022-09-26 NOTE — TELEPHONE ENCOUNTER
Pt called and requested a referral to an OB/GYN to get into to have surgery to remove warts.  Who would you recommend

## 2022-09-29 ENCOUNTER — TELEPHONE (OUTPATIENT)
Dept: FAMILY MEDICINE CLINIC | Age: 47
End: 2022-09-29

## 2022-09-29 NOTE — TELEPHONE ENCOUNTER
Pt left a message for on call provider. I called pt back and she states he has genital warts. Her labia's are burning and feels like scratches. I gave her the number to GYN in [facility] to call for an appt. Informed pt if she could not get in soon to call back.  Pt voiced understanding

## 2022-10-04 ENCOUNTER — TELEPHONE (OUTPATIENT)
Dept: FAMILY MEDICINE CLINIC | Age: 47
End: 2022-10-04

## 2022-10-04 DIAGNOSIS — I10 ESSENTIAL HYPERTENSION: Primary | ICD-10-CM

## 2022-10-04 DIAGNOSIS — L84 CALLUS OF FOOT: ICD-10-CM

## 2022-10-04 NOTE — TELEPHONE ENCOUNTER
Pt called. She's needing a referral for a cardiologist and a podiatrist that is close. Pt has a leaky heart valve and she said that she has really bad calluses on her feet; difficult to wear shoes. Is it OK to do referrals. She said that [facility] is fine.

## 2022-10-21 ENCOUNTER — OFFICE VISIT (OUTPATIENT)
Dept: FAMILY MEDICINE CLINIC | Age: 47
End: 2022-10-21
Payer: MEDICARE

## 2022-10-21 VITALS
HEART RATE: 96 BPM | BODY MASS INDEX: 33.85 KG/M2 | SYSTOLIC BLOOD PRESSURE: 112 MMHG | OXYGEN SATURATION: 97 % | DIASTOLIC BLOOD PRESSURE: 70 MMHG | WEIGHT: 197.2 LBS

## 2022-10-21 DIAGNOSIS — H92.02 LEFT EAR PAIN: ICD-10-CM

## 2022-10-21 DIAGNOSIS — J01.90 ACUTE BACTERIAL SINUSITIS: ICD-10-CM

## 2022-10-21 DIAGNOSIS — B96.89 ACUTE BACTERIAL SINUSITIS: ICD-10-CM

## 2022-10-21 DIAGNOSIS — J44.1 COPD EXACERBATION (HCC): Primary | ICD-10-CM

## 2022-10-21 DIAGNOSIS — G47.33 OSA (OBSTRUCTIVE SLEEP APNEA): ICD-10-CM

## 2022-10-21 PROCEDURE — G8484 FLU IMMUNIZE NO ADMIN: HCPCS | Performed by: FAMILY MEDICINE

## 2022-10-21 PROCEDURE — 3023F SPIROM DOC REV: CPT | Performed by: FAMILY MEDICINE

## 2022-10-21 PROCEDURE — 4004F PT TOBACCO SCREEN RCVD TLK: CPT | Performed by: FAMILY MEDICINE

## 2022-10-21 PROCEDURE — G8417 CALC BMI ABV UP PARAM F/U: HCPCS | Performed by: FAMILY MEDICINE

## 2022-10-21 PROCEDURE — G8427 DOCREV CUR MEDS BY ELIG CLIN: HCPCS | Performed by: FAMILY MEDICINE

## 2022-10-21 PROCEDURE — 99214 OFFICE O/P EST MOD 30 MIN: CPT | Performed by: FAMILY MEDICINE

## 2022-10-21 RX ORDER — PROMETHAZINE HYDROCHLORIDE 25 MG/1
25 TABLET ORAL EVERY 8 HOURS PRN
Qty: 20 TABLET | Refills: 0 | Status: SHIPPED | OUTPATIENT
Start: 2022-10-21

## 2022-10-21 RX ORDER — PREDNISONE 20 MG/1
40 TABLET ORAL DAILY
Qty: 10 TABLET | Refills: 0 | Status: SHIPPED | OUTPATIENT
Start: 2022-10-21 | End: 2022-10-26

## 2022-10-21 RX ORDER — CEFDINIR 300 MG/1
300 CAPSULE ORAL 2 TIMES DAILY
Qty: 14 CAPSULE | Refills: 0 | Status: SHIPPED | OUTPATIENT
Start: 2022-10-21 | End: 2022-10-28

## 2022-10-21 ASSESSMENT — ENCOUNTER SYMPTOMS
COUGH: 1
SHORTNESS OF BREATH: 1

## 2022-10-25 ENCOUNTER — CARE COORDINATION (OUTPATIENT)
Dept: CARE COORDINATION | Age: 47
End: 2022-10-25

## 2022-10-25 RX ORDER — FUROSEMIDE 40 MG/1
40 TABLET ORAL DAILY
Qty: 28 TABLET | Refills: 1 | Status: SHIPPED | OUTPATIENT
Start: 2022-10-25

## 2022-10-25 NOTE — CARE COORDINATION
Ambulatory Care Coordination Note  10/25/2022    ACC: Velia Caballero, RN    Summary:  Patient reports she did not know antibiotic was prescribed for ear infection  This ACM contacted her pharmacy and spoke with Bhupendra Mccormack  She reports the Omnicef 300 mg BID for 7 day prescription was sent out on Friday 10/21/22  Bhupendra Mccormack checked the tracking and discovered medication is due to arrive tomorrow on 10/26/2022  Bhupendra Mccormack further reports medications usually arrive the next business day or the day after but they cannot promise specific delivery date; patient notified    Patient will notify PCP office if she does not receive medication tomorrow and if symptoms do not improve within a few days of starting antibiotic    Challenges to be reviewed by the provider   Additional needs identified to be addressed with provider Yes    General Assessment    Do you have any symptoms that are causing concern?: Yes  Progression since Onset: Gradually Worsening  Reported Symptoms: Pain (Comment: Patient c/o increasing pain in AS along with yellow drainage)                     FYI:  Reviewed medication: Omnicef with patient  Reviewed Epic clinical references Ear Infection: Care Instructions  Care Coordination Interventions    Referral from Primary Care Provider: No  Suggested Interventions and Community Resources  [facility] Completed  Transportation Support: Completed          Goals Addressed    None         Prior to Admission medications    Medication Sig Start Date End Date Taking?  Authorizing Provider   furosemide (LASIX) 40 MG tablet TAKE 1 TABLET BY MOUTH DAILY 10/25/22   Bennett Galo, APRN - CNP   promethazine (PHENERGAN) 25 MG tablet Take 1 tablet by mouth every 8 hours as needed for Nausea 10/21/22   Author MD Petty   predniSONE (DELTASONE) 20 MG tablet Take 2 tablets by mouth daily for 5 days 10/21/22 10/26/22  Author Petty MD   cefdinir (OMNICEF) 300 MG capsule Take 1 capsule by mouth 2 times daily for 7 days 10/21/22 10/28/22  Lyric Reveles MD   aspirin 81 MG EC tablet Take 1 tablet by mouth daily 9/12/22   Lyric Reveles MD   carvedilol (COREG) 6.25 MG tablet Take 1 tablet by mouth 2 times daily 9/12/22   Lyric Reveles MD   levocetirizine [place]ze) 5 MG tablet Take 1 tablet by mouth nightly 9/12/22   Lyric Reveles MD   atorvastatin (LIPITOR) 10 MG tablet Take 1 tablet by mouth daily 9/12/22   Lyric Reveles MD   omeprazole (PRILOSEC) 40 MG delayed release capsule Take 1 capsule by mouth 2 times daily 9/12/22   Lyric Reveles MD   albuterol (PROVENTIL) (2.5 MG/3ML) 0.083% nebulizer solution Take 3 mLs by nebulization every 4 hours as needed for Wheezing 9/12/22   Lyric Reveles MD   lamoTRIgine (LAMICTAL) 25 MG tablet Take 2 tablets by mouth daily 9/12/22   Lyric Reveles MD   tiotropium (SPIRIVA RESPIMAT) 2.5 MCG/ACT AERS inhaler INHALE 2 PUFFS DAILY 9/12/22   Lyric Reveles MD   fluticasone [facility]) 50 MCG/ACT nasal spray 1 spray by Each Nostril route daily 9/12/22   Lyric Reveles MD   albuterol sulfate HFA (PROVENTIL;VENTOLIN;PROAIR) 108 (90 Base) MCG/ACT inhaler Inhale 2 puffs into the lungs every 6 hours as needed for Shortness of Breath 9/12/22   Lyric Reveles MD   docusate sodium (COLACE) 100 MG capsule TAKE 1 CAPSULE BY MOUTH TWICE A DAY AS NEEDED 9/12/22   Lyric Reveles MD   linaclotide [facility]) 290 MCG CAPS capsule TAKE 1 CAPSULE BY MOUTH DAILY ON A EMPTY STOMACH AT LEAST 30 MINUTES BEFORE 1ST MEAL 9/12/22   Lyric Reveles MD   budesonide-formoterol [facility]) 160-4.5 MCG/ACT AERO Inhale 2 puffs into the lungs 2 times daily 9/12/22 12/11/22  Lyric Reveles MD   LORazepam (ATIVAN) 2 MG tablet Take 2 mg by mouth in the morning and 2 mg at noon and 2 mg before bedtime.     Historical Provider, MD   Probiotic Product (DIGESTIVE ADVANTAGE PO) Take by mouth    Historical Provider, MD   APPLE CIDER VINEGAR PO Take by mouth    Historical Provider, MD   imiquimod (ALDARA) 5 % cream APPLY 1 PACKET TOPICALLY TO AFFECTED AREA ON BODY THREE TIMES WEEKLY AT BEDTIME AND LEAVE ON FOR 8 HOURS, THEN [address] OFF AS DIRECTED BY PRESCRIBER 6/9/22   BELA Santoro - CNP   pantoprazole sodium (PROTONIX) 40 MG PACK packet Take 40 mg by mouth daily    Historical Provider, MD   gabapentin (NEURONTIN) 300 MG capsule Take 600 mg by mouth 3 times daily.   2/25/21   Historical Provider, MD   QUEtiapine (SEROQUEL) 25 MG tablet Take 25 mg by mouth daily as needed    Historical Provider, MD   OXYGEN Inhale 2 L into the lungs     Historical Provider, MD   paliperidone (INVEGA) 9 MG extended release tablet Take 9 mg by mouth every morning    Historical Provider, MD   escitalopram (LEXAPRO) 20 MG tablet Take 20 mg by mouth daily    Historical Provider, MD       Future Appointments   Date Time Provider Kana Mcmullen   12/13/2022 10:00 AM Elaina Campbell MD AND [facility]

## 2022-11-03 DIAGNOSIS — J44.1 COPD EXACERBATION (HCC): Primary | ICD-10-CM

## 2022-11-03 DIAGNOSIS — J44.9 CHRONIC OBSTRUCTIVE PULMONARY DISEASE, UNSPECIFIED COPD TYPE (HCC): ICD-10-CM

## 2022-11-03 RX ORDER — ALBUTEROL SULFATE 90 UG/1
2 POWDER, METERED RESPIRATORY (INHALATION) EVERY 6 HOURS PRN
Qty: 3 EACH | Refills: 3 | Status: SHIPPED | OUTPATIENT
Start: 2022-11-03

## 2022-11-03 NOTE — TELEPHONE ENCOUNTER
Pharmacy called. Ventolin Inh has been discontinued. Ins will pay for a ProAir Respiclick inhaler, 2 puffs q6h prn for SOB. Pls [place] new [id number].

## 2022-11-21 ENCOUNTER — OFFICE VISIT (OUTPATIENT)
Dept: FAMILY MEDICINE CLINIC | Age: 47
End: 2022-11-21
Payer: MEDICARE

## 2022-11-21 VITALS
DIASTOLIC BLOOD PRESSURE: 70 MMHG | WEIGHT: 200 LBS | BODY MASS INDEX: 34.33 KG/M2 | OXYGEN SATURATION: 96 % | HEART RATE: 108 BPM | SYSTOLIC BLOOD PRESSURE: 98 MMHG

## 2022-11-21 DIAGNOSIS — Z23 NEED FOR INFLUENZA VACCINATION: ICD-10-CM

## 2022-11-21 DIAGNOSIS — J30.2 SEASONAL ALLERGIES: ICD-10-CM

## 2022-11-21 DIAGNOSIS — R79.89 ABNORMAL CBC: ICD-10-CM

## 2022-11-21 DIAGNOSIS — Z09 HOSPITAL DISCHARGE FOLLOW-UP: Primary | ICD-10-CM

## 2022-11-21 DIAGNOSIS — R07.9 CHEST PAIN, UNSPECIFIED TYPE: ICD-10-CM

## 2022-11-21 LAB
BASOPHILS ABSOLUTE: 0.1 K/UL (ref 0–0.2)
BASOPHILS RELATIVE PERCENT: 1 %
EOSINOPHILS ABSOLUTE: 0.4 K/UL (ref 0–0.6)
EOSINOPHILS RELATIVE PERCENT: 2.6 %
HCT VFR BLD CALC: 43.7 % (ref 36–48)
HEMATOLOGY PATH CONSULT: NO
HEMOGLOBIN: 14.5 G/DL (ref 12–16)
LYMPHOCYTES ABSOLUTE: 5.8 K/UL (ref 1–5.1)
LYMPHOCYTES RELATIVE PERCENT: 38.3 %
MCH RBC QN AUTO: 29.4 PG (ref 26–34)
MCHC RBC AUTO-ENTMCNC: 33.2 G/DL (ref 31–36)
MCV RBC AUTO: 88.5 FL (ref 80–100)
MONOCYTES ABSOLUTE: 0.7 K/UL (ref 0–1.3)
MONOCYTES RELATIVE PERCENT: 4.9 %
NEUTROPHILS ABSOLUTE: 8 K/UL (ref 1.7–7.7)
NEUTROPHILS RELATIVE PERCENT: 53.2 %
PDW BLD-RTO: 14.5 % (ref 12.4–15.4)
PLATELET # BLD: 290 K/UL (ref 135–450)
PMV BLD AUTO: 9 FL (ref 5–10.5)
RBC # BLD: 4.94 M/UL (ref 4–5.2)
WBC # BLD: 15.1 K/UL (ref 4–11)

## 2022-11-21 PROCEDURE — 4004F PT TOBACCO SCREEN RCVD TLK: CPT

## 2022-11-21 PROCEDURE — 3078F DIAST BP <80 MM HG: CPT

## 2022-11-21 PROCEDURE — G8417 CALC BMI ABV UP PARAM F/U: HCPCS

## 2022-11-21 PROCEDURE — 90674 CCIIV4 VAC NO PRSV 0.5 ML IM: CPT

## 2022-11-21 PROCEDURE — 99214 OFFICE O/P EST MOD 30 MIN: CPT

## 2022-11-21 PROCEDURE — G8427 DOCREV CUR MEDS BY ELIG CLIN: HCPCS

## 2022-11-21 PROCEDURE — 36415 COLL VENOUS BLD VENIPUNCTURE: CPT

## 2022-11-21 PROCEDURE — 3074F SYST BP LT 130 MM HG: CPT

## 2022-11-21 PROCEDURE — G8482 FLU IMMUNIZE ORDER/ADMIN: HCPCS

## 2022-11-21 PROCEDURE — G0008 ADMIN INFLUENZA VIRUS VAC: HCPCS

## 2022-11-21 RX ORDER — FLUTICASONE PROPIONATE 50 MCG
1 SPRAY, SUSPENSION (ML) NASAL DAILY
Qty: 16 G | Refills: 5 | Status: SHIPPED | OUTPATIENT
Start: 2022-11-21

## 2022-11-21 RX ORDER — LEVOCETIRIZINE DIHYDROCHLORIDE 5 MG/1
5 TABLET, FILM COATED ORAL NIGHTLY
Qty: 90 TABLET | Refills: 3 | Status: SHIPPED | OUTPATIENT
Start: 2022-11-21

## 2022-11-21 ASSESSMENT — ENCOUNTER SYMPTOMS
GASTROINTESTINAL NEGATIVE: 1
WHEEZING: 1
COUGH: 0
SHORTNESS OF BREATH: 0

## 2022-11-21 NOTE — PROGRESS NOTES
Chief Complaint   Patient presents with    ED Follow-up       HPI:  Princella Harada is a 41 y.o. (: 1975) here today   for ER follow-up. Patient was seen at [facility] AT [facility] for chest pain on 2022. Do not have notes for viewing but can see labs and CTA. Patient had CTA of the chest which showed no pulmonary thromboembolism, aortic aneurysm or dissection. No peripheral lung mass, focal lung consolidation or effusions noted. There was mild bibasilar dependent atelectasis noted. Small subcentimeter pretracheal, left hilar and subcarinal lymph node noted probably reactive. Troponin negative. CMP, CK, lipase essentially normal.  CBC had some elevation in WBCs at 13.6. Was discharged home and advised to follow up with PCP within 3 days. Still today pt c/o wheezing. Was off anxiety meds at that time. Is back on anxiety meds and chest pain is better. [facility] has been having ear pains x2-3 months. Is not currenlty on flonase and Xyzal. States is out of meds. [facility] is due for colonoscopy. Will call GI specialist to have done. Patient's medications, allergies, past medical, surgical, social and family histories were reviewed and updated asappropriate. ROS:  Review of Systems   Constitutional: Negative. HENT: Negative. Respiratory:  Positive for wheezing. Negative for cough and shortness of breath. Cardiovascular: Negative. Gastrointestinal: Negative. Neurological: Negative. Psychiatric/Behavioral: Negative. Prior to Visit Medications    Medication Sig Taking?  Authorizing Provider   levocetirizine (XYZAL) 5 MG tablet Take 1 tablet by mouth nightly Yes BELA Santoro CNP   fluticasone (FLONASE) 50 MCG/ACT nasal spray 1 spray by Each Nostril route daily Yes BELA Santoro CNP   albuterol sulfate (PROAIR RESPICLICK) 170 (90 Base) MCG/ACT aerosol powder inhalation Inhale 2 puffs into the lungs every 6 hours as needed for Shortness of Breath Yes Iftikhar Peterson MD   furosemide (LASIX) 40 MG tablet TAKE 1 TABLET BY MOUTH DAILY Yes BELA Pitt CNP   promethazine (PHENERGAN) 25 MG tablet Take 1 tablet by mouth every 8 hours as needed for Nausea Yes Iftikhar Peterson MD   aspirin 81 MG EC tablet Take 1 tablet by mouth daily Yes Iftikhar Peterson MD   carvedilol (COREG) 6.25 MG tablet Take 1 tablet by mouth 2 times daily Yes Iftikhar Peterson MD   atorvastatin (LIPITOR) 10 MG tablet Take 1 tablet by mouth daily Yes Iftikhar Peterson MD   omeprazole (PRILOSEC) 40 MG delayed release capsule Take 1 capsule by mouth 2 times daily Yes Iftikhar Peterson MD   albuterol (PROVENTIL) (2.5 MG/3ML) 0.083% nebulizer solution Take 3 mLs by nebulization every 4 hours as needed for Wheezing Yes Iftikhar Peterson MD   lamoTRIgine (LAMICTAL) 25 MG tablet Take 2 tablets by mouth daily Yes Iftikhar Peterson MD   tiotropium (SPIRIVA RESPIMAT) 2.5 MCG/ACT AERS inhaler INHALE 2 PUFFS DAILY Yes Iftikhar Peterson MD   albuterol sulfate HFA (PROVENTIL;VENTOLIN;PROAIR) 108 (90 Base) MCG/ACT inhaler Inhale 2 puffs into the lungs every 6 hours as needed for Shortness of Breath Yes Iftikhar Peterson MD   docusate sodium (COLACE) 100 MG capsule TAKE 1 CAPSULE BY MOUTH TWICE A DAY AS NEEDED Yes Iftikhar Peterson MD   linaclotide (LINZESS) 290 MCG CAPS capsule TAKE 1 CAPSULE BY MOUTH DAILY ON A EMPTY STOMACH AT LEAST 30 MINUTES BEFORE 1ST MEAL Yes Iftikhar Peterson MD   budesonide-formoterol (SYMBICORT) 160-4.5 MCG/ACT AERO Inhale 2 puffs into the lungs 2 times daily Yes Iftikhar Peterson MD   LORazepam (ATIVAN) 2 MG tablet Take 2 mg by mouth in the morning and 2 mg at noon and 2 mg before bedtime.  Yes Historical Provider, MD   Probiotic Product (DIGESTIVE ADVANTAGE PO) Take by mouth Yes Historical Provider, MD   APPLE CIDER VINEGAR PO Take by mouth Yes Historical Provider, MD   imiquimod (ALDARA) 5 % cream APPLY 1 PACKET TOPICALLY TO AFFECTED AREA ON BODY THREE TIMES WEEKLY AT BEDTIME AND LEAVE ON FOR 8 HOURS, THEN [address] OFF AS DIRECTED BY PRESCRIBER Yes Lacey Louise, APRN - CNP   pantoprazole sodium (PROTONIX) 40 MG PACK packet Take 40 mg by mouth daily Yes Historical Provider, MD   gabapentin (NEURONTIN) 300 MG capsule Take 600 mg by mouth 3 times daily. Yes Historical Provider, MD   QUEtiapine (SEROQUEL) 25 MG tablet Take 25 mg by mouth daily as needed Yes Historical Provider, MD   OXYGEN Inhale 2 L into the lungs  Yes Historical Provider, MD   paliperidone (INVEGA) 9 MG extended release tablet Take 9 mg by mouth every morning Yes Historical Provider, MD   escitalopram (LEXAPRO) 20 MG tablet Take 20 mg by mouth daily Yes Historical Provider, MD       Allergies   Allergen Reactions    Clindamycin/Lincomycin Other (See Comments), Hives, Itching and Shortness Of Breath     Weakness, and dizziness  Eye and throat swelling       Penicillins Swelling and Other (See Comments)     Other reaction(s): anaphylaxis/angioedema, anaphylaxis/angioedema  Throat swelling  Eye and throat swelling   Eye and throat swelling      Amoxicillin     Citalopram Hydrobromide      She thinks it made her bp to elevate    Clavulanic Acid Nausea And Vomiting       OBJECTIVE:    BP 98/70   Pulse (!) 108   Wt 200 lb (90.7 kg)   LMP 12/18/2013   SpO2 96%   BMI 34.33 kg/m²     BP Readings from Last 2 Encounters:   11/21/22 98/70   10/21/22 112/70       Wt Readings from Last 3 Encounters:   11/21/22 200 lb (90.7 kg)   10/21/22 197 lb 3.2 oz (89.4 kg)   09/12/22 196 lb 12.8 oz (89.3 kg)       Physical Exam  Constitutional:       Appearance: Normal appearance. HENT:      Head: Normocephalic and atraumatic. Right Ear: Tympanic membrane, ear canal and external ear normal. There is no impacted cerumen. Left Ear: Tympanic membrane, ear canal and external ear normal. There is no impacted cerumen. Nose: Congestion present. No rhinorrhea. Cardiovascular:      Rate and Rhythm: Normal rate and regular rhythm. Pulses: Normal pulses. Heart sounds: Normal heart sounds. Pulmonary:      Effort: Pulmonary effort is normal.      Breath sounds: Normal breath sounds. No wheezing. Abdominal:      General: Bowel sounds are normal.   Musculoskeletal:         General: Normal range of motion. Skin:     General: Skin is warm. Capillary Refill: Capillary refill takes less than 2 seconds. Neurological:      General: No focal deficit present. Mental Status: She is alert and oriented to person, place, and time. Psychiatric:         Mood and Affect: Mood normal.         Behavior: Behavior normal.         ASSESSMENT/PLAN:    1. Hospital discharge follow-up  Is seen office today for ED follow-up. Was seen at [facility] for chest pain. Did not have notes available for viewing from ED. 2. Chest pain, unspecified type  Was seen in the ED for chest pain. Patient reports there is no diagnosis found behind reasoning behind her chest pain. Patient is back on her anxiety medication and states her chest pain has subsided after starting back on medication. It is possible symptoms were related to anxiety. CTA of the chest was negative in the ED. 3. Abnormal CBC  Patient had elevated WBCs in the ED. We will repeat CBC today. - CBC with Auto Differential    4. Seasonal allergies  Medication refill today. Patient complaining today of x2 to 3 months ear discomfort. Upon examination of the patient's ears today there was no noted abnormalities to the canal or tympanic membrane. Do suspect symptoms are related to being off of Xyzal and Flonase x2 to 3 months. Medication refilled today and patient was advised to resume back on medication. Follow-up with office if symptoms fail to improve or worsen. - levocetirizine (XYZAL) 5 MG tablet; Take 1 tablet by mouth nightly  Dispense: 90 tablet;  Refill: 3  - fluticasone (FLONASE) 50 MCG/ACT nasal spray; 1 spray by Each Nostril route daily  Dispense: 16 g; Refill: 5    5. Need for influenza vaccination  Administered in office today  - Influenza, FLUCELVAX, (age 10 mo+), IM, Preservative Free, 0.5 mL    30 min spent on pt care. This document was prepared by a combination of typing and transcription through a voice recognition software.     Bibiana Cruz, BELA - CNP

## 2022-12-02 ENCOUNTER — CARE COORDINATION (OUTPATIENT)
Dept: CARE COORDINATION | Age: 47
End: 2022-12-02

## 2022-12-22 DIAGNOSIS — J44.9 CHRONIC OBSTRUCTIVE PULMONARY DISEASE, UNSPECIFIED COPD TYPE (HCC): ICD-10-CM

## 2022-12-22 RX ORDER — FUROSEMIDE 40 MG/1
40 TABLET ORAL DAILY
Qty: 28 TABLET | Refills: 1 | Status: SHIPPED | OUTPATIENT
Start: 2022-12-22

## 2022-12-24 ENCOUNTER — APPOINTMENT (OUTPATIENT)
Dept: GENERAL RADIOLOGY | Age: 47
End: 2022-12-24
Payer: MEDICARE

## 2022-12-24 ENCOUNTER — APPOINTMENT (OUTPATIENT)
Dept: CT IMAGING | Age: 47
End: 2022-12-24
Payer: MEDICARE

## 2022-12-24 ENCOUNTER — HOSPITAL ENCOUNTER (EMERGENCY)
Age: 47
Discharge: HOME | End: 2022-12-24
Attending: STUDENT IN AN ORGANIZED HEALTH CARE EDUCATION/TRAINING PROGRAM
Payer: MEDICARE

## 2022-12-24 ENCOUNTER — HOSPITAL ENCOUNTER (EMERGENCY)
Age: 47
Discharge: HOME | End: 2022-12-25
Attending: EMERGENCY MEDICINE
Payer: MEDICARE

## 2022-12-24 VITALS
RESPIRATION RATE: 18 BRPM | OXYGEN SATURATION: 91 % | HEIGHT: 64 IN | SYSTOLIC BLOOD PRESSURE: 98 MMHG | HEART RATE: 98 BPM | DIASTOLIC BLOOD PRESSURE: 68 MMHG | WEIGHT: 200 LBS | TEMPERATURE: 98.4 F | BODY MASS INDEX: 34.15 KG/M2
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
## 2022-12-24 DIAGNOSIS — R05.9 COUGH, UNSPECIFIED TYPE: Primary | ICD-10-CM

## 2022-12-24 DIAGNOSIS — R07.81 RIB PAIN ON LEFT SIDE: ICD-10-CM

## 2022-12-24 DIAGNOSIS — S16.1XXA ACUTE STRAIN OF NECK MUSCLE, INITIAL ENCOUNTER: ICD-10-CM

## 2022-12-24 PROCEDURE — 3209999900 CT THORACIC SPINE TRAUMA RECONSTRUCTION

## 2022-12-24 PROCEDURE — 6370000000 HC RX 637 (ALT 250 FOR IP): Performed by: EMERGENCY MEDICINE

## 2022-12-24 PROCEDURE — 90715 TDAP VACCINE 7 YRS/> IM: CPT | Performed by: STUDENT IN AN ORGANIZED HEALTH CARE EDUCATION/TRAINING PROGRAM

## 2022-12-24 PROCEDURE — 71260 CT THORAX DX C+: CPT

## 2022-12-24 PROCEDURE — 6360000002 HC RX W HCPCS: Performed by: STUDENT IN AN ORGANIZED HEALTH CARE EDUCATION/TRAINING PROGRAM

## 2022-12-24 PROCEDURE — 70450 CT HEAD/BRAIN W/O DYE: CPT

## 2022-12-24 PROCEDURE — 6360000004 HC RX CONTRAST MEDICATION: Performed by: STUDENT IN AN ORGANIZED HEALTH CARE EDUCATION/TRAINING PROGRAM

## 2022-12-24 PROCEDURE — 96372 THER/PROPH/DIAG INJ SC/IM: CPT

## 2022-12-24 PROCEDURE — 96375 TX/PRO/DX INJ NEW DRUG ADDON: CPT

## 2022-12-24 PROCEDURE — 96374 THER/PROPH/DIAG INJ IV PUSH: CPT

## 2022-12-24 PROCEDURE — 72125 CT NECK SPINE W/O DYE: CPT

## 2022-12-24 PROCEDURE — 99283 EMERGENCY DEPT VISIT LOW MDM: CPT

## 2022-12-24 PROCEDURE — 71046 X-RAY EXAM CHEST 2 VIEWS: CPT

## 2022-12-24 PROCEDURE — 99285 EMERGENCY DEPT VISIT HI MDM: CPT

## 2022-12-24 PROCEDURE — 70486 CT MAXILLOFACIAL W/O DYE: CPT

## 2022-12-24 PROCEDURE — 90471 IMMUNIZATION ADMIN: CPT | Performed by: STUDENT IN AN ORGANIZED HEALTH CARE EDUCATION/TRAINING PROGRAM

## 2022-12-24 RX ORDER — NAPROXEN 250 MG/1
500 TABLET ORAL ONCE
Status: COMPLETED | OUTPATIENT
Start: 2022-12-24 | End: 2022-12-24

## 2022-12-24 RX ORDER — MORPHINE SULFATE 4 MG/ML
4 INJECTION, SOLUTION INTRAMUSCULAR; INTRAVENOUS ONCE
Status: COMPLETED | OUTPATIENT
Start: 2022-12-24 | End: 2022-12-24

## 2022-12-24 RX ORDER — ONDANSETRON 2 MG/ML
4 INJECTION INTRAMUSCULAR; INTRAVENOUS ONCE
Status: COMPLETED | OUTPATIENT
Start: 2022-12-24 | End: 2022-12-24

## 2022-12-24 RX ORDER — LIDOCAINE 4 G/G
1 PATCH TOPICAL ONCE
Status: DISCONTINUED | OUTPATIENT
Start: 2022-12-24 | End: 2022-12-25 | Stop reason: HOSPADM

## 2022-12-24 RX ORDER — BENZONATATE 100 MG/1
100 CAPSULE ORAL ONCE
Status: COMPLETED | OUTPATIENT
Start: 2022-12-24 | End: 2022-12-24

## 2022-12-24 RX ADMIN — ONDANSETRON HYDROCHLORIDE 4 MG: 2 INJECTION, SOLUTION INTRAMUSCULAR; INTRAVENOUS at 16:15

## 2022-12-24 RX ADMIN — NAPROXEN 500 MG: 250 TABLET ORAL at 23:52

## 2022-12-24 RX ADMIN — IOPAMIDOL 75 ML: 755 INJECTION, SOLUTION INTRAVENOUS at 17:11

## 2022-12-24 RX ADMIN — BENZONATATE 100 MG: 100 CAPSULE ORAL at 23:53

## 2022-12-24 RX ADMIN — MORPHINE SULFATE 4 MG: 4 INJECTION, SOLUTION INTRAMUSCULAR; INTRAVENOUS at 16:15

## 2022-12-24 RX ADMIN — TETANUS TOXOID, REDUCED DIPHTHERIA TOXOID AND ACELLULAR PERTUSSIS VACCINE, ADSORBED 0.5 ML: 5; 2.5; 8; 8; 2.5 SUSPENSION INTRAMUSCULAR at 16:16

## 2022-12-24 ASSESSMENT — PAIN DESCRIPTION - ORIENTATION
ORIENTATION: RIGHT

## 2022-12-24 ASSESSMENT — PAIN DESCRIPTION - FREQUENCY
FREQUENCY: CONTINUOUS

## 2022-12-24 ASSESSMENT — PAIN DESCRIPTION - ONSET
ONSET: SUDDEN
ONSET: SUDDEN

## 2022-12-24 ASSESSMENT — PAIN DESCRIPTION - DESCRIPTORS
DESCRIPTORS: DULL
DESCRIPTORS: PATIENT UNABLE TO DESCRIBE
DESCRIPTORS: ACHING

## 2022-12-24 ASSESSMENT — PAIN DESCRIPTION - PAIN TYPE
TYPE: ACUTE PAIN

## 2022-12-24 ASSESSMENT — PAIN SCALES - GENERAL
PAINLEVEL_OUTOF10: 3
PAINLEVEL_OUTOF10: 6
PAINLEVEL_OUTOF10: 6
PAINLEVEL_OUTOF10: 5

## 2022-12-24 ASSESSMENT — PAIN - FUNCTIONAL ASSESSMENT
PAIN_FUNCTIONAL_ASSESSMENT: 0-10
PAIN_FUNCTIONAL_ASSESSMENT: 0-10
PAIN_FUNCTIONAL_ASSESSMENT: PREVENTS OR INTERFERES SOME ACTIVE ACTIVITIES AND ADLS
PAIN_FUNCTIONAL_ASSESSMENT: PREVENTS OR INTERFERES SOME ACTIVE ACTIVITIES AND ADLS

## 2022-12-24 ASSESSMENT — PAIN DESCRIPTION - LOCATION
LOCATION: FACE;JAW
LOCATION: HEAD;JAW;NECK

## 2022-12-24 ASSESSMENT — LIFESTYLE VARIABLES: HOW OFTEN DO YOU HAVE A DRINK CONTAINING ALCOHOL: NEVER

## 2022-12-24 NOTE — ED NOTES
AVS provided and reviewed with the patient. The patient verbalized understanding of care at home, follow up care, and emergent symptoms to return for. No questions or concerns verbalized at this time. The patient is alert, oriented, stable, and ambulatory out of the department at the time of discharge.        Rom Palacios RN  12/24/22 1564
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
## 2022-12-24 NOTE — ED NOTES
Patient on the phone with [facility] at this time.       aMrgarita Ling, YULI  12/24/22 0171 Surgeon/Pathologist Verbiage (Will Incorporate Name Of Surgeon From Intro If Not Blank): operated in two distinct and integrated capacities as the surgeon and pathologist.
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
## 2022-12-25 VITALS
SYSTOLIC BLOOD PRESSURE: 106 MMHG | OXYGEN SATURATION: 99 % | BODY MASS INDEX: 35 KG/M2 | TEMPERATURE: 97 F | RESPIRATION RATE: 18 BRPM | WEIGHT: 205 LBS | HEART RATE: 98 BPM | HEIGHT: 64 IN | DIASTOLIC BLOOD PRESSURE: 64 MMHG
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
## 2022-12-27 ENCOUNTER — TELEPHONE (OUTPATIENT)
Dept: FAMILY MEDICINE CLINIC | Age: 47
End: 2022-12-27

## 2022-12-27 ENCOUNTER — CARE COORDINATION (OUTPATIENT)
Dept: CARE COORDINATION | Age: 47
End: 2022-12-27

## 2022-12-27 DIAGNOSIS — R05.9 COUGH, UNSPECIFIED TYPE: Primary | ICD-10-CM

## 2022-12-27 RX ORDER — CEFDINIR 300 MG/1
300 CAPSULE ORAL 2 TIMES DAILY
Qty: 20 CAPSULE | Refills: 0 | Status: SHIPPED | OUTPATIENT
Start: 2022-12-27 | End: 2023-01-06

## 2022-12-27 RX ORDER — AZITHROMYCIN 250 MG/1
250 TABLET, FILM COATED ORAL SEE ADMIN INSTRUCTIONS
Qty: 6 TABLET | Refills: 0 | Status: CANCELLED | OUTPATIENT
Start: 2022-12-27 | End: 2023-01-01

## 2022-12-27 RX ORDER — BENZONATATE 200 MG/1
200 CAPSULE ORAL 3 TIMES DAILY PRN
Qty: 30 CAPSULE | Refills: 0 | Status: SHIPPED | OUTPATIENT
Start: 2022-12-27 | End: 2023-01-03
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
## 2022-12-28 ENCOUNTER — TELEPHONE (OUTPATIENT)
Dept: FAMILY MEDICINE CLINIC | Age: 47
End: 2022-12-28

## 2022-12-28 NOTE — TELEPHONE ENCOUNTER
Allergic reaction to PCN and we gave her Omnicef. Never taken a Cephalosporin according to their records. Pls advise.

## 2022-12-30 ENCOUNTER — TELEPHONE (OUTPATIENT)
Dept: FAMILY MEDICINE CLINIC | Age: 47
End: 2022-12-30

## 2022-12-30 DIAGNOSIS — E04.1 THYROID NODULE: Primary | ICD-10-CM

## 2022-12-30 NOTE — TELEPHONE ENCOUNTER
Patient was seen in the ER this morning. She states that she had several tests that were abnormal and doesn't know what she needs to follow up on. Patient was told she has pneumonia and a thyroid nodule.

## 2022-12-30 NOTE — TELEPHONE ENCOUNTER
CT did not mention definite pneumonia. It did mention some groundglass infiltrates that were similar to prior CT. There is a possibility of an infectious etiology given her elevated white blood cell count. When she sent home with antibiotics? CT did mention a thyroid nodule.   Recommend thyroid ultrasound for further evaluation

## 2022-12-30 NOTE — TELEPHONE ENCOUNTER
Patient informed. She was prescribed antibiotics in the ER. Wants ultrasound scheduled at [facility].

## 2023-01-02 ENCOUNTER — TELEPHONE (OUTPATIENT)
Dept: FAMILY MEDICINE CLINIC | Age: 48
End: 2023-01-02

## 2023-01-02 DIAGNOSIS — R07.89 CHEST WALL PAIN: Primary | ICD-10-CM

## 2023-01-02 RX ORDER — IBUPROFEN 600 MG/1
600 TABLET ORAL 3 TIMES DAILY PRN
Qty: 30 TABLET | Refills: 0 | Status: SHIPPED | OUTPATIENT
Start: 2023-01-02
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
## 2023-01-03 ENCOUNTER — CARE COORDINATION (OUTPATIENT)
Dept: CARE COORDINATION | Age: 48
End: 2023-01-03

## 2023-01-03 NOTE — CARE COORDINATION
Ambulatory Care Coordination Note  1/3/2023    ACC: Chiki Cornell, YULI    Patient reports symptoms improving; denies any current needs. Reviewed the following:  Pneumonia Zones  Cough: Care Instructions  Medications  Patient reports she is active in My Chart  Sent via My Chart the following:  Pneumonia Zones  Cough: Care Instructions  Pneumonia: Self Care Video    Offered patient enrollment in the Remote Patient Monitoring (RPM) program for in-home monitoring:  Not reviewed during call today; plan to f/u later this week and will review at that time . COPD Assessment              Symptoms:     Symptom course: improving  Breathlessness: none  Increase use of rapid acting/rescue inhaled medications?: No  Change in chronic cough?:  (Comment: improving)  Change in sputum?:  (Comment: improving)  Sputum characteristics: Thick (Comment: gray)        Care Coordination Interventions    Referral from Primary Care Provider: No  Suggested Interventions and Community Resources  [facility] Completed  Transportation Support: Completed          Goals Addressed                   This Visit's Progress       Care Coordination     Conditions and Symptoms        I will follow my Zone Management tool to seek urgent or emergent care. Barriers: none  Plan for overcoming my barriers: N/A  Confidence: 10/10  Anticipated Goal Completion Date: 2/3/22                Prior to Admission medications    Medication Sig Start Date End Date Taking?  Authorizing Provider   ibuprofen (ADVIL;MOTRIN) 600 MG tablet Take 1 tablet by mouth 3 times daily as needed for Pain 1/2/23  Yes You Tavarez MD   benzonatate (TESSALON) 200 MG capsule Take 1 capsule by mouth 3 times daily as needed for Cough 12/27/22 1/3/23 Yes You Tavarez MD   cefdinir (OMNICEF) 300 MG capsule Take 1 capsule by mouth 2 times daily for 10 days 12/27/22 1/6/23 Yes You Tavarez MD   furosemide (LASIX) 40 MG tablet TAKE 1 TABLET BY MOUTH DAILY 12/22/22  Yes Steve Russo BELA Savage CNP   tiotropium (SPIRIVA RESPIMAT) 2.5 MCG/ACT AERS inhaler INHALE 2 PUFFS DAILY 12/22/22  Yes BELA Cotter CNP   levocetirizine (XYZAL) 5 MG tablet Take 1 tablet by mouth nightly 11/21/22  Yes BELA Cotter CNP   fluticasone Cori Mejia) 50 MCG/ACT nasal spray 1 spray by Each Nostril route daily 11/21/22  Yes BELA Cotter CNP   albuterol sulfate (PROAIR RESPICLICK) 347 (90 Base) MCG/ACT aerosol powder inhalation Inhale 2 puffs into the lungs every 6 hours as needed for Shortness of Breath 11/3/22  Yes Lashon Menchaca MD   promethazine (PHENERGAN) 25 MG tablet Take 1 tablet by mouth every 8 hours as needed for Nausea 10/21/22  Yes Lashon Menchaca MD   aspirin 81 MG EC tablet Take 1 tablet by mouth daily 9/12/22  Yes Lashon Menchaca MD   carvedilol (COREG) 6.25 MG tablet Take 1 tablet by mouth 2 times daily 9/12/22  Yes Lashon Menchaca MD   atorvastatin (LIPITOR) 10 MG tablet Take 1 tablet by mouth daily 9/12/22  Yes Lashon Menchaca MD   omeprazole (PRILOSEC) 40 MG delayed release capsule Take 1 capsule by mouth 2 times daily 9/12/22  Yes Lashon Menchaca MD   albuterol (PROVENTIL) (2.5 MG/3ML) 0.083% nebulizer solution Take 3 mLs by nebulization every 4 hours as needed for Wheezing 9/12/22  Yes Lashon Menchaca MD   lamoTRIgine (LAMICTAL) 25 MG tablet Take 2 tablets by mouth daily 9/12/22  Yes Lashon Menchaca MD   docusate sodium (COLACE) 100 MG capsule TAKE 1 CAPSULE BY MOUTH TWICE A DAY AS NEEDED 9/12/22  Yes Lashon Menchaca MD   linaclotide (LINZESS) 290 MCG CAPS capsule TAKE 1 CAPSULE BY MOUTH DAILY ON A EMPTY STOMACH AT LEAST 30 MINUTES BEFORE 1ST MEAL 9/12/22  Yes Lashon Menchaca MD   LORazepam (ATIVAN) 2 MG tablet Take 2 mg by mouth in the morning and 2 mg at noon and 2 mg before bedtime.    Yes Historical Provider, MD   Probiotic Product (DIGESTIVE ADVANTAGE PO) Take by mouth   Yes Historical Provider, MD   APPLE CIDER VINEGAR PO Take by mouth   Yes Historical Provider, MD   pantoprazole sodium (PROTONIX) 40 MG PACK packet Take 40 mg by mouth daily   Yes Historical Provider, MD   gabapentin (NEURONTIN) 300 MG capsule Take 300 mg by mouth 3 times daily.  2/25/21  Yes Historical Provider, MD   QUEtiapine (SEROQUEL) 25 MG tablet Take 200 mg by mouth at bedtime   Yes Historical Provider, MD   OXYGEN Inhale 2 L into the lungs    Yes Historical Provider, MD   paliperidone (INVEGA) 9 MG extended release tablet Take 9 mg by mouth every morning   Yes Historical Provider, MD   escitalopram (LEXAPRO) 20 MG tablet Take 20 mg by mouth daily   Yes Historical Provider, MD   albuterol sulfate HFA (PROVENTIL;VENTOLIN;PROAIR) 108 (90 Base) MCG/ACT inhaler Inhale 2 puffs into the lungs every 6 hours as needed for Shortness of Breath 9/12/22   Miranda Anaya MD   budesonide-formoterol [facility]) 160-4.5 MCG/ACT AERO Inhale 2 puffs into the lungs 2 times daily 9/12/22 12/11/22  Miranda Anaya MD   imiquimod (ALDARA) 5 % cream APPLY 1 PACKET TOPICALLY TO AFFECTED AREA ON BODY THREE TIMES WEEKLY AT BEDTIME AND LEAVE ON FOR 8 HOURS, THEN [address] OFF AS DIRECTED BY PRESCRIBER  Patient not taking: Reported on 1/3/2023 6/9/22   BELA Leblanc - CNP       Future Appointments   Date Time Provider Kana Mcmullen   1/24/2023 10:00 AM Brodie Leslie MD AND YAMEL NAVARRO

## 2023-01-04 ENCOUNTER — TELEPHONE (OUTPATIENT)
Dept: FAMILY MEDICINE CLINIC | Age: 48
End: 2023-01-04

## 2023-01-04 DIAGNOSIS — R93.89 ABNORMAL THYROID ULTRASOUND: ICD-10-CM

## 2023-01-04 DIAGNOSIS — E04.9 GOITER: Primary | ICD-10-CM

## 2023-01-04 NOTE — TELEPHONE ENCOUNTER
Pt called and asked if you will send order for nebulizer machine into [employer]. Please Advise.  Thank You

## 2023-01-04 NOTE — RESULT ENCOUNTER NOTE
Findings on the thyroid ultrasound showing multinodular goiter. There is an area to the right lobe that looks a little different. While this is likely related to above, and likely benign in nature, given the slight difference in appearance, would recommend ENT referral for evaluation to decide if ultrasound-guided biopsy may be necessary.   At a minimum, would recommend repeat ultrasound in 1 year

## 2023-01-12 DIAGNOSIS — I10 ESSENTIAL HYPERTENSION: ICD-10-CM

## 2023-01-12 DIAGNOSIS — E78.2 MIXED HYPERLIPIDEMIA: ICD-10-CM

## 2023-01-12 RX ORDER — ATORVASTATIN CALCIUM 10 MG/1
10 TABLET, FILM COATED ORAL DAILY
Qty: 90 TABLET | Refills: 3 | Status: SHIPPED | OUTPATIENT
Start: 2023-01-12

## 2023-01-12 RX ORDER — CARVEDILOL 6.25 MG/1
6.25 TABLET ORAL 2 TIMES DAILY
Qty: 180 TABLET | Refills: 3 | Status: SHIPPED | OUTPATIENT
Start: 2023-01-12

## 2023-01-20 ENCOUNTER — TELEPHONE (OUTPATIENT)
Dept: FAMILY MEDICINE CLINIC | Age: 48
End: 2023-01-20

## 2023-01-20 NOTE — TELEPHONE ENCOUNTER
Pt called in and wants to start getting the Pneumonia shot more often. Due to the fact that she gets it so much. She wants to know how often she can have one?

## 2023-01-24 ENCOUNTER — OFFICE VISIT (OUTPATIENT)
Dept: ENT CLINIC | Age: 48
End: 2023-01-24
Payer: MEDICARE

## 2023-01-24 VITALS
OXYGEN SATURATION: 99 % | HEART RATE: 95 BPM | BODY MASS INDEX: 35 KG/M2 | WEIGHT: 205 LBS | DIASTOLIC BLOOD PRESSURE: 81 MMHG | SYSTOLIC BLOOD PRESSURE: 115 MMHG | HEIGHT: 64 IN | TEMPERATURE: 97.5 F

## 2023-01-24 DIAGNOSIS — R13.14 PHARYNGOESOPHAGEAL DYSPHAGIA: Primary | ICD-10-CM

## 2023-01-24 DIAGNOSIS — E04.2 MULTINODULAR THYROID: ICD-10-CM

## 2023-01-24 PROCEDURE — 99204 OFFICE O/P NEW MOD 45 MIN: CPT | Performed by: OTOLARYNGOLOGY

## 2023-01-24 PROCEDURE — 3079F DIAST BP 80-89 MM HG: CPT | Performed by: OTOLARYNGOLOGY

## 2023-01-24 PROCEDURE — 31575 DIAGNOSTIC LARYNGOSCOPY: CPT | Performed by: OTOLARYNGOLOGY

## 2023-01-24 PROCEDURE — 3074F SYST BP LT 130 MM HG: CPT | Performed by: OTOLARYNGOLOGY

## 2023-01-24 ASSESSMENT — ENCOUNTER SYMPTOMS
WHEEZING: 0
SHORTNESS OF BREATH: 0
ABDOMINAL PAIN: 0
TROUBLE SWALLOWING: 1
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
## 2023-01-25 ENCOUNTER — OFFICE VISIT (OUTPATIENT)
Dept: PULMONOLOGY | Age: 48
End: 2023-01-25
Payer: MEDICARE

## 2023-01-25 VITALS
RESPIRATION RATE: 16 BRPM | BODY MASS INDEX: 34.83 KG/M2 | HEIGHT: 64 IN | OXYGEN SATURATION: 96 % | SYSTOLIC BLOOD PRESSURE: 110 MMHG | WEIGHT: 204 LBS | HEART RATE: 104 BPM | DIASTOLIC BLOOD PRESSURE: 74 MMHG | TEMPERATURE: 97.8 F

## 2023-01-25 DIAGNOSIS — R91.1 LUNG NODULE: ICD-10-CM

## 2023-01-25 DIAGNOSIS — J44.9 CHRONIC OBSTRUCTIVE PULMONARY DISEASE, UNSPECIFIED COPD TYPE (HCC): ICD-10-CM

## 2023-01-25 DIAGNOSIS — F17.200 CURRENT SMOKER: ICD-10-CM

## 2023-01-25 DIAGNOSIS — G47.33 OSA (OBSTRUCTIVE SLEEP APNEA): Primary | ICD-10-CM

## 2023-01-25 DIAGNOSIS — E66.01 CLASS 2 SEVERE OBESITY DUE TO EXCESS CALORIES WITH SERIOUS COMORBIDITY AND BODY MASS INDEX (BMI) OF 35.0 TO 35.9 IN ADULT (HCC): ICD-10-CM

## 2023-01-25 PROCEDURE — 99214 OFFICE O/P EST MOD 30 MIN: CPT | Performed by: INTERNAL MEDICINE

## 2023-01-25 PROCEDURE — 3078F DIAST BP <80 MM HG: CPT | Performed by: INTERNAL MEDICINE

## 2023-01-25 PROCEDURE — 3074F SYST BP LT 130 MM HG: CPT | Performed by: INTERNAL MEDICINE

## 2023-01-25 RX ORDER — NICOTINE 21 MG/24HR
1 PATCH, TRANSDERMAL 24 HOURS TRANSDERMAL DAILY
Qty: 42 PATCH | Refills: 0 | Status: SHIPPED | OUTPATIENT
Start: 2023-01-25 | End: 2023-03-08
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
## 2023-01-25 NOTE — PATIENT INSTRUCTIONS
Remember to bring a list of pulmonary medications and any CPAP or BiPAP machines to your next appointment with the office. Please keep all of your future appointments scheduled by [facility] Pulmonary office. Out of respect for other patients and providers, you may be asked to reschedule your appointment if you arrive later than your scheduled appointment time. Appointments cancelled less than 24hrs in advance will be considered a no show. Patients with three missed appointments within 1 year or four missed appointments within 2 years can be dismissed from the practice. Please be aware that our physicians are required to work in the Intensive Care Unit at [facility].  Your appointment may need to be rescheduled if they are designated to work during your appointment time. You may receive a survey regarding the care you received during your visit. Your input is valuable to us. We encourage you to complete and return your survey. We hope you will choose us in the future for your healthcare needs. Pt instructed of all future appointment dates & times, including radiology, labs, procedures & referrals. If procedures were scheduled preparation instructions provided. Instructions on future appointments with [facility] Pulmonary were given. Sleep center will call to schedule you sleep study.  If you have any question their phone  Number is [phone]

## 2023-01-25 NOTE — LETTER
[address] [facility] Pulmonary Critical Care and Sleep  [address] [place] [address]  Phone: [phone]  Fax: [phone]    Kayla Lawson MD    January 25, 2023     Stella Mcclain, [address] [facility] [address]    Patient: Kim Sullivan   MR Number: [id number]   YOB: 1975   Date of Visit: 1/25/2023       Dear Stella Mcclain: Thank you for referring Kim Sullivan to me for evaluation/treatment. Below are the relevant portions of my assessment and plan of care. If you have questions, please do not hesitate to call me. I look forward to following [facility] along with you.     Sincerely,      Kayla Lawson MD

## 2023-01-25 NOTE — LETTER
[address] [facility] Pulmonary Critical Care and Sleep  [address] [address]  Phone: [phone]  Fax: [phone]           David Kenney MD      January 25, 2023     Patient: Gregory Xiao   MR Number: [id number]   YOB: 1975   Date of Visit: 1/25/2023       Dear Dr. Nat Gutiérrez: Thank you for referring Gregory Xiao to me for evaluation/treatment. Below are the relevant portions of my assessment and plan of care. If you have questions, please do not hesitate to call me. I look forward to following Angel Jorgensen along with you.     Sincerely,        David Kenney MD    CC providers:  Ashish Wisdom [address] [place] [id number]  [address] [facility] [address]

## 2023-01-25 NOTE — PROGRESS NOTES
MA Communication:   The following orders are received by verbal communication from Leeanna Lassiter MD    Orders include:    3 month follow   CT same day  HST w/pap titration

## 2023-01-25 NOTE — LETTER
.  [address] [address]  Dept: [phone]        1/25/2023      Patient:    Mable Serrano  YOB: 1975  Date of visit :1/25/2023      To Whom it May concern:        Mable Serrano, was seen today at my office on  1/25/2023    If you have any question or concerns,please don't hesitate to call        Sincerely;         Jorge Pope MD

## 2023-01-25 NOTE — LETTER
[address] [facility] Pulmonary Critical Care and Sleep  [address] [place] [address]  Phone: [phone]  Fax: [phone]    Ladonna Rivera MD    January 25, 2023     Shaji Kimble, [address] [facility] [address]    Patient: Isaiah Heimlich   MR Number: [id number]   YOB: 1975   Date of Visit: 1/25/2023       Dear Shaji Kimble: Thank you for referring Isaiah Heimlich to me for evaluation/treatment. Below are the relevant portions of my assessment and plan of care. If you have questions, please do not hesitate to call me. I look forward to following [facility] along with you.     Sincerely,      Ladonna Rivera MD

## 2023-01-30 ENCOUNTER — OFFICE VISIT (OUTPATIENT)
Dept: FAMILY MEDICINE CLINIC | Age: 48
End: 2023-01-30
Payer: MEDICARE

## 2023-01-30 VITALS
BODY MASS INDEX: 34.79 KG/M2 | HEART RATE: 104 BPM | SYSTOLIC BLOOD PRESSURE: 98 MMHG | OXYGEN SATURATION: 96 % | HEIGHT: 64 IN | WEIGHT: 203.8 LBS | DIASTOLIC BLOOD PRESSURE: 70 MMHG

## 2023-01-30 DIAGNOSIS — G45.9 TIA (TRANSIENT ISCHEMIC ATTACK): ICD-10-CM

## 2023-01-30 DIAGNOSIS — E66.9 CLASS 1 OBESITY WITH SERIOUS COMORBIDITY AND BODY MASS INDEX (BMI) OF 34.0 TO 34.9 IN ADULT, UNSPECIFIED OBESITY TYPE: ICD-10-CM

## 2023-01-30 DIAGNOSIS — I10 ESSENTIAL HYPERTENSION: Primary | ICD-10-CM

## 2023-01-30 DIAGNOSIS — E78.2 MIXED HYPERLIPIDEMIA: ICD-10-CM

## 2023-01-30 DIAGNOSIS — J44.9 CHRONIC OBSTRUCTIVE PULMONARY DISEASE, UNSPECIFIED COPD TYPE (HCC): ICD-10-CM

## 2023-01-30 DIAGNOSIS — R73.09 ABNORMAL GLUCOSE: ICD-10-CM

## 2023-01-30 LAB
A/G RATIO: 1.8 (ref 1.1–2.2)
ALBUMIN SERPL-MCNC: 4.4 G/DL (ref 3.4–5)
ALP BLD-CCNC: 120 U/L (ref 40–129)
ALT SERPL-CCNC: 20 U/L (ref 10–40)
ANION GAP SERPL CALCULATED.3IONS-SCNC: 14 MMOL/L (ref 3–16)
AST SERPL-CCNC: 15 U/L (ref 15–37)
BASOPHILS ABSOLUTE: 0.1 K/UL (ref 0–0.2)
BASOPHILS RELATIVE PERCENT: 0.5 %
BILIRUB SERPL-MCNC: <0.2 MG/DL (ref 0–1)
BUN BLDV-MCNC: 13 MG/DL (ref 7–20)
CALCIUM SERPL-MCNC: 9.9 MG/DL (ref 8.3–10.6)
CHLORIDE BLD-SCNC: 102 MMOL/L (ref 99–110)
CHOLESTEROL, TOTAL: 253 MG/DL (ref 0–199)
CO2: 25 MMOL/L (ref 21–32)
CREAT SERPL-MCNC: 1 MG/DL (ref 0.6–1.1)
EOSINOPHILS ABSOLUTE: 0.4 K/UL (ref 0–0.6)
EOSINOPHILS RELATIVE PERCENT: 2.7 %
GFR SERPL CREATININE-BSD FRML MDRD: >60 ML/MIN/{1.73_M2}
GLUCOSE BLD-MCNC: 99 MG/DL (ref 70–99)
HCT VFR BLD CALC: 43.4 % (ref 36–48)
HDLC SERPL-MCNC: 35 MG/DL (ref 40–60)
HEMATOLOGY PATH CONSULT: NO
HEMOGLOBIN: 14.1 G/DL (ref 12–16)
LDL CHOLESTEROL CALCULATED: ABNORMAL MG/DL
LDL CHOLESTEROL DIRECT: 169 MG/DL
LYMPHOCYTES ABSOLUTE: 5.6 K/UL (ref 1–5.1)
LYMPHOCYTES RELATIVE PERCENT: 35.2 %
MCH RBC QN AUTO: 29.1 PG (ref 26–34)
MCHC RBC AUTO-ENTMCNC: 32.6 G/DL (ref 31–36)
MCV RBC AUTO: 89.4 FL (ref 80–100)
MONOCYTES ABSOLUTE: 0.8 K/UL (ref 0–1.3)
MONOCYTES RELATIVE PERCENT: 4.8 %
NEUTROPHILS ABSOLUTE: 9 K/UL (ref 1.7–7.7)
NEUTROPHILS RELATIVE PERCENT: 56.8 %
PDW BLD-RTO: 14.2 % (ref 12.4–15.4)
PLATELET # BLD: 308 K/UL (ref 135–450)
PMV BLD AUTO: 8.5 FL (ref 5–10.5)
POTASSIUM SERPL-SCNC: 4.1 MMOL/L (ref 3.5–5.1)
RBC # BLD: 4.85 M/UL (ref 4–5.2)
SODIUM BLD-SCNC: 141 MMOL/L (ref 136–145)
TOTAL PROTEIN: 6.9 G/DL (ref 6.4–8.2)
TRIGL SERPL-MCNC: 313 MG/DL (ref 0–150)
VLDLC SERPL CALC-MCNC: ABNORMAL MG/DL
WBC # BLD: 15.9 K/UL (ref 4–11)

## 2023-01-30 PROCEDURE — 99214 OFFICE O/P EST MOD 30 MIN: CPT | Performed by: FAMILY MEDICINE

## 2023-01-30 PROCEDURE — 3078F DIAST BP <80 MM HG: CPT | Performed by: FAMILY MEDICINE

## 2023-01-30 PROCEDURE — 3074F SYST BP LT 130 MM HG: CPT | Performed by: FAMILY MEDICINE

## 2023-01-30 PROCEDURE — 36415 COLL VENOUS BLD VENIPUNCTURE: CPT | Performed by: FAMILY MEDICINE

## 2023-01-30 RX ORDER — AZITHROMYCIN 250 MG/1
TABLET, FILM COATED ORAL
COMMUNITY
Start: 2023-01-19

## 2023-01-30 ASSESSMENT — PATIENT HEALTH QUESTIONNAIRE - PHQ9
1. LITTLE INTEREST OR PLEASURE IN DOING THINGS: 1
SUM OF ALL RESPONSES TO PHQ QUESTIONS 1-9: 4
6. FEELING BAD ABOUT YOURSELF - OR THAT YOU ARE A FAILURE OR HAVE LET YOURSELF OR YOUR FAMILY DOWN: 0
5. POOR APPETITE OR OVEREATING: 0
4. FEELING TIRED OR HAVING LITTLE ENERGY: 1
SUM OF ALL RESPONSES TO PHQ9 QUESTIONS 1 & 2: 1
3. TROUBLE FALLING OR STAYING ASLEEP: 1
7. TROUBLE CONCENTRATING ON THINGS, SUCH AS READING THE NEWSPAPER OR WATCHING TELEVISION: 0
SUM OF ALL RESPONSES TO PHQ QUESTIONS 1-9: 4
8. MOVING OR SPEAKING SO SLOWLY THAT OTHER PEOPLE COULD HAVE NOTICED. OR THE OPPOSITE, BEING SO FIGETY OR RESTLESS THAT YOU HAVE BEEN MOVING AROUND A LOT MORE THAN USUAL: 1
SUM OF ALL RESPONSES TO PHQ QUESTIONS 1-9: 4
2. FEELING DOWN, DEPRESSED OR HOPELESS: 0
SUM OF ALL RESPONSES TO PHQ QUESTIONS 1-9: 4
9. THOUGHTS THAT YOU WOULD BE BETTER OFF DEAD, OR OF HURTING YOURSELF: 0
10. IF YOU CHECKED OFF ANY PROBLEMS, HOW DIFFICULT HAVE THESE PROBLEMS MADE IT FOR YOU TO DO YOUR WORK, TAKE CARE OF THINGS AT HOME, OR GET ALONG WITH OTHER PEOPLE: 0

## 2023-01-30 ASSESSMENT — ENCOUNTER SYMPTOMS: SHORTNESS OF BREATH: 0
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
## 2023-01-31 LAB
ESTIMATED AVERAGE GLUCOSE: 111.2 MG/DL
HBA1C MFR BLD: 5.5 %

## 2023-01-31 NOTE — RESULT ENCOUNTER NOTE
Hemoglobin A1c normal at 5.5. Would recommend continue to work on diet, activity, weight loss. Total cholesterol elevated at 15.9. Recent steroids. Repeat CBC with differential in 1 to 2 weeks. Lipids are significantly elevated. This does increase her overall cardiovascular risk. I believe she recently began atorvastatin. If this is the case, okay to continue for now. Repeat CMP and lipids in 3 to 4 months. We will potentially increase atorvastatin at that time.   CMP okay

## 2023-02-03 DIAGNOSIS — D58.2 ELEVATED HEMOGLOBIN (HCC): Primary | ICD-10-CM

## 2023-02-13 ENCOUNTER — CARE COORDINATION (OUTPATIENT)
Dept: CARE COORDINATION | Age: 48
End: 2023-02-13

## 2023-02-13 NOTE — CARE COORDINATION
Attempted to contact patient for CC f/u call; left HIPPA compliant message and ACM contact information. Contacted [facility] and spoke with Stefania Sullivan to check on status of nebulizer. Stefania Sullivan confirms patient picked up nebulizer last week.

## 2023-02-14 ENCOUNTER — OFFICE VISIT (OUTPATIENT)
Dept: FAMILY MEDICINE CLINIC | Age: 48
End: 2023-02-14
Payer: MEDICARE

## 2023-02-14 VITALS
DIASTOLIC BLOOD PRESSURE: 68 MMHG | WEIGHT: 204.4 LBS | HEART RATE: 106 BPM | OXYGEN SATURATION: 95 % | HEIGHT: 64 IN | BODY MASS INDEX: 34.89 KG/M2 | SYSTOLIC BLOOD PRESSURE: 104 MMHG

## 2023-02-14 DIAGNOSIS — R59.0 AXILLARY LYMPHADENOPATHY: ICD-10-CM

## 2023-02-14 DIAGNOSIS — R91.8 LUNG NODULES: ICD-10-CM

## 2023-02-14 DIAGNOSIS — R07.89 CHEST WALL PAIN: ICD-10-CM

## 2023-02-14 DIAGNOSIS — D72.829 LEUKOCYTOSIS, UNSPECIFIED TYPE: Primary | ICD-10-CM

## 2023-02-14 DIAGNOSIS — R79.89 ABNORMAL CBC: ICD-10-CM

## 2023-02-14 PROCEDURE — 99214 OFFICE O/P EST MOD 30 MIN: CPT | Performed by: FAMILY MEDICINE

## 2023-02-14 PROCEDURE — 3074F SYST BP LT 130 MM HG: CPT | Performed by: FAMILY MEDICINE

## 2023-02-14 PROCEDURE — 3078F DIAST BP <80 MM HG: CPT | Performed by: FAMILY MEDICINE

## 2023-02-14 SDOH — ECONOMIC STABILITY: FOOD INSECURITY: WITHIN THE PAST 12 MONTHS, THE FOOD YOU BOUGHT JUST DIDN'T LAST AND YOU DIDN'T HAVE MONEY TO GET MORE.: NEVER TRUE

## 2023-02-14 SDOH — ECONOMIC STABILITY: HOUSING INSECURITY
IN THE LAST 12 MONTHS, WAS THERE A TIME WHEN YOU DID NOT HAVE A STEADY PLACE TO SLEEP OR SLEPT IN A SHELTER (INCLUDING NOW)?: NO

## 2023-02-14 SDOH — ECONOMIC STABILITY: FOOD INSECURITY: WITHIN THE PAST 12 MONTHS, YOU WORRIED THAT YOUR FOOD WOULD RUN OUT BEFORE YOU GOT MONEY TO BUY MORE.: NEVER TRUE

## 2023-02-14 SDOH — ECONOMIC STABILITY: INCOME INSECURITY: HOW HARD IS IT FOR YOU TO PAY FOR THE VERY BASICS LIKE FOOD, HOUSING, MEDICAL CARE, AND HEATING?: NOT VERY HARD

## 2023-02-14 ASSESSMENT — ENCOUNTER SYMPTOMS
COUGH: 1
SHORTNESS OF BREATH: 1

## 2023-02-14 NOTE — PROGRESS NOTES
Chief Complaint   Patient presents with    ED Follow-up     Patient was in [facility] 23 for rib pain. HPI:  Richy Judge is a 57 y.o. (: 1975) here today   for ED follow up. Patient was in [facility] ER on 23 for rib pain. HPI  Presented to ER for left sided rib pain. Was having sob noted. Had labs, cxr, CT chest at that time. Old rib fractures on the left noted on CT. Left sided rib pain. Was having sig sob noted then. Pressure seemed to help overall. Lung nodule noted. Also w/ enlarged lymph node to right axilla. Patient's medications, allergies, past medical, surgical, social and family histories were reviewed and updated as appropriate. ROS:  Review of Systems   Constitutional:  Negative for fever. Respiratory:  Positive for cough and shortness of breath. Cardiovascular:  Positive for chest pain (chest wall).          Hemoglobin A1C (%)   Date Value   2023 5.5     Microscopic Examination (no units)   Date Value   2019 YES     LDL Calculated (mg/dL)   Date Value   2023 see below       Past Medical History:   Diagnosis Date    Arthritis     Asthma     Bipolar 1 disorder (HCC)     Chronic constipation     COPD (chronic obstructive pulmonary disease) ([facility]     CRPS (complex regional pain syndrome type II)     Dental abscess     Essential hypertension 2021    GERD (gastroesophageal reflux disease)     Hx of blood clots     hx of pos d dimer    Low back pain     Mixed hyperlipidemia 2021    Orthostatic hypotension     Osteoporosis     Peptic ulcer disease     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 ([address]     Psychosis ([facility]     PTSD (post-traumatic stress disorder)        Family History   Problem Relation Age of Onset    Cancer Mother     Cancer Father     Cancer Brother     Heart Disease Brother        Social History     Socioeconomic History    Marital status:      Spouse name: Not on file    Number of children: Not on file    Years of education: Not on file    Highest education level: Not on file   Occupational History    Not on file   Tobacco Use    Smoking status: Every Day     Packs/day: 0.50     Types: Cigarettes     Start date: 1/28/1994    Smokeless tobacco: Never    Tobacco comments:     Currently trying to quit   Vaping Use    Vaping Use: Never used   Substance and Sexual Activity    Alcohol use: No    Drug use: Not Currently     Types: Marijuana Celia Abdiaziz)    Sexual activity: Not Currently   Other Topics Concern    Not on file   Social History Narrative    Not on file     Social Determinants of Health     Financial Resource Strain: Low Risk     Difficulty of Paying Living Expenses: Not very hard   Food Insecurity: No Food Insecurity    Worried About Running Out of Food in the Last Year: Never true    Ran Out of Food in the Last Year: Never true   Transportation Needs: Unmet Transportation Needs    Lack of Transportation (Medical): Yes    Lack of Transportation (Non-Medical): Yes   Physical Activity: Not on file   Stress: Not on file   Social Connections: Not on file   Intimate Partner Violence: Not on file   Housing Stability: Unknown    Unable to Pay for Housing in the Last Year: Not on file    Number of [place] in the Last Year: Not on file    Unstable Housing in the Last Year: No       Prior to Visit Medications    Medication Sig Taking?  Authorizing Provider   carvedilol (COREG) 6.25 MG tablet Take 1 tablet by mouth 2 times daily Yes Selma Miner MD   atorvastatin (LIPITOR) 10 MG tablet Take 1 tablet by mouth daily Yes Selma Miner MD   ibuprofen (ADVIL;MOTRIN) 600 MG tablet Take 1 tablet by mouth 3 times daily as needed for Pain Yes Selma Miner MD   furosemide (LASIX) 40 MG tablet TAKE 1 TABLET BY MOUTH DAILY Yes BELA Sandy CNP   levocetirizine (XYZAL) 5 MG tablet Take 1 tablet by mouth nightly Yes BELA Sandy CNP   fluticasone (FLONASE) 50 MCG/ACT nasal spray 1 spray by Each Nostril route daily Yes BELA Jones - CNP   albuterol sulfate (PROAIR RESPICLICK) 014 (90 Base) MCG/ACT aerosol powder inhalation Inhale 2 puffs into the lungs every 6 hours as needed for Shortness of Breath Yes Jenna Calderon MD   promethazine (PHENERGAN) 25 MG tablet Take 1 tablet by mouth every 8 hours as needed for Nausea Yes Jenna Calderon MD   aspirin 81 MG EC tablet Take 1 tablet by mouth daily Yes Jenna Calderon MD   omeprazole (PRILOSEC) 40 MG delayed release capsule Take 1 capsule by mouth 2 times daily Yes Jenna Calderon MD   albuterol (PROVENTIL) (2.5 MG/3ML) 0.083% nebulizer solution Take 3 mLs by nebulization every 4 hours as needed for Wheezing Yes Jenna Calderon MD   lamoTRIgine (LAMICTAL) 25 MG tablet Take 2 tablets by mouth daily Yes Jenna Calderon MD   albuterol sulfate HFA (PROVENTIL;VENTOLIN;PROAIR) 108 (90 Base) MCG/ACT inhaler Inhale 2 puffs into the lungs every 6 hours as needed for Shortness of Breath Yes Jenna Calderon MD   docusate sodium (COLACE) 100 MG capsule TAKE 1 CAPSULE BY MOUTH TWICE A DAY AS NEEDED Yes Jenna Calderon MD   linaclotide (LINZESS) 290 MCG CAPS capsule TAKE 1 CAPSULE BY MOUTH DAILY ON A EMPTY STOMACH AT LEAST 30 MINUTES BEFORE 1ST MEAL Yes Jenna Calderon MD   budesonide-formoterol (SYMBICORT) 160-4.5 MCG/ACT AERO Inhale 2 puffs into the lungs 2 times daily Yes Jenna Calderon MD   LORazepam (ATIVAN) 2 MG tablet Take 2 mg by mouth in the morning and 2 mg at noon and 2 mg before bedtime. Yes Historical Provider, MD   Probiotic Product (DIGESTIVE ADVANTAGE PO) Take by mouth Yes Historical Provider, MD   APPLE CIDER VINEGAR PO Take by mouth Yes Historical Provider, MD   pantoprazole sodium (PROTONIX) 40 MG PACK packet Take 40 mg by mouth daily Yes Historical Provider, MD   gabapentin (NEURONTIN) 300 MG capsule Take 300 mg by mouth 3 times daily.  Yes Historical Provider, MD   paliperidone (INVEGA) 9 MG extended release tablet Take 9 mg by mouth every morning Yes Historical Provider, MD   escitalopram (LEXAPRO) 20 MG tablet Take 20 mg by mouth daily Yes Historical Provider, MD   nicotine (NICODERM CQ) 21 MG/24HR Place 1 patch onto the skin daily  Janae Faulkner MD   tiotropium (SPIRIVA RESPIMAT) 2.5 MCG/ACT AERS inhaler INHALE 2 PUFFS DAILY  Maurita Denver, APRN - CNP   imiquimod (ALDARA) 5 % cream APPLY 1 PACKET TOPICALLY TO AFFECTED AREA ON BODY THREE TIMES WEEKLY AT BEDTIME AND LEAVE ON FOR 8 HOURS, THEN [address] OFF AS DIRECTED BY PRESCRIBER  Maurita Denver, APRN - CNP   QUEtiapine (SEROQUEL) 25 MG tablet Take 200 mg by mouth at bedtime  Patient not taking: Reported on 2/14/2023  Historical Provider, MD   OXYGEN Inhale 2 L into the lungs   Historical Provider, MD       Allergies   Allergen Reactions    Clindamycin/Lincomycin Other (See Comments), Hives, Itching and Shortness Of Breath     Weakness, and dizziness  Eye and throat swelling       Penicillins Swelling and Other (See Comments)     Other reaction(s): anaphylaxis/angioedema, anaphylaxis/angioedema  Throat swelling  Eye and throat swelling   Eye and throat swelling      Amoxicillin     Citalopram Hydrobromide      She thinks it made her bp to elevate    Clavulanic Acid Nausea And Vomiting       OBJECTIVE:    /68   Pulse (!) 106   Ht 5' 4\" (1.626 m)   Wt 204 lb 6.4 oz (92.7 kg)   LMP 12/18/2013   SpO2 95%   BMI 35.09 kg/m²     BP Readings from Last 2 Encounters:   02/14/23 104/68   01/30/23 98/70       Wt Readings from Last 3 Encounters:   02/14/23 204 lb 6.4 oz (92.7 kg)   01/30/23 203 lb 12.8 oz (92.4 kg)   01/25/23 204 lb (92.5 kg)       Physical Exam  Constitutional:       Appearance: She is obese. HENT:      Head: Normocephalic and atraumatic. Eyes:      Extraocular Movements: Extraocular movements intact. Cardiovascular:      Rate and Rhythm: Normal rate and regular rhythm. Pulmonary:      Effort: Pulmonary effort is normal.      Breath sounds: Wheezing present. Skin:     General: Skin is warm and dry. Neurological:      General: No focal deficit present. Mental Status: She is alert and oriented to person, place, and time. ASSESSMENT/PLAN:    1. Leukocytosis, unspecified type  Recurrent elevation in wbc. Rpt in a couple of weeks. Consider referral if remains elevated. Reviewed ER note and labs  - CBC with Auto Differential; Future  - Comprehensive Metabolic Panel; Future    2. Axillary lymphadenopathy  Noted on CT, right axilla. Arrange u/s. May need referral for further eval and possible biopsy. - US SOFT TISSUE LIMITED AREA; Future    3. Chest wall pain  Prior rib fractures. Pain improved. Callus formation noted to prior rib fractures. 4. Abnormal CBC  See above. 5. Lung nodules  Seen by pulm approx 3 weeks ago. F/u w/ pulm as sched.   Rec smoking cessation

## 2023-02-20 ENCOUNTER — HOSPITAL ENCOUNTER (OUTPATIENT)
Dept: SPEECH THERAPY | Age: 48
Setting detail: THERAPIES SERIES
Discharge: HOME | End: 2023-02-20

## 2023-02-20 NOTE — PROGRESS NOTES
Speech Language Pathology    Pt was scheduled for an outpatient modified barium swallow study at 0900 this date. The pt was a \"no show\" for today's appointment.     Marquise Tellez M.S. [id number] [facility]  Speech-language pathologist  [phone]

## 2023-02-21 ENCOUNTER — TELEPHONE (OUTPATIENT)
Dept: BARIATRICS/WEIGHT MGMT | Age: 48
End: 2023-02-21

## 2023-02-21 NOTE — TELEPHONE ENCOUNTER
Patient was sent Dr. Karl Rader digital bariatric seminar. Patient DOES have BWLS coverage with [employer] (No Req diet - medicare guidelines) Bariatric benefit form scanned in media.     *PLEASE VERIFY BMI *

## 2023-02-22 DIAGNOSIS — J44.1 COPD EXACERBATION (HCC): ICD-10-CM

## 2023-02-22 DIAGNOSIS — J44.9 CHRONIC OBSTRUCTIVE PULMONARY DISEASE, UNSPECIFIED COPD TYPE (HCC): ICD-10-CM

## 2023-02-22 RX ORDER — FUROSEMIDE 40 MG/1
40 TABLET ORAL DAILY
Qty: 90 TABLET | Refills: 3 | Status: SHIPPED | OUTPATIENT
Start: 2023-02-22

## 2023-02-22 RX ORDER — ALBUTEROL SULFATE 90 UG/1
POWDER, METERED RESPIRATORY (INHALATION)
Qty: 1 EACH | Refills: 3 | Status: SHIPPED | OUTPATIENT
Start: 2023-02-22

## 2023-02-22 RX ORDER — BUDESONIDE AND FORMOTEROL FUMARATE DIHYDRATE 160; 4.5 UG/1; UG/1
AEROSOL RESPIRATORY (INHALATION)
Qty: 10.2 G | Refills: 5 | Status: SHIPPED | OUTPATIENT
Start: 2023-02-22

## 2023-02-22 NOTE — TELEPHONE ENCOUNTER
Future appt scheduled 0 appt scheduled   Return in about 6 weeks (around 3/28/2023),                        Last appt 02/14/2023      Last Written     budesonide-formoterol (SYMBICORT) 160-4.5 MCG/ACT AERO  09/12/2022  #6 each  5 RF     albuterol sulfate (PROAIR RESPICLICK) 821 (90 Base) MCG/ACT aerosol powder inhalation  11/03/2022  #3 each   3 each

## 2023-02-23 LAB
CREAT SERPL-MCNC: 1.13 MG/DL
POTASSIUM (K+): 4.5

## 2023-02-24 DIAGNOSIS — I10 ESSENTIAL HYPERTENSION: Primary | ICD-10-CM

## 2023-02-28 DIAGNOSIS — D72.829 LEUKOCYTOSIS, UNSPECIFIED TYPE: Primary | ICD-10-CM

## 2023-03-08 ENCOUNTER — CARE COORDINATION (OUTPATIENT)
Dept: CARE COORDINATION | Age: 48
End: 2023-03-08

## 2023-03-15 ENCOUNTER — TELEPHONE (OUTPATIENT)
Dept: FAMILY MEDICINE CLINIC | Age: 48
End: 2023-03-15

## 2023-03-15 DIAGNOSIS — Z01.818 TUBAL LIGATION EVALUATION: Primary | ICD-10-CM

## 2023-03-15 NOTE — TELEPHONE ENCOUNTER
Patient is calling and asking for a referral to GYN to get her tubes untied. Patient states she had a partial hysterectomy but they told her she can still get pregnant.

## 2023-03-15 NOTE — TELEPHONE ENCOUNTER
[facility] would recommend seeing Dr. Edilberto Dutton at [facility]. Could also place external referral for OB/GYN the patient could be seen at [place] [address] or [facility] group.

## 2023-03-20 ENCOUNTER — TELEPHONE (OUTPATIENT)
Dept: FAMILY MEDICINE CLINIC | Age: 48
End: 2023-03-20

## 2023-03-20 NOTE — TELEPHONE ENCOUNTER
Could patient drop off urine to be ran in office? Any other symptoms such as fever, hematuria, flank pain?

## 2023-03-20 NOTE — TELEPHONE ENCOUNTER
Pt called and ask for something to be called in for a UTI. She said she is straining to urinate, lots of pressure, hurting in the lower stomach.

## 2023-03-21 ENCOUNTER — NURSE ONLY (OUTPATIENT)
Dept: FAMILY MEDICINE CLINIC | Age: 48
End: 2023-03-21
Payer: MEDICARE

## 2023-03-21 ENCOUNTER — TELEPHONE (OUTPATIENT)
Dept: FAMILY MEDICINE CLINIC | Age: 48
End: 2023-03-21

## 2023-03-21 DIAGNOSIS — R10.30 LOWER ABDOMINAL PAIN: ICD-10-CM

## 2023-03-21 DIAGNOSIS — R30.0 DYSURIA: ICD-10-CM

## 2023-03-21 DIAGNOSIS — R39.9 UTI SYMPTOMS: Primary | ICD-10-CM

## 2023-03-21 DIAGNOSIS — R33.9 URINARY RETENTION: Primary | ICD-10-CM

## 2023-03-21 LAB
BILIRUBIN, POC: NEGATIVE
BLOOD URINE, POC: NORMAL
CLARITY, POC: CLEAR
COLOR, POC: YELLOW
GLUCOSE URINE, POC: NEGATIVE
KETONES, POC: NEGATIVE
LEUKOCYTE EST, POC: NEGATIVE
NITRITE, POC: NEGATIVE
PH, POC: 6
PROTEIN, POC: NEGATIVE
SPECIFIC GRAVITY, POC: 1.02
UROBILINOGEN, POC: 0.2

## 2023-03-21 PROCEDURE — 99421 OL DIG E/M SVC 5-10 MIN: CPT

## 2023-03-21 PROCEDURE — 81002 URINALYSIS NONAUTO W/O SCOPE: CPT

## 2023-03-21 RX ORDER — NITROFURANTOIN 25; 75 MG/1; MG/1
100 CAPSULE ORAL 2 TIMES DAILY
Qty: 20 CAPSULE | Refills: 0 | Status: SHIPPED | OUTPATIENT
Start: 2023-03-21 | End: 2023-03-31

## 2023-03-21 NOTE — TELEPHONE ENCOUNTER
Pt has frequency, urgency and painful urination.  Please send script to [phone] [facility]

## 2023-03-21 NOTE — TELEPHONE ENCOUNTER
Patient called into the office yesterday regarding UTI symptoms. Was having difficulty with urination, frequency, urgency, pelvic pressure, low abdominal discomfort. No availability to be seen yesterday but was agreeable to doing online visit for the patient if able to drop off urine sample in office. Patient came in today to have urine ran for evaluation. Urinalysis in office today positive for blood but no other abnormalities. Given symptoms and findings of blood on urinalysis we will send off her urine culture testing. Patient will be advised to increase water intake to flush kidneys and bladder. May take Azo OTC for any urinary burning. We will provide oral antibiotics to pharmacy requested. Patient will be advised pending urine culture result there may need to be a change in oral antibiotic treatment. Patient will be advised to follow-up with office if symptoms fail to improve or worsen.     Lab Results   Component Value Date     02/09/2023    K 4.5 02/23/2023     02/09/2023    CO2 24 02/09/2023    BUN 17 02/09/2023    CREATININE 1.128 02/23/2023    GLUCOSE 99 01/30/2023    CALCIUM 9.9 02/09/2023    PROT 7.9 02/09/2023    LABALBU 4.9 02/09/2023    BILITOT 0.3 02/09/2023    ALKPHOS 114 02/09/2023    AST 37 (H) 02/09/2023    ALT 26 02/09/2023    LABGLOM 53 02/09/2023    GFRAA >60 09/12/2022    AGRATIO 1.6 02/09/2023    GLOB 3.0 02/09/2023     Past Medical History:   Diagnosis Date    Arthritis     Asthma     Bipolar 1 disorder ([facility])     Chronic constipation     COPD (chronic obstructive pulmonary disease) ([facility])     CRPS (complex regional pain syndrome type II)     Dental abscess     Essential hypertension 08/12/2021    GERD (gastroesophageal reflux disease)     Hx of blood clots     hx of pos d dimer    Low back pain     Mixed hyperlipidemia 08/12/2021    Orthostatic hypotension     Osteoporosis     Peptic ulcer disease     Personality disorder with predominantly sociopathic or asocial

## 2023-03-22 LAB — BACTERIA UR CULT: NORMAL

## 2023-03-28 ENCOUNTER — CARE COORDINATION (OUTPATIENT)
Dept: FAMILY MEDICINE CLINIC | Age: 48
End: 2023-03-28

## 2023-04-11 ENCOUNTER — APPOINTMENT (OUTPATIENT)
Dept: GENERAL RADIOLOGY | Age: 48
End: 2023-04-11
Payer: MEDICARE

## 2023-04-11 ENCOUNTER — HOSPITAL ENCOUNTER (EMERGENCY)
Age: 48
Discharge: HOME | End: 2023-04-11
Payer: MEDICARE

## 2023-04-11 VITALS
RESPIRATION RATE: 18 BRPM | BODY MASS INDEX: 36.22 KG/M2 | OXYGEN SATURATION: 97 % | TEMPERATURE: 97.8 F | DIASTOLIC BLOOD PRESSURE: 70 MMHG | SYSTOLIC BLOOD PRESSURE: 118 MMHG | HEART RATE: 95 BPM | WEIGHT: 211 LBS

## 2023-04-11 DIAGNOSIS — M25.561 ACUTE PAIN OF RIGHT KNEE: Primary | ICD-10-CM

## 2023-04-11 PROCEDURE — 6370000000 HC RX 637 (ALT 250 FOR IP): Performed by: PHYSICIAN ASSISTANT

## 2023-04-11 PROCEDURE — 73562 X-RAY EXAM OF KNEE 3: CPT

## 2023-04-11 PROCEDURE — 99283 EMERGENCY DEPT VISIT LOW MDM: CPT

## 2023-04-11 RX ORDER — NAPROXEN 250 MG/1
500 TABLET ORAL ONCE
Status: COMPLETED | OUTPATIENT
Start: 2023-04-11 | End: 2023-04-11

## 2023-04-11 RX ADMIN — NAPROXEN 500 MG: 250 TABLET ORAL at 10:28

## 2023-04-11 ASSESSMENT — ENCOUNTER SYMPTOMS
DIARRHEA: 0
NAUSEA: 0
SINUS PAIN: 0
CHEST TIGHTNESS: 0
SHORTNESS OF BREATH: 0
SORE THROAT: 0
RHINORRHEA: 0
EYE DISCHARGE: 0
COUGH: 0
SINUS PRESSURE: 0
EYE REDNESS: 0
CONSTIPATION: 0
VOMITING: 0
ABDOMINAL PAIN: 0

## 2023-04-11 ASSESSMENT — PAIN - FUNCTIONAL ASSESSMENT: PAIN_FUNCTIONAL_ASSESSMENT: 0-10

## 2023-04-11 ASSESSMENT — PAIN DESCRIPTION - LOCATION: LOCATION: KNEE

## 2023-04-11 ASSESSMENT — PAIN SCALES - GENERAL: PAINLEVEL_OUTOF10: 9

## 2023-04-11 NOTE — ED NOTES
Patient called out stating \"I have to leave, my ride is going to leave me here\". RN to bedside, patient found dressed in personal clothing walking around room. Patient asked for discharge paperwork, RN informed that no paperwork has been printed yet due to radiology results not being complete. Patient verbalized understanding and states she will follow up with PCP. Patient then ambulated unassisted out of ED with family/friend.      Jon Luciano RN  04/11/23 9963

## 2023-04-11 NOTE — ED PROVIDER NOTES
[facility]  ED  EMERGENCY DEPARTMENT ENCOUNTER        Pt Name: Susannah Boyce  MRN: [id number]  [place] 1975  Date of evaluation: 4/11/2023  Provider: Anthony Quiroz PA-C  PCP: Yvette Johns MD  Note Started: 10:15 AM EDT 4/11/23      ERICH. I have evaluated this patient. My supervising physician was available for consultation. CHIEF COMPLAINT       Chief Complaint   Patient presents with    Knee Pain     Right knee pain for 4 days nki. Was sent by PCP to have xray,        HISTORY OF PRESENT ILLNESS: 1 or more Elements     History from : Patient    Limitations to history : None    Susannah Boyce is a 57 y.o. female who presents to the ED with right knee pain, onset this am. No known injury. Not taken anything for her symptoms. NO known past injury. Pt advised she is a violent sleeper and may have injured her knee while sleeping. Pain is worse with weight bearing. Nursing Notes were all reviewed and agreed with or any disagreements were addressed in the HPI. REVIEW OF SYSTEMS :      Review of Systems   Constitutional:  Negative for chills and fever. HENT: Negative. Negative for congestion, rhinorrhea, sinus pressure, sinus pain and sore throat. Eyes:  Negative for discharge, redness and visual disturbance. Respiratory:  Negative for cough, chest tightness and shortness of breath. Cardiovascular:  Negative for chest pain and palpitations. Gastrointestinal:  Negative for abdominal pain, constipation, diarrhea, nausea and vomiting. Genitourinary:  Negative for difficulty urinating, dysuria and frequency. Musculoskeletal:  Positive for arthralgias and myalgias. Skin: Negative. Neurological: Negative. Negative for dizziness, weakness, numbness and headaches. Psychiatric/Behavioral: Negative. All other systems reviewed and are negative. Positives and Pertinent negatives as per HPI.      SURGICAL HISTORY     Past Surgical History:   Procedure Laterality

## 2023-04-11 NOTE — ED NOTES
RN to bedside to medicate patient, patient states after pain medication she needs to leave. Patient states \"my ride is going to leave me so I need to get out there\". VIJAY Hill aware.       Tricia Morgan RN  04/11/23 7723

## 2023-04-14 PROBLEM — R91.1 LUNG NODULE: Status: ACTIVE | Noted: 2023-04-14

## 2023-04-14 PROBLEM — Z87.891 FORMER SMOKER: Status: ACTIVE | Noted: 2023-04-14

## 2023-04-19 DIAGNOSIS — J44.9 CHRONIC OBSTRUCTIVE PULMONARY DISEASE, UNSPECIFIED COPD TYPE (HCC): ICD-10-CM

## 2023-04-20 ENCOUNTER — CARE COORDINATION (OUTPATIENT)
Dept: CARE COORDINATION | Age: 48
End: 2023-04-20

## 2023-04-20 DIAGNOSIS — I10 ESSENTIAL HYPERTENSION: Primary | ICD-10-CM

## 2023-04-20 DIAGNOSIS — J44.1 COPD EXACERBATION (HCC): ICD-10-CM

## 2023-04-20 DIAGNOSIS — J45.909 UNCOMPLICATED ASTHMA, UNSPECIFIED ASTHMA SEVERITY, UNSPECIFIED WHETHER PERSISTENT: ICD-10-CM

## 2023-04-20 NOTE — CARE COORDINATION
Ambulatory Care Coordination Note  4/20/2023    Patient Current Location:  Home: [address] [place] [address]     ACM contacted the patient by telephone. Verified name  patient as identifiers. Provided introduction to self, and explanation of the ACM role. Challenges to be reviewed by the provider   Additional needs identified to be addressed with provider: Yes  none               Method of communication with provider: none. ACM: Hector Hebert, RN    PLAN:  ACP referral  Patient will access her My Chart to obtain the ACP paperwork-She plans on going to the [employer] to print it out  She verbalizes awareness the documents require a notary to be present when completing docurments  Continue Smoking Cessation  Outreach to [place]  Keep appointment with [facility]; next f/u 4/25/23  Start Nicotine Patches-Patient reports her Pharmacy is delivering them this week  Dr. Lynn Oh office is to call back regarding patient scheduling colonoscopy  Sent My Chart video and hands re: smoking cessation; see attachments    FYI  Patient reports she continues with smoking cessation  She reports improving her health and saving money is her motivation  She has increased her activity-walking to improve health and helps with stress reduction  Reviewed emergency contact for RPM and discussed ACP      Offered patient enrollment in the Remote Patient Monitoring (RPM) program for in-home monitoring: Yes, patient enrolled:     Remote Patient Monitoring Enrollment Note      Date/Time: 4/20/2023 10:47 AM    Offered patient enrollment in the [facility] Patient Monitoring (RPM) program for in home monitoring for COPD, HTN, and Asthma. Patient accepted RPM services.     Patient will be monitoring the following daily:  activity level, blood pressure reading, blood pressure heart rate, medications, pulse ox reading, and survey response    ACM reviewed the information below with patient:    Emergency Contact (name and contact

## 2023-04-20 NOTE — ACP (ADVANCE CARE PLANNING)
Advance Care Planning   Healthcare Decision Maker:      Click here to complete Healthcare Decision Makers including selection of the Healthcare Decision Maker Relationship (ie \"Primary\"). Today we documented desired Decision Maker(s), who is (are) NOT Legal Next of Kin. ACP documents are required for decision maker authority.        If the relationship to the patient does NOT follow our state's Next of Kin hierarchy, the patient MUST complete an ACP Document to allow him/her to act on the patient's behalf. :

## 2023-04-20 NOTE — CARE COORDINATION
Remote Patient Kit Ordering Note      Date/Time:  4/20/2023 11:10 AM      [x] [facility]SS confirmed patient shipping address  [x] Patient will receive package over the next 2-4 business days. Someone 21 years or older must be present to sign for UPS delivery. [x] Patient to contact virtual installation-specific phone number listed in the patient instructions. [x] If the patient does not contact HRS within 24 hours, an [employer] [facility] will call the patient directly: If the patient does not answer, HRS will follow up with the clinical team notifying them about the unsuccessful attempt to contact the patient. HRS will make three call attempts to the patient. [x] ACM will contact patient once equipment is active to welcome them to the program.                                                         [x] Hours of RPM monitoring - Monday-Friday 5414-5102                     All questions answered at this time. ACM made aware the RPM kit has been ordered. CCSS notified patient of RPM equipment order.

## 2023-04-20 NOTE — PROGRESS NOTES
4/20/23 1:35 PM      Remote Patient Monitoring Treatment Plan    Received request from ACM/AURORA Fontaine RN  to order remote patient monitoring for in home monitoring of COPD, HTN, and Asthma and order completed. Patient will be monitoring blood pressure   pulse ox   survey questions. Please note: ACM has stated no scale is needed. Pt will not require weight monitoring via RPM.      Patient will engage in Remote Patient Monitoring each day to develop the skills necessary for self management. RPM Care Team Responsibilities:   Alerts will be reviewed daily and addressed within 2-4 hours during operational hours (Monday -Friday 9 am-4 pm)  Alert response and intervention documented in patient medical record  Alert response escalated to PCP per protocol and documented in patient medical record  Patient monitored over approximately  days  Discharge from program based on self-management readiness    See care coordination encounters for additional details.      Josafat Verma DNP, FNP-C, Remote Patient Monitoring NP, (Ph) [phone]

## 2023-04-24 ENCOUNTER — OFFICE VISIT (OUTPATIENT)
Dept: FAMILY MEDICINE CLINIC | Age: 48
End: 2023-04-24
Payer: MEDICARE

## 2023-04-24 ENCOUNTER — CARE COORDINATION (OUTPATIENT)
Dept: CASE MANAGEMENT | Age: 48
End: 2023-04-24

## 2023-04-24 VITALS
DIASTOLIC BLOOD PRESSURE: 86 MMHG | BODY MASS INDEX: 36.19 KG/M2 | OXYGEN SATURATION: 95 % | WEIGHT: 212 LBS | HEIGHT: 64 IN | SYSTOLIC BLOOD PRESSURE: 104 MMHG | HEART RATE: 99 BPM

## 2023-04-24 DIAGNOSIS — D48.5 NEOPLASM OF UNCERTAIN BEHAVIOR OF SKIN: ICD-10-CM

## 2023-04-24 DIAGNOSIS — D22.9 CHANGE IN MOLE: Primary | ICD-10-CM

## 2023-04-24 PROCEDURE — 3079F DIAST BP 80-89 MM HG: CPT | Performed by: FAMILY MEDICINE

## 2023-04-24 PROCEDURE — 3074F SYST BP LT 130 MM HG: CPT | Performed by: FAMILY MEDICINE

## 2023-04-24 PROCEDURE — 99213 OFFICE O/P EST LOW 20 MIN: CPT | Performed by: FAMILY MEDICINE

## 2023-04-24 NOTE — PROGRESS NOTES
Chief Complaint   Patient presents with    Referral - General       HPI:  Martha Young is a 57 y.o. (: 1975) here today   for referral for dermatology for skin tag removal.  HPI  Several skin lesions of concern. Have been present for some time. Lesion under right eye. Left cheek. Skin tags that seem to be bothered by wearing necklace. Some under arm, back, right upper abdomen. Lesion on back as well. Itching to to lesion on the back. Pain w/ scratching. Patient has quit smoking. She is working with our nurse care coordinator monitoring blood pressure and other ongoing issues. Patient's medications, allergies, past medical, surgical, social and family histories were reviewed and updated as appropriate. ROS:  Review of Systems   Constitutional:  Negative for fever. Skin:         Several skin lesions noted. Prior to Visit Medications    Medication Sig Taking?  Authorizing Provider   tiotropium (SPIRIVA RESPIMAT) 2.5 MCG/ACT AERS inhaler INHALE 2 PUFFS DAILY Yes Boo Guillen MD   budesonide-formoterol (SYMBICORT) 160-4.5 MCG/ACT AERO Inhale 2 puffs into the lungs 2 times daily Rinse mouth with water after use Yes Robe Bay MD   albuterol sulfate HFA (PROVENTIL;VENTOLIN;PROAIR) 108 (90 Base) MCG/ACT inhaler Inhale 2 puffs into the lungs every 6 hours as needed for Shortness of Breath Yes Robe Bay MD   HYDROcodone-acetaminophen ([address]) 5-325 MG per tablet  Yes Historical Provider, MD   cyclobenzaprine (FLEXERIL) 10 MG tablet  Yes Historical Provider, MD   nicotine (NICODERM CQ) 7 MG/24HR Place 1 patch onto the skin daily for 14 days Yes Boo Guillen MD   nicotine (NICODERM CQ) 14 MG/24HR Place 1 patch onto the skin daily for 14 days Yes Boo Guillen MD   nicotine (NICODERM CQ) 21 MG/24HR Place 1 patch onto the skin daily Yes Boo Guillen MD   furosemide (LASIX) 40 MG tablet TAKE 1 TABLET BY MOUTH DAILY Yes Boo Guillen MD   [facility] [phone]

## 2023-04-24 NOTE — CARE COORDINATION
Remote Alert Monitoring Note      Date/Time:  2023 3:51 PM  Patient Current Location: [facility]    LPN contacted patient by telephone regarding red alert received for blood pressure reading (72/63). Verified patients name and  as identifiers. Background: RPM for HTN, COPD, Asthma  Refer to 911 immediately if:  Patient unresponsive or unable to provide history  Change in cognition or sudden confusion  Patient unable to respond in complete sentences  Intense chest pain/tightness  Any concern for any clinical emergency  Red Alert: Provider response time of 1 hr required for any red alert requiring intervention  Yellow Alert: Provider response time of 3hr required for any escalated yellow alert    BP Triage  Are you having any Chest Pain? no   Are you having any Shortness of Breath? no   Do you have a headache or have any vision changes? no   Are you having any numbness or tingling? no   Are you having any other health concerns or issues? no       Clinical Interventions: Reviewed and followed up on alerts and treatments-LPN contacted pt in regards to RPM red alert for BP of 72/63,  bpm. Pt denied any chest pain, SOB, dizziness. Pt stated that she was just \"tired. \" Writer noted that pt had a f/u with PCP today and BP was WNL. Writer had pt recheck and new BP is 80/55, HR 99 bpm. Writer routed to PCP and ACM to advise. 16:33- Pt advised per PCP, to go to ER with sys BP lower than 90 and severe and or worsening fatigue. Pt verbalized understanding. Plan/Follow Up: Will continue to review, monitor and address alerts with follow up based on severity of symptoms and risk factors.     Leticia Elder LPN, [facility]  PH: [phone]    Current Patient Metrics ---- Activity: 30 mins Blood Pressure: 80/55, 99bpm Pulseox: 97%, 101bpm Survey: C Note Created at: 2023 03:55 PM ET ---- Time-Spent: 10 minutes 0 seconds

## 2023-04-25 ENCOUNTER — CARE COORDINATION (OUTPATIENT)
Dept: CASE MANAGEMENT | Age: 48
End: 2023-04-25

## 2023-04-25 ENCOUNTER — CARE COORDINATION (OUTPATIENT)
Dept: CARE COORDINATION | Age: 48
End: 2023-04-25

## 2023-04-25 ASSESSMENT — ENCOUNTER SYMPTOMS: DYSPNEA ASSOCIATED WITH: EXERTION

## 2023-04-25 NOTE — CARE COORDINATION
Remote Alert Monitoring Note      Date/Time:  2023 10:02 AM  Patient Current Location: [facility]    LPN contacted patient by telephone regarding red alert received for pulse ox reading (91%). Verified patients name and  as identifiers. Background: RPM for COPD, HTN, Asthma  Refer to 911 immediately if:  Patient unresponsive or unable to provide history  Change in cognition or sudden confusion  Patient unable to respond in complete sentences  Intense chest pain/tightness  Any concern for any clinical emergency  Red Alert: Provider response time of 1 hr required for any red alert requiring intervention  Yellow Alert: Provider response time of 3hr required for any escalated yellow alert    O2 Triage  Are you having any Chest Pain? no   Are you having any Shortness of Breath? Yes, not severe   Swelling in your hands or feet? no     Are you having any other health concerns or issues? Yes, per pt, diagnosed with PNA       Clinical Interventions: Reviewed and followed up on alerts and treatments-LPN contacted pt in regards to RPM red alert for PO of 91%. Pt denied chest pain, edema and severe SOB. Pt did state that she had been [facility] and discharged today. Pt stated that she has been diagnosed with PNA and given abx. Pt rechecked PO and new SpO2 is at 96%, WNL for this pt. Pt will report to ED with any severe and or worsening symptoms. ACM advised of hospital admission and PNA diagnosis. Plan/Follow Up: Will continue to review, monitor and address alerts with follow up based on severity of symptoms and risk factors.   Nikki Crain LPN, [facility]  PH: [phone]    Current Patient Metrics ---- Activity: 60 mins Blood Pressure: 116/86, 109bpm Pulseox: 96%, 116bpm Survey: C Note Created at: 2023 10:11 AM ET ---- Time-Spent: 10 minutes 0 seconds

## 2023-04-25 NOTE — CARE COORDINATION
Ambulatory Care Coordination Note  2023    Patient Current Location:  Home: [address] [place] [address]     ACM contacted the patient by telephone. Verified name and  with patient as identifiers. Provided introduction to self, and explanation of the ACM role. Challenges to be reviewed by the provider   Additional needs identified to be addressed with provider: No  none    Patient reports she was d/c from [facility] this morning  She was seen in ED yesterday and kept for observation  Patient reports she was diagnosed with pneumonia and has started Levaquin. Contacted [facility] and requested ED d/c notes be faxed to PCP           Method of communication with provider: chart routing. ACM: Brittanie Loya RN  Patient will continue with RPM monitoring  Patient reports she is taking anti-nausea medication and keeping down fluids/bland foods along with gator-aide  Reviewed the following:  Pneumonia Zones  New Medication: Levofloxacin  C/D/B exercises  Continue smoking cessation and avoid second hand smoke  Pulse ox instructions    Offered patient enrollment in the Remote Patient Monitoring (RPM) program for in-home monitoring: Yes, patient already enrolled.         Care Coordination Interventions    Referral from Primary Care Provider: No  Suggested Interventions and Community Resources  [facility] Completed  Transportation Support: Completed          Goals Addressed    None         Future Appointments   Date Time Provider Kana Mcmullen   2023 11:00 AM YASIRA US RM 2 MHAZ US Alondra Chang   2023  8:00 PM Kasie 34 2 [facility]   2023 11:20 AM MD MELVI Lozano [facility]

## 2023-04-25 NOTE — CARE COORDINATION
Remote Patient Monitoring Welcome Note  Date/Time:  2023 10:38 AM  Patient Current Location: Home: [address] [place] [address]  Verified patients name and  as identifiers. Completed and confirmed the following:   Emergency Contact: [phone]  [x] Patient received all RPM equipment (tablet, scale, blood pressure device and cuff, and pulse oximeter)  Cuff Size: large (13.8\"-19.68\")    Weight Scale:  regular (<330lbs)                    [x] Instructed patient keep box for use when returning equipment                                                          [x] Reviewed Patient Welcome Letter with patient                         [x] Reviewed expectations for patient and care team  Monitoring hours M-F 9-4pm  Completing monitoring by 12pm on  so that alerts can be responded to in the same day  Patient weighs self at same time every day (or after urinating and waking up)  Take blood pressure 1-2 hrs after medications   RPM team may have different phone area code (including [place], [place], [place] or [address])                              [x] Instructed patient to keep scale on flat surface                                                         [x] Instructed patient to keep tablet plugged in at all times                         [x] Instructed how to contact IT support ([phone])  [x] Provided Remote Patient Monitoring care  information               All questions answered at this time.

## 2023-04-26 ENCOUNTER — HOSPITAL ENCOUNTER (OUTPATIENT)
Dept: ULTRASOUND IMAGING | Age: 48
Discharge: HOME | End: 2023-04-26
Payer: MEDICARE

## 2023-04-26 DIAGNOSIS — R59.0 AXILLARY LYMPHADENOPATHY: ICD-10-CM

## 2023-04-26 PROCEDURE — 76999 ECHO EXAMINATION PROCEDURE: CPT

## 2023-04-27 ENCOUNTER — CARE COORDINATION (OUTPATIENT)
Dept: CASE MANAGEMENT | Age: 48
End: 2023-04-27

## 2023-04-27 NOTE — CARE COORDINATION
Remote Alert Monitoring Note      Date/Time:  2023 8:30 AM  Patient Current Location: [facility]    LPN contacted patient by telephone regarding red alert received for survey response (indicating increase in cough and wheezing over the past 24 hrs). Verified patients name and  as identifiers. Background: RPM for HTN, COPD, Asthma  Refer to 911 immediately if:  Patient unresponsive or unable to provide history  Change in cognition or sudden confusion  Patient unable to respond in complete sentences  Intense chest pain/tightness  Any concern for any clinical emergency  Red Alert: Provider response time of 1 hr required for any red alert requiring intervention  Yellow Alert: Provider response time of 3hr required for any escalated yellow alert        Clinical Interventions: Reviewed and followed up on alerts and treatments-LPN contacted pt in regards to RPM red for survey response indicating that pt had an increase in cough and wheezing over the past 24 hrs. Pt was recently diagnosed with PNA, and was treated and released from [facility]. Pt stated that she has an increase in cough, productive, with \"grayish sputum. \" Pt is drinking plenty of water to help hydrate and thin out the mucus. Pt stated that she is also using 2L of O2, which she feels is helping her significantly, and has helped to alleviate the wheezing. Pt's SpO2 is 99% at this time, with HR of 99 bpm, per metrics in HRS. Pt stated that she does not feel that her symptoms warrant a return to the ED at this time. Pt stated that she is taking her abx and all other meds as prescribed. Pt denied fever. Pt did agree to return to the ED if symptoms become severe and worsen. Writer will route to PCP and ACM to advise. Plan/Follow Up: Will continue to review, monitor and address alerts with follow up based on severity of symptoms and risk factors.       Vivi Hutchins LPN, [facility] 30: [phone]  Current Patient Metrics ---- Activity: 20 mins Blood

## 2023-04-28 ENCOUNTER — TELEPHONE (OUTPATIENT)
Dept: CARE COORDINATION | Age: 48
End: 2023-04-28

## 2023-04-28 ENCOUNTER — CARE COORDINATION (OUTPATIENT)
Dept: CARE COORDINATION | Age: 48
End: 2023-04-28

## 2023-04-28 ENCOUNTER — TELEPHONE (OUTPATIENT)
Dept: FAMILY MEDICINE CLINIC | Age: 48
End: 2023-04-28

## 2023-04-28 LAB
A/G RATIO: 1.9 G/DL (ref 1–2.5)
ALBUMIN: 4.5 G/DL (ref 3.5–5)
ALP BLD-CCNC: 120 U/L (ref 38–126)
ALT SERPL-CCNC: 25 U/L (ref 0–34)
AMORPHOUS: ABNORMAL
ANION GAP SERPL CALCULATED.3IONS-SCNC: 10.8 MMOL/L (ref 8–16)
AST SERPL-CCNC: 23 U/L (ref 14–36)
BACTERIA, URINE: ABNORMAL
BILIRUB SERPL-MCNC: 0.5 MG/DL (ref 0.2–1.3)
BILIRUBIN, URINE: NEGATIVE
BLOOD, URINE: NEGATIVE
BUN BLDV-MCNC: 11 MG/DL (ref 7–17)
CALCIUM SERPL-MCNC: 9.9 MG/DL (ref 8.6–10.3)
CASTS: NEGATIVE
CEA: 7.2 NG/ML (ref 0–3)
CHLORIDE BLD-SCNC: 107 MMOL/L (ref 98–107)
CLARITY: CLEAR
CO2: 25 MMOL/L (ref 22–30)
COLOR, URINE: YELLOW
CREAT SERPL-MCNC: 0.8 MG/DL (ref 0.52–1.04)
CRYSTALS, UA: NEGATIVE
EPITHELIAL CELLS, UA: ABNORMAL
FERRITIN: 20.8 NG/ML (ref 6.2–137)
GFR, ESTIMATED: 77
GLOBULIN: 2.4 G/DL (ref 2–3.5)
GLUCOSE UA: NEGATIVE
GLUCOSE: 99 MG/DL (ref 74–100)
INR BLD: 0.96 (ref 2–4)
IRON: 122 UG/DL (ref 37–170)
KETONES, URINE: NEGATIVE
LEUKOCYTE ESTERASE, URINE: NEGATIVE
MUCUS, URINE: NEGATIVE
NITRITE, URINE: NEGATIVE
PH, URINE: 8
POTASSIUM SERPL-SCNC: 4.8 MMOL/L (ref 3.4–5.1)
PROTEIN UA: NEGATIVE MG/DL
PROTHROMBIN TIME: 9.9 SECS. (ref 9.4–12.2)
RBC URINE: ABNORMAL
SODIUM BLD-SCNC: 138 MMOL/L (ref 137–145)
SPECIFIC GRAVITY UA: 1.01 (ref 1–1.03)
TOTAL IRON BINDING CAPACITY: 368 UG/DL (ref 265–497)
TOTAL PROTEIN: 6.9 G/DL (ref 6.3–8.2)
TRICHOMONAS, URINE: NEGATIVE
TSH SERPL DL<=0.05 MIU/L-ACNC: 1.01 MIU/L (ref 0.47–4.68)
UROBILINOGEN: 0.2 MG/DL (ref 0.2–1)
VITAMIN D 25-HYDROXY: 42 NG/ML (ref 30–100)
WBC URINE: ABNORMAL
YEAST, URINE: NEGATIVE

## 2023-04-28 NOTE — TELEPHONE ENCOUNTER
Given her other medications, I would not recommend changing to Phenergan.   There is a significant likelihood of drug to drug interactions and excessive sedation associated with that

## 2023-04-28 NOTE — CARE COORDINATION
Ambulatory Care Coordination Note  4/28/2023    Challenges to be reviewed by the provider   Additional needs identified to be addressed with provider: Yes  Patient requesting phenergan-This ACM contacted PCP office via phone regarding request and noted medication denied d/t risk of potential interactions and side effects; notified patient and reviewed nausea care instructions. Patient reports she will stick to the zofran and continue fluids and mild foods. Method of communication with provider: phone. Patient reports she is feeling better today and she is currently at a provider appointment  She further reports she has been holding down fluids but not eating much  She is taking zofran for nausea and requesting phenergan; contacted PCP office    Update:  Noted PCP declined request for phenergan; notified patient  Patient reports she is \"ok\" with this and will continue to take the zofran as prescribed  She further reports she is currently at the grocery store and will  the fluids/food we reviewed. Reviewed Nausea care instructions     Reviewed and sent the following via My Chart  Pneumonia Care Instructions  Nausea Care Instructions  Pneumonia Zones    ACM: Josh Morales RN    Offered patient enrollment in the Remote Patient Monitoring (RPM) program for in-home monitoring: Yes, patient already enrolled.           Care Coordination Interventions    Referral from Primary Care Provider: No  Suggested Interventions and Community Resources  [facility]: Completed  Transportation Support: Completed          Goals Addressed    None         Future Appointments   Date Time Provider Kana Mcmullen   5/24/2023  8:00 PM Kasie 34 2 [facility]   7/31/2023 11:20 AM MD MELVI Pearson [facility]

## 2023-04-28 NOTE — TELEPHONE ENCOUNTER
SW placed call to patient to inquire about ACP documents. Patient confirmed she printed them out and will go to the bank today to have them notarized. She stated she does not need help filling them out. She named her son as primary agent and fiance as secondary agent. SW encouraged her to provide copies to PCP when able. No further needs at this time.     Alicia CUMMINGS, [facility]     [phone]

## 2023-04-28 NOTE — TELEPHONE ENCOUNTER
Pt was wondering if she could get an order for Phenergan, she is currently taking Zofran but it's not helping.

## 2023-04-29 LAB
CA 19-9: 8 U/ML (ref 0–35)
HEPATITIS C ANTIBODY: NON REACTIVE

## 2023-05-01 ENCOUNTER — TELEPHONE (OUTPATIENT)
Dept: FAMILY MEDICINE CLINIC | Age: 48
End: 2023-05-01

## 2023-05-01 ENCOUNTER — CARE COORDINATION (OUTPATIENT)
Dept: CASE MANAGEMENT | Age: 48
End: 2023-05-01

## 2023-05-01 LAB
ANGIOTENSIN CONVERTING ENZYME: 49 U/L (ref 14–82)
FREE LAMBDA LIGHT CHAINS: 11.7 MG/L (ref 5.7–26.3)
KAPPA/LAMBDA FREE LIGHT CHAINS: 13.4 MG/L (ref 3.3–19.4)
KAPPA/LAMBDA RATIO: 1.15 (ref 0.26–1.65)
Lab: NORMAL
URINE CULTURE, ROUTINE: NORMAL

## 2023-05-01 NOTE — TELEPHONE ENCOUNTER
Paged by patient regarding low blood pressures of 97/77. Patient takes carvedilol twice a day. Discussed holding blood pressures for systolic blood pressure below 110. Patient has no chest pain, shortness of breath lightheadedness or dizziness. Discussed go to the ER if worsening symptoms develop. An understanding of the plan was verbalized.

## 2023-05-03 ENCOUNTER — CARE COORDINATION (OUTPATIENT)
Dept: CARE COORDINATION | Age: 48
End: 2023-05-03

## 2023-05-03 NOTE — CARE COORDINATION
Ambulatory Care Coordination Note  5/3/2023    Patient Current Location: [facility]     ACM contacted the patient by telephone. Verified name and  with patient as identifiers. Provided introduction to self, and explanation of the ACM role. Challenges to be reviewed by the provider   Additional needs identified to be addressed with provider: Yes    Patient has appointment with you on 23 will you need to see her sooner re: Recent diagnosis of PNA. PLAN:  Continue with smoking cessation; patient reports last cigarette was 2 weeks ago  Continue RPM monitoring  Keep appointment with [employer] re: rental assistance           Method of communication with provider: chart routing. ACM: Creola Lefort, RN    Assessed SDOH  Patient has appointment with [employer] for rental assistance 23 at 1430  She is utilizing food pantries at the end of the month  Medicaid is covering her transportation for doctor appointments    Offered patient enrollment in the Remote Patient Monitoring (RPM) program for in-home monitoring: Yes, patient already enrolled.     COPD Assessment    Does the patient understand envrionmental exposure?: Yes  Is the patient able to verbalize Rescue vs. Long Acting medications?: No  Does the patient have a nebulizer?: Yes  Does the patient use a space with inhaled medications?: Yes            Symptoms:     Symptom course: improving  Change in chronic cough?:  (Comment: getting better)  Change in sputum?:  (Comment: getting better)  Sputum characteristics: Clear  Self Monitoring - SaO2: Yes (Comment: 97%)  Have you had a recent diagnosis of pneumonia either by PCP or at a hospital?: Yes by PCP  Have you seen your PCP for follow up or do you have an appointment scheduled?: No  Are you taking your antibiotics as prescribed?: Yes  Symptom Course: improving        Care Coordination Interventions    Referral from Primary Care Provider: No  Suggested Interventions and [address] [place] [address] East:

## 2023-05-03 NOTE — TELEPHONE ENCOUNTER
Recommend hospital follow-up due to recent diagnosis of pneumonia. This could be with Tray Castro to add temperature to her monitoring program

## 2023-05-04 ENCOUNTER — CARE COORDINATION (OUTPATIENT)
Dept: CASE MANAGEMENT | Age: 48
End: 2023-05-04

## 2023-05-04 ENCOUNTER — OFFICE VISIT (OUTPATIENT)
Dept: FAMILY MEDICINE CLINIC | Age: 48
End: 2023-05-04
Payer: MEDICARE

## 2023-05-04 VITALS
DIASTOLIC BLOOD PRESSURE: 84 MMHG | TEMPERATURE: 97.6 F | HEART RATE: 95 BPM | WEIGHT: 205 LBS | BODY MASS INDEX: 35.19 KG/M2 | SYSTOLIC BLOOD PRESSURE: 110 MMHG | OXYGEN SATURATION: 95 %

## 2023-05-04 DIAGNOSIS — J43.9 PULMONARY EMPHYSEMA, UNSPECIFIED EMPHYSEMA TYPE (HCC): ICD-10-CM

## 2023-05-04 DIAGNOSIS — R91.8 INFILTRATE OF LEFT LUNG PRESENT ON CHEST X-RAY: ICD-10-CM

## 2023-05-04 DIAGNOSIS — Z09 HOSPITAL DISCHARGE FOLLOW-UP: Primary | ICD-10-CM

## 2023-05-04 PROCEDURE — 3074F SYST BP LT 130 MM HG: CPT

## 2023-05-04 PROCEDURE — 3078F DIAST BP <80 MM HG: CPT

## 2023-05-04 PROCEDURE — 99214 OFFICE O/P EST MOD 30 MIN: CPT

## 2023-05-04 RX ORDER — PREDNISONE 10 MG/1
10 TABLET ORAL 2 TIMES DAILY
Qty: 10 TABLET | Refills: 0 | Status: SHIPPED | OUTPATIENT
Start: 2023-05-04 | End: 2023-05-09

## 2023-05-04 ASSESSMENT — ENCOUNTER SYMPTOMS
WHEEZING: 1
COUGH: 1
SHORTNESS OF BREATH: 1

## 2023-05-04 NOTE — CARE COORDINATION
Remote Alert Monitoring Note      Date/Time:  2023 1:35 PM  Patient Current Location: [facility]    LPN contacted patient by telephone regarding red alert received for survey response (indicating an increase in coughing and wheezing in the past 24 hrs). Verified patients name and  as identifiers. Background: RPM for HTN, COPD, Asthma  Refer to 911 immediately if:  Patient unresponsive or unable to provide history  Change in cognition or sudden confusion  Patient unable to respond in complete sentences  Intense chest pain/tightness  Any concern for any clinical emergency  Red Alert: Provider response time of 1 hr required for any red alert requiring intervention  Yellow Alert: Provider response time of 3hr required for any escalated yellow alert        Clinical Interventions: Reviewed and followed up on alerts and treatments-LPN contacted pt in regards to RPM red alert for survey response indicating that pt had an increase in coughing and or wheezing over the past 24 hrs. Pt stated that she has had an increase in wheezing and chest tightening. Pt is afebrile and PO is 94%/81 bpm. Will route to PCP and ACM to advise. Plan/Follow Up: Will continue to review, monitor and address alerts with follow up based on severity of symptoms and risk factors.       Judy Jiménez LPN, [facility]  PH: [phone]    -- Current Patient Metrics ---- Activity: 20 mins Blood Pressure: 107/77, 80bpm Pulseox: 94%, 81bpm Survey: C Note Created at: 2023 01:38 PM ET ---- Time-Spent: 10 minutes 0 seconds

## 2023-05-04 NOTE — CARE COORDINATION
Ambulatory Care Coordination Note  2023    Patient Current Location:  Home: [address] [place] [address]     ACM contacted the patient by telephone. Verified name and  with patient as identifiers. Provided introduction to self, and explanation of the ACM role. Challenges to be reviewed by the provider   Additional needs identified to be addressed with provider: No    Assisted patient with scheduling same day appointment with PCP office    PLAN:  Patient has same day appointment with PCP at 1600  She is to go to ED if symptoms worsen           Method of communication with provider: phone. ACM: Marah Dia RN    Noted RPM alert and noted PCP's response  This ACM contacted patient  Patient reports she has had an increase in cough and wheezing over the last 24 hours  She reports she has been using her nebulizer every 6 hours and this has been relieving the wheezing and \"tightness\"  This ACM reviewed chart and noted patient's albuterol nebulizer is ordered PRN every 4 hours ; notified patient  Patient reports she used her nebulizer at 10:00 am this morning. Patient verbalized understanding she can use the nebulizer treatment every four hours and can have a treatment now. Assisted patient with scheduling same day appointment with PCP  Patient contacted [employer] transportation while on the phone with this ACM and scheduled a ride to today's appointment      Offered patient enrollment in the Remote Patient Monitoring (RPM) program for in-home monitoring: Yes, patient already enrolled. Care Coordination Interventions    Referral from Primary Care Provider: No  Suggested Interventions and Community Resources  [facility] Completed  Transportation Support: Completed          Goals Addressed                      This Visit's Progress       Care Coordination      Conditions and Symptoms   Improving      I will follow my Zone Management tool to seek urgent or emergent care.     Barriers:

## 2023-05-04 NOTE — PROGRESS NOTES
Take 1 tablet by mouth 2 times daily for 5 days Yes BELA Knight CNP   tiotropium (SPIRIVA RESPIMAT) 2.5 MCG/ACT AERS inhaler INHALE 2 PUFFS DAILY Yes Dana Malone MD   budesonide-formoterol (SYMBICORT) 160-4.5 MCG/ACT AERO Inhale 2 puffs into the lungs 2 times daily Rinse mouth with water after use Yes Telly Chau MD   albuterol sulfate HFA (PROVENTIL;VENTOLIN;PROAIR) 108 (90 Base) MCG/ACT inhaler Inhale 2 puffs into the lungs every 6 hours as needed for Shortness of Breath Yes Telly Chau MD   HYDROcodone-acetaminophen ([facility]) 5-325 MG per tablet  Yes Historical Provider, MD   cyclobenzaprine (FLEXERIL) 10 MG tablet  Yes Historical Provider, MD   furosemide (LASIX) 40 MG tablet TAKE 1 TABLET BY MOUTH DAILY Yes Dana Malone MD   PROAIR RESPICLICK 244 (90 Base) MCG/ACT aerosol powder inhalation INHALE 2PUFFS INTO THE LUNGS EVERY SIX HOURS AS NEEDED FOR SHORTNESS OF BREATH Yes BELA Knight CNP   carvedilol (COREG) 6.25 MG tablet Take 1 tablet by mouth 2 times daily Yes Dana Malone MD   atorvastatin (LIPITOR) 10 MG tablet Take 1 tablet by mouth daily Yes Dana Malone MD   ibuprofen (ADVIL;MOTRIN) 600 MG tablet Take 1 tablet by mouth 3 times daily as needed for Pain Yes Dana Malone MD   levocetirizine (XYZAL) 5 MG tablet Take 1 tablet by mouth nightly Yes BELA Knight CNP   fluticasone (FLONASE) 50 MCG/ACT nasal spray 1 spray by Each Nostril route daily Yes BELA Knight CNP   aspirin 81 MG EC tablet Take 1 tablet by mouth daily Yes Dana Malone MD   omeprazole (PRILOSEC) 40 MG delayed release capsule Take 1 capsule by mouth 2 times daily Yes Dana Malone MD   albuterol (PROVENTIL) (2.5 MG/3ML) 0.083% nebulizer solution Take 3 mLs by nebulization every 4 hours as needed for Wheezing Yes Dana Malone MD   lamoTRIgine (LAMICTAL) 25 MG tablet Take 2 tablets by mouth daily Yes Dana Malone MD   docusate sodium (COLACE) 100 MG capsule TAKE 1

## 2023-05-10 ENCOUNTER — CARE COORDINATION (OUTPATIENT)
Dept: CASE MANAGEMENT | Age: 48
End: 2023-05-10

## 2023-05-10 NOTE — CARE COORDINATION
Date/Time:  5/10/2023 4:12 PM    LPN attempted to reach patient by telephone regarding red alert in remote symptom monitoring program. Left HIPPA compliant message requesting a return call. Will attempt to reach patient again.      Lyubov Sullivan LPN, [facility]  PH: [phone]    Current Patient Metrics ---- Activity: - mins Blood Pressure: -/-, -bpm Pulseox: -%, -bpm Survey: - Temperature: - Note Created at: 05/10/2023 02:57 PM ET ---- Time-Spent: 3 minutes 0 seconds

## 2023-05-11 ENCOUNTER — CARE COORDINATION (OUTPATIENT)
Dept: CASE MANAGEMENT | Age: 48
End: 2023-05-11

## 2023-05-11 ENCOUNTER — CARE COORDINATION (OUTPATIENT)
Dept: CARE COORDINATION | Age: 48
End: 2023-05-11

## 2023-05-11 ASSESSMENT — ENCOUNTER SYMPTOMS: DYSPNEA ASSOCIATED WITH: EXERTION

## 2023-05-11 NOTE — CARE COORDINATION
Remote Alert Monitoring Note      Date/Time:  2023 10:12 AM  Patient Current Location: [facility]    LPN contacted patient by telephone regarding red alert received for blood pressure heart rate (128 bpm). Verified patients name and  as identifiers. Background: RPM for COPD, HTN, Asthma  Refer to 911 immediately if:  Patient unresponsive or unable to provide history  Change in cognition or sudden confusion  Patient unable to respond in complete sentences  Intense chest pain/tightness  Any concern for any clinical emergency  Red Alert: Provider response time of 1 hr required for any red alert requiring intervention  Yellow Alert: Provider response time of 3hr required for any escalated yellow alert    BP Triage  Are you having any Chest Pain? no   Are you having any Shortness of Breath? no   Do you have a headache or have any vision changes? no   Are you having any numbness or tingling? no   Are you having any other health concerns or issues? no       Clinical Interventions: Reviewed and followed up on alerts and treatments-LPN cotacted pt in regards to RPM red alert for BP HR of 128 bpm. Pt denied any worrisome and or worsening symptoms today. Pt updated BP HR and it is 112 bpm, WNL for this pt. Plan/Follow Up: Will continue to review, monitor and address alerts with follow up based on severity of symptoms and risk factors.       Dottie Gongora LPN, [facility]  PH: [phone]    Current Patient Metrics ---- Activity: - mins Blood Pressure: 104/80, 112bpm Pulseox: 95%, 116bpm Survey: C Temperature: 97.9 Note Created at: 2023 10:56 AM ET ---- Time-Spent: 12 minutes 0 seconds

## 2023-05-11 NOTE — CARE COORDINATION
Ambulatory Care Coordination Note  5/11/2023    Patient Current Location:  Home: [address] [place] [address]     ACM contacted the patient by telephone. Verified name with patient as identifiers. Challenges to be reviewed by the provider   Additional needs identified to be addressed with provider: No  none               Method of communication with provider: none. ACM: Jasmin Marrero RN    Patient reports she is continuing to improve still feels a little tired  Appetite has improved. She has stopped drinking coffee and energy drinks  Patient reports she was just getting ready to go for a walk when this ACM called  Reviewed COPD/PNA zones  Patient verbalizes awareness she will need a repeat C-Xray next week    Plan:  Continue RPM monitoring  No change in plan of care  Patient agrees to get C-Xray next week        Offered patient enrollment in the Remote Patient Monitoring (RPM) program for in-home monitoring: Yes, patient already enrolled. General Assessment    Do you have any symptoms that are causing concern?: No        COPD Assessment    Does the patient understand envrionmental exposure?: Yes  Is the patient able to verbalize Rescue vs. Long Acting medications?: No  Does the patient have a nebulizer?: Yes  Does the patient use a space with inhaled medications?: Yes            Symptoms:     Symptom course: improving  Breathlessness: exertion  Increase use of rapid acting/rescue inhaled medications?: No  Change in chronic cough?: No/At Baseline  Change in sputum?: No/At Baseline  Sputum characteristics: Clear  Self Monitoring - SaO2: Yes       Care Coordination Interventions    Referral from Primary Care Provider: No  Suggested Interventions and Community Resources  Behavorial Health: Completed  Transportation Support: Completed  Zone Management Tools:  In Process          Goals Addressed                   This Visit's Progress       Care Coordination     Conditions and Symptoms   Improving     I

## 2023-05-16 ENCOUNTER — CARE COORDINATION (OUTPATIENT)
Dept: CARE COORDINATION | Age: 48
End: 2023-05-16

## 2023-05-16 NOTE — CARE COORDINATION
Ambulatory Care Coordination Note  2023    Patient Current Location:  Home: [address] [place] [address]     ACM contacted the patient by telephone. Verified name and  with patient as identifiers. Provided introduction to self, and explanation of the ACM role. Challenges to be reviewed by the provider   Additional needs identified to be addressed with provider: Yes    Patient c/o fatigue    medications-Patient contacted this ACM to inform this ACM she discontinued  most of her medications approximately 2-3 weeks ago because she was \"trying to get healthy\"  Patient further reports that her counselor told her this was not healthy and she should notify her PCP. Patient restarted the Coreg, ASA and Atorvastatin prior to calling this ACM. Patient denies chest pain, pressure, SOB, palpitations, diaphoresis or feeling light/headed or dizzy. Patient changed the dates she d/c the medications several times during this call; she reported she d/c meds 8 days ago to 3 weeks ago               Method of communication with provider: chart routing. ACM: Kimberly Luis RN    See above  Patient reports counselor aware she d/c Lamictal and Lexapro; she has not restarted these medications  Thanked patient for notifying this ACM regarding d/c medications  Explained rationale for always notifying prescribing provider prior to d/c, changing or restarting medications; patient verbalized understanding    Obtained blood pressures from RPM  Blood Pressures & Heart Rates        Day BP HR        23 116/92 88   5/15/23 102/89 89   23 136/75 99   23 97/67 103   23 108/77 96   23 93/67 116   5/10/23 118/105 101   23 238/183 retake 129/72 103   23 143/122 101   23 113/89 105   23 124/71 101   23 123/86 99                    Offered patient enrollment in the Remote Patient Monitoring (RPM) program for in-home monitoring: Yes, patient already enrolled.       Care Coordination

## 2023-05-17 NOTE — PROGRESS NOTES
5/17/23 6:05 PM     Remote Patient Monitoring Change to Monitoring Parameters    Patient currently being managed with remote patient monitoring for COPD, HTN, and Asthma. Request received to add temperature monitoring to pt's RPM care plan in order to accommodate patient's baseline measurements, or associated changes in patient's status. Pt has recently been dx'd with PNA and PCP is in agreement to add this additional monitoring. The change has already been made to pt's RPM care plan and this note is simply to inform the team of this. See care coordination encounters for additional details.      Kimberley Floyd DNP, FNP-C, Remote Patient Monitoring NP, (Ph) [phone]

## 2023-05-19 ENCOUNTER — CARE COORDINATION (OUTPATIENT)
Dept: CASE MANAGEMENT | Age: 48
End: 2023-05-19

## 2023-05-19 NOTE — CARE COORDINATION
Date/Time:  5/19/2023 8:42 AM    LPN attempted to reach patient by telephone regarding red alert in remote symptom monitoring program for BP of 89/65, HR  89 bpm. Left HIPPA compliant message requesting a return call. Will attempt to reach patient again. 15:03- Writer unable to reach pt and pt has not returned writer's call. Pt did update BP. BP is still low at 88/65,  bpm. Please note that pt's BP readings on last night in HRS were 68/52 with HR of 105 bpm and 80/63 with HR of 112 bpm.    15:32- Writer contacted pt again and pt answered. Pt advised of PCP's advice to be evaluated in the ED. Pt verbalized understanding.      Elise Vazquez LPN, [facility]  PH: [phone]     Current Patient Metrics ---- Activity: - mins Blood Pressure: 89/65, 89bpm Pulseox: 92%, 89bpm Survey: C Temperature: 98.7 Note Created at: 05/19/2023 08:43 AM ET ---- Time-Spent: 3 minutes 0 seconds

## 2023-05-22 ENCOUNTER — CARE COORDINATION (OUTPATIENT)
Dept: CARE COORDINATION | Age: 48
End: 2023-05-22

## 2023-05-22 NOTE — CARE COORDINATION
Ambulatory Care Coordination Note  2023    Patient Current Location:  Home: [address] [place] [address]     ACM contacted the patient by telephone. Verified name and  with patient as identifiers. Provided introduction to self, and explanation of the ACM role. Challenges to be reviewed by the provider   Additional needs identified to be addressed with provider: No  none               Method of communication with provider: none. ACM: Lovely Dominguez, RN    Spoke briefly with patient; patient was sleeping when this ACM called  Patient reports she did go to the ED last 23  Patient denies any concerns. Patient has appointment tomorrow with PCP  She agrees to f/u call later this week. Offered patient enrollment in the Remote Patient Monitoring (RPM) program for in-home monitoring: Yes, patient already enrolled. General Assessment    Do you have any symptoms that are causing concern?: No            Care Coordination Interventions    Referral from Primary Care Provider: No  Suggested Interventions and Community Resources  [facility]: Completed  Transportation Support: Completed  Zone Management Tools: In Process          Goals Addressed                   This Visit's Progress       Care Coordination     Conditions and Symptoms   On track     I will follow my Zone Management tool to seek urgent or emergent care.     Barriers: none  Plan for overcoming my barriers: N/A  Confidence: 10/10  Anticipated Goal Completion Date: 2/3/22                Future Appointments   Date Time Provider Kana Mcmullen   2023  3:00 PM MD MELVI [place] [facility]   2023  8:00 PM Kasie [address] [facility]   2023  2:20 PM Tadeo Medley MD [address] [facility]   2023 11:20 AM MD MELVI [place] [facility]

## 2023-05-24 ENCOUNTER — HOSPITAL ENCOUNTER (OUTPATIENT)
Dept: SLEEP CENTER | Age: 48
Discharge: HOME | End: 2023-05-26
Payer: MEDICARE

## 2023-05-24 DIAGNOSIS — G47.33 OSA (OBSTRUCTIVE SLEEP APNEA): ICD-10-CM

## 2023-05-24 PROCEDURE — 95810 POLYSOM 6/> YRS 4/> PARAM: CPT

## 2023-05-25 ENCOUNTER — CARE COORDINATION (OUTPATIENT)
Dept: CARE COORDINATION | Age: 48
End: 2023-05-25

## 2023-05-25 ENCOUNTER — CARE COORDINATION (OUTPATIENT)
Dept: CASE MANAGEMENT | Age: 48
End: 2023-05-25

## 2023-05-25 NOTE — CARE COORDINATION
Remote Alert Monitoring Note      Date/Time:  2023 12:08 PM  Patient Current Location: [facility]    LPN contacted patient by telephone regarding red alert received for pulse ox heart rate (123 bpm). Verified patients name and  as identifiers. Background: RPM for HTN, COPD, Asthma  Refer to 911 immediately if:  Patient unresponsive or unable to provide history  Change in cognition or sudden confusion  Patient unable to respond in complete sentences  Intense chest pain/tightness  Any concern for any clinical emergency  Red Alert: Provider response time of 1 hr required for any red alert requiring intervention  Yellow Alert: Provider response time of 3hr required for any escalated yellow alert    HR Triage  Are you having any Chest Pain? no   Are you having any Shortness of Breath? no   Are you having any dizziness? no   Are you feeling more fatigued or tired than normal? no   Are you having any other health concerns or issues? Yes, pt just stated \"Asthma\" and would not elaborate. Clinical Interventions: Reviewed and followed up on alerts and treatments-LPN contacted pt in regards to RPM red alert for PO HR of 123 bpm. Pt denied chest pain and dizziness. Pt not very engaged during call. Writer asked pt to advise of any specific issues or symptoms and pt replied with one word, \"asthma. \" Writer asked pt if she has used any of her breathing treatments today and pt replied, \"no. \" Writer then asked pt if she  felt SOB was severe, and if pt needed immediate medical attention and pt replied \"no. \" Writer asked pt if she would recheck pulse ox and pt disconnected the call. Writer will route to [facility] to advise. Plan/Follow Up: Will continue to review, monitor and address alerts with follow up based on severity of symptoms and risk factors.       Shen Irwin LPN, [facility]  PH: [phone]    Current Patient Metrics ---- Activity: - mins Blood Pressure: -/-, -bpm Pulseox: 98%, 123bpm Survey: - Temperature: - Note

## 2023-05-30 ENCOUNTER — CARE COORDINATION (OUTPATIENT)
Dept: CASE MANAGEMENT | Age: 48
End: 2023-05-30

## 2023-05-30 NOTE — CARE COORDINATION
Date/Time:  5/30/2023 1:36 PM    LPN attempted to reach patient by telephone x 2 regarding red alert in remote symptom monitoring program for BP alert. Left HIPPA compliant message requesting a return call. Routed to Vito Lea to advise per RPM protocol. BP updated to 95/82, WNL for this pt. Pt did not return call. 15:00.      Daniella Herman LPN, [facility]  PH: [phone]    Current Patient Metrics ---- Activity: - mins Blood Pressure: 138/128, 114bpm Pulseox: -%, -bpm Survey: - Temperature: - Note Created at: 05/30/2023 01:37 PM ET ---- Time-Spent: 5 minutes 0 seconds

## 2023-05-31 ENCOUNTER — TELEPHONE (OUTPATIENT)
Dept: PULMONOLOGY | Age: 48
End: 2023-05-31

## 2023-05-31 ENCOUNTER — CARE COORDINATION (OUTPATIENT)
Dept: CARE COORDINATION | Age: 48
End: 2023-05-31

## 2023-05-31 NOTE — CARE COORDINATION
Ambulatory Care Coordination Note  2023    Patient Current Location:  Home: [address] [place] [address]     ACM contacted the patient by telephone. Verified name and  with patient as identifiers. Provided introduction to self, and explanation of the ACM role. Challenges to be reviewed by the provider   Additional needs identified to be addressed with provider: No  none      PLAN:  No change in plan of care  Reminded patient there to get CXR  Continue with smoking cessation  Continue to follow Zone Tools  Next f/u review COPD            Method of communication with provider: staff message. ACM: Monroe Tejeda RN    Reviewed medications      Offered patient enrollment in the Remote Patient Monitoring (RPM) program for in-home monitoring: Yes, patient already enrolled. General Assessment    Do you have any symptoms that are causing concern?: No        Patient denies fever, cough, sob, chest pain, diaphoresis or feeling light headed or dizziness  Reviewed COPD symptoms; patient denies symptoms  Patient  reports she misplaced the pulse oximeter and she is unable to check her oxygen level at this time  Per RPM patient's vital signs are in green zone: BP: 104/84 and     COPD Assessment    Does the patient understand envrionmental exposure?: Yes  Is the patient able to verbalize Rescue vs. Long Acting medications?: No  Does the patient have a nebulizer?: Yes  Does the patient use a space with inhaled medications?: Yes     No patient-reported symptoms         Symptoms:               Care Coordination Interventions    Referral from Primary Care Provider: No  Suggested Interventions and [facility] [address] [place]: Completed  Transportation Support: Completed  Zone Management Tools:  In Process          Goals Addressed                      This Visit's Progress       Care Coordination      Conditions and Symptoms   Improving      I will follow my Zone Management tool to seek urgent or

## 2023-05-31 NOTE — CARE COORDINATION
Received notification from [facility] that patient misplaced RPM pulse ox and will need a replacement sent. SS submitted for replacement with patient direct. Will follow up with tracking.

## 2023-05-31 NOTE — TELEPHONE ENCOUNTER
----- Message from Lola Kim MD sent at 5/30/2023  5:02 PM EDT -----  Please put the patient for follow up this week to review the results and options   ----- Message -----  From: Verna Bobby MA  Sent: 5/30/2023  10:02 AM EDT  To: Lola Kim MD    Please review. No upcoming appts.

## 2023-06-02 ENCOUNTER — TELEPHONE (OUTPATIENT)
Dept: PULMONOLOGY | Age: 48
End: 2023-06-02

## 2023-06-02 NOTE — TELEPHONE ENCOUNTER
Patient did not show for OV  with Adrien Chavez on 6/2/23. Reason:  NA    This is patient's first no show. Patient was ano show on: NA.      Patient did not reschedule.   Reschedule date:  NA

## 2023-06-06 ENCOUNTER — CARE COORDINATION (OUTPATIENT)
Dept: CASE MANAGEMENT | Age: 48
End: 2023-06-06

## 2023-06-06 NOTE — CARE COORDINATION
Remote Patient Monitoring Note      Date/Time:  2023 4:26 PM  Patient Current Location: [facility]  LPN contacted patient by telephone regarding yellow alert received for missing metircs x 2 days. Verified patients name and  as identifiers. Background: RPM for COPD, HTN, Asthma    Clinical Interventions: Reviewed and followed up on alerts and treatments-LPN contacted pt in regards to missing metrics x 2 days. Pt did not provide a reason for non compliance. Will resume tomorrow, per pt. Plan/Follow Up: Will continue to review, monitor and address alerts with follow up based on severity of symptoms and risk factors.        Sai Silva LPN, [facility]  PH: [phone]    Writer noted manual entries, advises HRS IT.         ---- Current Patient Metrics ---- Activity: - mins Blood Pressure: -/-, -bpm Pulseox: -%, -bpm Survey: - Temperature: - Note Created at: 2023 04:28 PM ET ---- Time-Spent: 5 minutes 0 seconds

## 2023-06-07 ENCOUNTER — CARE COORDINATION (OUTPATIENT)
Dept: CASE MANAGEMENT | Age: 48
End: 2023-06-07

## 2023-06-07 ENCOUNTER — CARE COORDINATION (OUTPATIENT)
Dept: CARE COORDINATION | Age: 48
End: 2023-06-07

## 2023-06-07 LAB
A/G RATIO: 1.6 G/DL (ref 1–2.5)
ALBUMIN: 3.9 G/DL (ref 3.5–5)
ALP BLD-CCNC: 92 U/L (ref 38–126)
ALT SERPL-CCNC: 20 U/L (ref 0–34)
ANION GAP SERPL CALCULATED.3IONS-SCNC: 8.3 MMOL/L (ref 8–16)
AST SERPL-CCNC: 21 U/L (ref 14–36)
BILIRUB SERPL-MCNC: 0.2 MG/DL (ref 0.2–1.3)
BUN BLDV-MCNC: 11 MG/DL (ref 7–17)
CALCIUM SERPL-MCNC: 8.9 MG/DL (ref 8.6–10.3)
CHLORIDE BLD-SCNC: 112 MMOL/L (ref 98–107)
CO2: 23 MMOL/L (ref 22–30)
CREAT SERPL-MCNC: 0.9 MG/DL (ref 0.52–1.04)
CREATINE KINASE ISOENZYMES, SER/PLAS: 51.9 U/L (ref 30–135)
GFR, ESTIMATED: 67
GLOBULIN: 2.6 G/DL (ref 2–3.5)
GLUCOSE: 102 MG/DL (ref 74–100)
INR BLD: 0.96 (ref 2–4)
LIPASE: 100 U/L (ref 23–300)
POTASSIUM SERPL-SCNC: 3.3 MMOL/L (ref 3.4–5.1)
PROTHROMBIN TIME: 9.9 SECS. (ref 9.4–12.2)
SODIUM BLD-SCNC: 140 MMOL/L (ref 137–145)
TOTAL PROTEIN: 6.5 G/DL (ref 6.3–8.2)
TROPONIN I: <0.012 NG/ML (ref 0–0.03)
TROPONIN I: <0.012 NG/ML (ref 0–0.03)

## 2023-06-07 NOTE — CARE COORDINATION
Date/Time:  6/7/2023 10:34 AM    LPN attempted to reach patient and EC by telephone x 2 regarding red alert in remote symptom monitoring program for BP reading. Left HIPPA compliant message requesting a return call. Will route to ACM to advise per protocol.     Mack Woodard LPN, [facility]  PH: [phone]     Current Patient Metrics ---- Activity: - mins Blood Pressure: 84/60, 100bpm Pulseox: -%, -bpm Survey: - Temperature: - Note Created at: 06/07/2023 10:35 AM ET ---- Time-Spent: 5 minutes 0 seconds

## 2023-06-07 NOTE — CARE COORDINATION
Ambulatory Care Coordination Note  2023    Patient Current Location:  Home: [address] [place] [address]     ACM contacted the patient by telephone. Verified name and  with patient as identifiers. Provided introduction to self, and explanation of the ACM role. Challenges to be reviewed by the provider   Additional needs identified to be addressed with provider: Yes             Method of communication with provider: phone. ACM: Dolores Kemp RN  Per RPM -This morning BP readings were 60/43 and 84/60-The RPM nurse had attempted to outreach to the patient  Note RPM nurse sent message she was unable to reach patient for Red Alert earlier today  Contacted patient  Patient reports a nurse came to her home yesterday and her BP was reading 200's/140's  Patient reports she has been felling tired  Reviewed medications-  Patient retook blood pressure while on the phone with this ACM and new reading 103/80   Assisted patient with scheduling appointment with PCP  While on the phone with PCP staff patient reports she has been having chest pain  Patient instructed to go to ED now   Patient verbalizing hesitation to go and handed phone to her fiance  Explained to patient's finance, Nanetta Age the reason for 911 now  Mr. Derrick Caban agreed to call 911 after we hang up    Offered patient enrollment in the Remote Patient Monitoring (RPM) program for in-home monitoring: Yes, patient already enrolled. Care Coordination Interventions    Referral from Primary Care Provider: No  Suggested Interventions and Community Resources  [facility] Completed  Transportation Support: Completed  Zone Management Tools:  In Process          Goals Addressed    None         Future Appointments   Date Time Provider Kana Mcmullen   2023 10:00 AM BELA Castaneda CNP [facility]   2023 11:20 AM MD MELVI [facility]

## 2023-06-08 ENCOUNTER — CARE COORDINATION (OUTPATIENT)
Dept: CARE COORDINATION | Age: 48
End: 2023-06-08

## 2023-06-08 ENCOUNTER — CARE COORDINATION (OUTPATIENT)
Dept: CASE MANAGEMENT | Age: 48
End: 2023-06-08

## 2023-06-08 LAB — TROPONIN I: <0.012 NG/ML (ref 0–0.03)

## 2023-06-08 NOTE — CARE COORDINATION
Ambulatory Care Coordination  ED Follow up Call    Reason for ED visit:  Chest pain                        Status:   Today patient c/o Chest pressure, light-headedness and palpitations                 Patient left [facility] yesterday AMA                 Advised patient to return to ED   Patient reports she left AMA because her significant other had went with her to the ED and he did not have a ride home. This ACM explained the risks of not following up with the ED advice  Patient and significant other verbalized understanding and patient agrees to go back to the ED.       FU appts/Provider:    Future Appointments   Date Time Provider Kana Mcmullen   6/9/2023 10:00 AM Eric Salcido, BELA - CNP [facility]   7/31/2023 11:20 AM MD MELVI [facility]

## 2023-06-08 NOTE — TELEPHONE ENCOUNTER
If it was recommended that she stay, but she refused, I am not really sure what else to offer. Sounds as if the patient is not following through with recommended medical care. I suppose if slurred speech and difficulty understanding, may need to go back to the emergency department. She has had some fluctuation in her blood pressure.   It does appear she has an appoint with her nurse practitioner tomorrow

## 2023-06-08 NOTE — CARE COORDINATION
Attempted to contact patient for ED f/u call; left HIPPA compliant message and ACM contact information   Contacted [facility] and requested ED AVS be faxed to PCP office; fax number provided

## 2023-06-08 NOTE — CARE COORDINATION
Remote Alert Monitoring Note    Challenges to be reviewed by the provider   Additional needs identified to be addressed with provider Yes    Reviewed and followed up on alerts and treatments-LPN contacted pt in regards to RPM red alert for survey response indicating that pt has used rescue inhaler more than four times in the past 24 hrs. Pt stated that she went to the ER last night but refused to stay to be treated because she did not have ride to take her home. Pt's speech was slurred during call, difficult to understand pt. Please advise, thank you. Date/Time:  2023 1:59 PM  Patient Current Location: [facility]    LPN contacted patient by telephone regarding red alert received for survey response (pt has used inhaler more than 4 times in 24 hrs). Verified patients name and  as identifiers. Background: RPM for HtN, COPD, Asthma  Refer to 911 immediately if:  Patient unresponsive or unable to provide history  Change in cognition or sudden confusion  Patient unable to respond in complete sentences  Intense chest pain/tightness  Any concern for any clinical emergency  Red Alert: Provider response time of 1 hr required for any red alert requiring intervention  Yellow Alert: Provider response time of 3hr required for any escalated yellow alert        Clinical Interventions: Reviewed and followed up on alerts and treatments-LPN contacted pt in regards to RPM red alert for survey response indicating that pt has used rescue inhaler more than four times in the past 24 hrs. Pt stated that she went to the ER last night but refused to stay to be treated because she did not have ride to take her home. Pt's speech was slurred during call, difficult to understand pt. Writer will route to PCP and ACM to advise. Plan/Follow Up: Will continue to review, monitor and address alerts with follow up based on severity of symptoms and risk factors.       Casie Dunn LPN, [facility]  PH: [phone]     Current

## 2023-06-08 NOTE — TELEPHONE ENCOUNTER
Spoke to pt. She is back in ED said she will stay this time. I told her it is important that she stay and gets the care she needs. If she keeps leaving something could happen.

## 2023-06-09 ENCOUNTER — CARE COORDINATION (OUTPATIENT)
Dept: CARE COORDINATION | Age: 48
End: 2023-06-09

## 2023-06-09 NOTE — CARE COORDINATION
Ambulatory Care Coordination  ED Follow up Call    Reason for ED visit: Low Blood pressure and chest pressure  Status:     improved    Patient reports her blood pressure this am is 105/80  She denies feeling chest pain, pressure, palpitations,sob, diaphoresis or feeling light-headed or dizzy  Patient left AMA from ED on 6/7//23 and 6/8/23  Patient verbalizes understanding to go back to the ED if symptoms resume  She no showed for appointment with PCP this morning; she reports she overslept  Patient rescheduled appointment with PCP for 6/16/23  Patient verbalizes understanding to go back to ED if hypotensive or symptoms return  Patient will monitor BP daily prior to taking medications   She will notify PCP or provider on call If BP running low prior to taking her medications. Plan to f/u with [facility]; their office is already closed for today      ====================  Did you call your PCP prior to going to the ED? Not Applicable    Did you receive a discharge instructions from the Emergency Room? Yes  Review of Instructions:     Understands what to report/when to return?:  Yes   Understands discharge instructions?:  Yes   Following discharge instructions?:  Yes       Are there any new complaints of pain? No  New Pain Meds? No   Are there any other complaints/concerns that you wish to tell your provider? No  New Medications?:   No   Understands Medications? Yes  Taking Medications? Yes  Can you swallow your pills?   Yes  FU appts/Provider:    Future Appointments   Date Time Provider Kana Mcmullen   6/16/2023  8:40 AM MD MELVI [facility]   7/31/2023 11:20 AM MD MELVI [facility]

## 2023-06-09 NOTE — CARE COORDINATION
Patient left voice mail stating she left AMA from [address] [place] [address] on 6/8/23   Attempted to return call to patient for CC f/u call; left HIPPA compliant message and ACM contact information. Contacted [facility] for medical records and they will fax the ED notes to PCP office.

## 2023-06-13 VITALS
DIASTOLIC BLOOD PRESSURE: 85 MMHG | OXYGEN SATURATION: 93 % | SYSTOLIC BLOOD PRESSURE: 126 MMHG | HEART RATE: 99 BPM | TEMPERATURE: 98.2 F

## 2023-06-20 ENCOUNTER — TELEPHONE (OUTPATIENT)
Dept: FAMILY MEDICINE CLINIC | Age: 48
End: 2023-06-20

## 2023-06-20 NOTE — TELEPHONE ENCOUNTER
Pt returned call to office and was informed she had missed her appt on 06/09/23  and 06/16/23. When asked why she missed she did not have a reason, I asked if it was transportation issues? She replied yes so I advised her to set that up for her upcoming appt in July.

## 2023-06-20 NOTE — TELEPHONE ENCOUNTER
Addendum: Called pt to discuss her No Show on 06/16/23, no answer, left a voicemail for her to contact the office.

## 2023-07-10 LAB
ANISOCYTOSIS: ABNORMAL
BASOPHILS # BLD: 1 % (ref 0–1)
BASOPHILS RELATIVE PERCENT: 0.9 % (ref 0–1)
EOSINOPHIL # BLD: 1 % (ref 0–5)
EOSINOPHILS RELATIVE PERCENT: 1.8 % (ref 0–5)
HCT VFR BLD CALC: 49.2 % (ref 35.7–46.7)
HGB: 16.3 G/DL (ref 11.9–15.9)
IMMATURE GRANULOCYTES %: 0.4 % (ref 0–0.5)
LYMPHOCYTE %: 32.5 % (ref 15–45)
LYMPHOCYTES # BLD: 36 % (ref 15–45)
LYMPHOCYTES ABSOLUTE: 6.6 X(10)3/UL (ref 1.1–2.7)
MCH RBC QN AUTO: 28.5 PG (ref 28.1–33.6)
MCHC RBC AUTO-ENTMCNC: 33.1 G/DL (ref 32.8–34.7)
MCV RBC AUTO: 86 FL (ref 82.9–99.9)
MONOCYTES # BLD: 4 % (ref 0–12)
MONOCYTES RELATIVE PERCENT: 4.5 % (ref 0–12)
NEUTROPHILS ABSOLUTE: 12.1 X(10)3/UL (ref 1.8–7.2)
NEUTROPHILS RELATIVE PERCENT: 59.9 % (ref 40–70)
NEUTROPHILS: 58 % (ref 40–70)
PDW BLD-RTO: 14 % (ref 11.6–14.8)
PLATELET # BLD: 401 X1000 (ref 175–420)
RBC # BLD: ABNORMAL 10*6/UL
RBC: 5.72 X1000000 (ref 3.72–5.31)
WBC: 20.2 X1000 (ref 4.5–10.3)

## 2023-07-13 ENCOUNTER — CARE COORDINATION (OUTPATIENT)
Dept: CARE COORDINATION | Age: 48
End: 2023-07-13

## 2023-07-13 NOTE — CARE COORDINATION
Ambulatory Care Coordination Note  7/13/2023        ACM contacted the patient by telephone. Verified name  with patient as identifiers. Provided introduction to self, and explanation of the ACM role. Challenges to be reviewed by the provider   Additional needs identified to be addressed with provider: No  none    FYI:  Patient informed this ACM she ran out of her pill packs three days ago and the pharmacy would not refill her medications. This ACM contacted [employer] and spoke with Christian Purvis. The call was merged with Christian Purvis, the patient and this ACM. Christian Purvis reports they delivered the pill packs to her address on June 26th and there is a record that HAZEL Pathak signed for the pill packs. Patient should have two weeks left of pill packs. Patient looked for the pill packs while on the phone with this ACM and the pharmacy and she found her pill packs; she will resume her medications. Noted patient has no showed for last two appointments with PCP  Patient reports she has no problems with transportation and that her insurance provides transportation to her appointments. Patient did not give reason for missing appointments. Explained to patient rationale for notifying the PCP office in advance if not able to make appointment. Reminded patient she has an appointment on 7/31/23 with PCP; patient is unsure if she can make that appointment. Patient agrees to f/u call next week and will let ACM know if she can make the appointment or will need to reschedule. Discussed RPM-Patient decided to d/c RPM    PLAN:  D/C RPM           Method of communication with provider: chart routing. ACM: Javed Malhotra RN    Christian Purvis with [employer] confirmed the following medications are packed in her pill packs:  Linzess  Lamictal  Lasix  Carvedilol  Xyzal  Ativan  Omeprazole  Invega    Offered patient enrollment in the Remote Patient Monitoring (RPM) program for in-home monitoring: Yes, patient already enrolled.         Care

## 2023-07-19 ENCOUNTER — TELEPHONE (OUTPATIENT)
Dept: PULMONOLOGY | Age: 48
End: 2023-07-19

## 2023-07-19 PROBLEM — J44.1 COPD EXACERBATION (HCC): Status: RESOLVED | Noted: 2021-03-31 | Resolved: 2023-07-19

## 2023-07-19 PROBLEM — F60.2 ANTISOCIAL PERSONALITY DISORDER (HCC): Status: ACTIVE | Noted: 2023-07-19

## 2023-07-19 PROBLEM — J44.9 ASTHMA-COPD OVERLAP SYNDROME: Status: ACTIVE | Noted: 2020-10-01

## 2023-07-19 PROBLEM — G47.33 OBSTRUCTIVE SLEEP APNEA SYNDROME: Status: ACTIVE | Noted: 2023-07-19

## 2023-07-19 PROBLEM — J44.89 ASTHMA-COPD OVERLAP SYNDROME: Status: ACTIVE | Noted: 2020-10-01

## 2023-07-19 RX ORDER — ONDANSETRON 4 MG/1
TABLET, ORALLY DISINTEGRATING ORAL
COMMUNITY
Start: 2023-04-25

## 2023-07-19 RX ORDER — ZIPRASIDONE HYDROCHLORIDE 40 MG/1
CAPSULE ORAL
COMMUNITY
Start: 2023-07-13

## 2023-07-19 RX ORDER — OXYCODONE HYDROCHLORIDE AND ACETAMINOPHEN 5; 325 MG/1; MG/1
1 TABLET ORAL EVERY 8 HOURS PRN
COMMUNITY
Start: 2023-06-14 | End: 2023-07-27 | Stop reason: SDUPTHER

## 2023-07-19 NOTE — TELEPHONE ENCOUNTER
Patient did not show for office visit  with Janna Greenwood on 7/19/23. Reason:  No reason    This is patient's second no show. Patient was ano show on: 4/2023 with Margot Braden. Patient did not want to reschedule when I spoke with her.

## 2023-07-24 ENCOUNTER — CARE COORDINATION (OUTPATIENT)
Dept: CARE COORDINATION | Age: 48
End: 2023-07-24

## 2023-07-24 DIAGNOSIS — I10 ESSENTIAL HYPERTENSION: ICD-10-CM

## 2023-07-24 DIAGNOSIS — J44.9 ASTHMA-COPD OVERLAP SYNDROME (HCC): ICD-10-CM

## 2023-07-24 DIAGNOSIS — J44.9 CHRONIC OBSTRUCTIVE PULMONARY DISEASE, UNSPECIFIED COPD TYPE (HCC): Primary | ICD-10-CM

## 2023-07-24 NOTE — CARE COORDINATION
Ambulatory Care Coordination Note  7/24/2023        Challenges to be reviewed by the provider   Additional needs identified to be addressed with provider: No  none               Method of communication with provider: none. ACM: Mishel Benito RN    Patient returned call  Patient reports all of the medical equipment is packed up and ready to go  Informed patient the RPM Team will be outreaching to her for further instructions; patient verbalized understanding  Noted patient has no showed for appointments with multiple providers  Inquired if patient in need of additional resources/needs  Reminded patient her insurance offers transportation to medical appointments and her PCP offers virtual visits  Patient requested 7/31/23 appointment with PCP be changed to virtual visit  Assisted patient with changing appointment  Reminded patient to contact the PCP office at least 24 hours if she needs to reschedule appointment  Patient verbalizes understanding that multiple no shows can lead to d/c from office. Offered patient enrollment in the Remote Patient Monitoring (RPM) program for in-home monitoring: Yes, patient already enrolled. General Assessment    Do you have any symptoms that are causing concern?: No        Care Coordination Interventions    Referral from Primary Care Provider: No  Suggested Interventions and [address]: Completed  Transportation Support: Completed (Comment: Utilizes transportation via insurance)  Zone Management Tools:  In Process          Goals Addressed    None         Future Appointments   Date Time Provider [address]   7/31/2023 11:20 AM MD MELVI [facility]

## 2023-07-24 NOTE — CARE COORDINATION
Received voice mail from patient that the RPM equipment is ready to be picked up  Note last ACM CC note patient decided to d/c RPM  This ACM attempted to return call; left HIPPA compliant message and ACM contact information  Will send order to d/c RPM to RPM Team

## 2023-07-24 NOTE — CARE COORDINATION
CCSS placed call to patient to arrange RPM kit  through [facility]. Reviewed with patient how to pack equipment in original packing. Verified patient's availability to schedule UPS  time. UPS  time requested. Anticipated  date range 2-5 business days.

## 2023-07-25 NOTE — PROGRESS NOTES
Remote Patient Order Discontinued    Received request from [facility], RN  to discontinue order for remote patient monitoring of COPD, HTN, and Asthma and order completed.

## 2023-07-27 ENCOUNTER — TELEMEDICINE (OUTPATIENT)
Dept: FAMILY MEDICINE CLINIC | Age: 48
End: 2023-07-27
Payer: MEDICARE

## 2023-07-27 DIAGNOSIS — M19.049 OSTEOARTHRITIS OF HAND, UNSPECIFIED LATERALITY, UNSPECIFIED OSTEOARTHRITIS TYPE: Primary | ICD-10-CM

## 2023-07-27 PROCEDURE — 98968 PH1 ASSMT&MGMT NQHP 21-30: CPT

## 2023-07-27 RX ORDER — OXYCODONE HYDROCHLORIDE AND ACETAMINOPHEN 5; 325 MG/1; MG/1
1 TABLET ORAL EVERY 8 HOURS PRN
Qty: 42 TABLET | Refills: 0 | Status: SHIPPED | OUTPATIENT
Start: 2023-07-27 | End: 2023-08-10

## 2023-07-27 NOTE — PROGRESS NOTES
Maximino Davis is a 57 y.o. female evaluated via telephone on 7/27/2023. Consent:  She and/or health care decision maker is aware that that she may receive a bill for this telephone service, depending on her insurance coverage, and has provided verbal consent to proceed: Yes      Documentation:  I communicated with the patient and/or health care decision maker about current medical questions and concerns were minutes. Details of this discussion including any medical advice provided:     Patient evaluated today through telephone call visit for chronic pain condition. Patient recently dismissed from pain management due to missed appointment. Patient is on multiple controlled substances likely for pain patient therefore needs to reestablish with any pain management specialist.  Patient has been out of her oxycodone for over 1 week. Denies any withdrawal symptoms at this time. Patient requesting new supply of oxycodone at this time. Current Outpatient Medications   Medication Sig Dispense Refill    oxyCODONE-acetaminophen (PERCOCET) 5-325 MG per tablet Take 1 tablet by mouth every 8 hours as needed for Pain for up to 14 days.  Max Daily Amount: 3 tablets 42 tablet 0    ziprasidone (GEODON) 40 MG capsule       ondansetron (ZOFRAN-ODT) 4 MG disintegrating tablet DISSOLVE 1 TABLET IN MOUTH 4 TIMES DAILY AS NEEDED FOR NAUSEA      PROAIR RESPICLICK 144 (90 Base) MCG/ACT aerosol powder inhalation INHALE 2 PUFFS INTO THE LUNGS EVERY 6 HOURS AS NEEDED FOR SHORTNESS OF BREATH 1 each 3    tiotropium (SPIRIVA RESPIMAT) 2.5 MCG/ACT AERS inhaler INHALE 2 PUFFS DAILY 4 g 3    budesonide-formoterol (SYMBICORT) 160-4.5 MCG/ACT AERO Inhale 2 puffs into the lungs 2 times daily Rinse mouth with water after use 10.2 g 5    albuterol sulfate HFA (PROVENTIL;VENTOLIN;PROAIR) 108 (90 Base) MCG/ACT inhaler Inhale 2 puffs into the lungs every 6 hours as needed for Shortness of Breath 18 g 3    HYDROcodone-acetaminophen (NORCO)

## 2023-08-03 DIAGNOSIS — I10 ESSENTIAL HYPERTENSION: ICD-10-CM

## 2023-08-03 DIAGNOSIS — K21.9 CHRONIC GERD: ICD-10-CM

## 2023-08-03 DIAGNOSIS — J30.2 SEASONAL ALLERGIES: ICD-10-CM

## 2023-08-03 RX ORDER — OMEPRAZOLE 40 MG/1
40 CAPSULE, DELAYED RELEASE ORAL 2 TIMES DAILY
Qty: 56 CAPSULE | Refills: 3 | Status: SHIPPED | OUTPATIENT
Start: 2023-08-03

## 2023-08-03 RX ORDER — ASPIRIN 81 MG/1
TABLET, COATED ORAL
Qty: 28 TABLET | Refills: 5 | Status: SHIPPED | OUTPATIENT
Start: 2023-08-03

## 2023-08-03 RX ORDER — LINACLOTIDE 290 UG/1
CAPSULE, GELATIN COATED ORAL
Qty: 30 CAPSULE | Refills: 3 | Status: SHIPPED | OUTPATIENT
Start: 2023-08-03

## 2023-08-03 RX ORDER — FLUTICASONE PROPIONATE 50 MCG
SPRAY, SUSPENSION (ML) NASAL
Qty: 16 G | Refills: 5 | Status: SHIPPED | OUTPATIENT
Start: 2023-08-03

## 2023-08-15 ENCOUNTER — CARE COORDINATION (OUTPATIENT)
Dept: CARE COORDINATION | Age: 48
End: 2023-08-15

## 2023-08-15 ENCOUNTER — TELEPHONE (OUTPATIENT)
Dept: FAMILY MEDICINE CLINIC | Age: 48
End: 2023-08-15

## 2023-08-15 DIAGNOSIS — M19.049 OSTEOARTHRITIS OF HAND, UNSPECIFIED LATERALITY, UNSPECIFIED OSTEOARTHRITIS TYPE: ICD-10-CM

## 2023-08-15 RX ORDER — OXYCODONE HYDROCHLORIDE AND ACETAMINOPHEN 5; 325 MG/1; MG/1
1 TABLET ORAL EVERY 8 HOURS PRN
Qty: 42 TABLET | Refills: 0 | OUTPATIENT
Start: 2023-08-15 | End: 2023-08-29

## 2023-08-15 NOTE — CARE COORDINATION
Ambulatory Care Coordination Note  8/15/2023    Patient Current Location:  Home: [facility]     Patient contacted this ACM. Verified name and  with patient as identifiers. Provided introduction to self, and explanation of the ACM role. Challenges to be reviewed by the provider   Additional needs identified to be addressed with provider: Yes  medications-Patient requesting a refill for  Percocet 5-325 mg Q 8 hours to be sent to [address] [facility]    Patient reports she was not able to get an appointment to establish with the pain clinic until 2023    General Assessment    Do you have any symptoms that are causing concern?: Yes  Reported Symptoms: Pain (Comment: chronic back and left hand pain)                 Method of communication with provider: chart routing. ACM: Chuck Singh RN        Offered patient enrollment in the Remote Patient Monitoring (RPM) program for in-home monitoring:  graduated . COPD Assessment    Does the patient understand envrionmental exposure?: Yes  Is the patient able to verbalize Rescue vs. Long Acting medications?: No  Does the patient have a nebulizer?: Yes  Does the patient use a space with inhaled medications?: Yes            Symptoms:     Symptom course: stable  Increase use of rapid acting/rescue inhaled medications?: No  Change in chronic cough?: No/At Baseline  Change in sputum?: No/At Baseline  Self Monitoring - SaO2: No  Have you had a recent diagnosis of pneumonia either by PCP or at a hospital?: No          Care Coordination Interventions    Referral from Primary Care Provider: No  Suggested Interventions and [address] [place]: Completed  Transportation Support: Completed (Comment: Utilizes transportation via insurance)  Zone Management Tools:  In Process          Goals Addressed                   This Visit's Progress       Care Coordination     COMPLETED: Conditions and Symptoms   Improving     I will follow my

## 2023-08-15 NOTE — TELEPHONE ENCOUNTER
She has been referred to pain mgmt.   We will not be refilling controlled pain medications, michelle given hx of multiple other medications

## 2023-08-15 NOTE — TELEPHONE ENCOUNTER
Patient called and asked for a prescription for Vicodin to be called to the pharmacy. I informed her that the Percocet was denied so we probably won't be filling Vicodin either.

## 2023-08-15 NOTE — CARE COORDINATION
Patient contacted this ACM for update on refill request  Noted PCP's response and notified patient refill has been declined.   Reviewed PCP's response; patient verbalized understanding and ended call

## 2023-08-17 ENCOUNTER — TELEPHONE (OUTPATIENT)
Dept: FAMILY MEDICINE CLINIC | Age: 48
End: 2023-08-17

## 2023-08-17 DIAGNOSIS — R07.89 CHEST WALL PAIN: ICD-10-CM

## 2023-08-17 RX ORDER — METHYLPREDNISOLONE 4 MG/1
TABLET ORAL
Qty: 1 KIT | Refills: 0 | Status: SHIPPED | OUTPATIENT
Start: 2023-08-17 | End: 2023-08-23

## 2023-08-17 RX ORDER — IBUPROFEN 600 MG/1
600 TABLET ORAL 3 TIMES DAILY PRN
Qty: 45 TABLET | Refills: 0 | Status: SHIPPED | OUTPATIENT
Start: 2023-08-17

## 2023-08-17 NOTE — TELEPHONE ENCOUNTER
Pt called back again today wanting to speak with Dr Venecia Dalal. When asked why  she stated she was having pain in her legs and wants refills on her gabapentin and pain medication. Advised pt to go to the ER if pain was that bad. Pt again wanted to speak to Dr Venecia Dalal and I explained he was with patients and did not have time to speak at the moment. Pt said fine and ended call.

## 2023-08-17 NOTE — TELEPHONE ENCOUNTER
Received another call from patient. She states she is having excruciating pain and I am not can, as well denies pain in her legs. She had previously been referred to pain management, but they cannot send her back to me until October. She states she was told to come here to see what I can do regarding her pain in the meantime. She had been on ibuprofen in the past.  She is asking for something for pain and for gabapentin. As documented in previous notes, I would not recommend stronger pain medications or other things that may be sedative in nature, especially given her psychiatric medications as well. Tylenol would be recommended. We could consider resuming ibuprofen or Aleve. May be a different anti-inflammatory may be an option as well. I attempted to call the patient back, but got her voicemail.   I recommended that she follow-up in the office next week as scheduled

## 2023-08-17 NOTE — TELEPHONE ENCOUNTER
Rec'd yet another call from pt. C/o hand pain, back pain down the legs. Had prev been seen by pain mgmt and had been on percocet and gabapentin. Apparently d/c from pain mgmt due to missing appt. Had been on percocet and gabapentin. Reviewed oarrs report. Still getting ativan tid as well. Pt stated only using prn, but, per oarrs, has been filling #90 ativan consistently every mo. Prior xray of back did not show sig abn. Mild disc space narrowing. Pt has appt w/ pain mgmt w/in next couple of mo. Informed pt that I would not be prescribing narcotic pain medication w/ the other medications she is currently taking and her history. Did discuss that we could rx ibuprofen and possibly a steroid pack for pain in her back down her leg. O/w can f/u in the office or w/ pain mgmt.

## 2023-08-17 NOTE — TELEPHONE ENCOUNTER
Given her multiple medications and medical issues, it would not be advisable for pain medications or other medications that would cause additional sedation. I would recommend tylenol for pain.

## 2023-08-22 ENCOUNTER — TELEMEDICINE (OUTPATIENT)
Dept: FAMILY MEDICINE CLINIC | Age: 48
End: 2023-08-22
Payer: MEDICARE

## 2023-08-22 DIAGNOSIS — Z79.899 ENCOUNTER FOR MEDICATION REVIEW: Primary | ICD-10-CM

## 2023-08-22 PROCEDURE — 99213 OFFICE O/P EST LOW 20 MIN: CPT

## 2023-08-22 ASSESSMENT — ENCOUNTER SYMPTOMS
GASTROINTESTINAL NEGATIVE: 1
RESPIRATORY NEGATIVE: 1

## 2023-08-22 NOTE — PROGRESS NOTES
2023    TELEHEALTH EVALUATION -- Audio/Visual (During TOIAP-91 public health emergency)    HPI:    Aury Cazares (:  1975) has requested an audio/video evaluation for the following concern(s):    Patient seen today through video visit for ongoing issue with her requesting refills of gabapentin and oxycodone. There are multiple messages in the computer from the patient requesting refills on both prescriptions. There are also multiple messages in her chart from Dr. Benedicto Packer stating that he would not refill the medication at this time particularly given the fact she is also on Ativan and her medical history. Patient has been dismissed from pain management due to missed appointments. Patient was told on repeated messages to follow-up with pain management. I had a discussion with the patient today and she states she is not being seen until October by her pain management specialist.  Patient states that she was told by Dr. Benedicto Packer if she discontinues her lorazepam that he would refill oxycodone or gabapentin for her. I do not see that written in any of the notes by Dr. Benedicto Packer. He mentioned placing the patient on ibuprofen and Medrol Dosepak to help improve pain symptoms which she did do so placing orders. The patient has an active prescription for lorazepam that was filled on 2023 for a quantity of 90 tablets and 30-day supply. Patient reported that she only takes lorazepam on a as needed basis but is apparent based on refills and her OARRS report that she is taking on a routine basis daily. Patient has not yet picked up her ibuprofen and Medrol Dosepak prescriptions ordered by Dr. Benedicto Packer. Review of Systems   Constitutional: Negative. HENT: Negative. Respiratory: Negative. Cardiovascular: Negative. Gastrointestinal: Negative. Musculoskeletal:  Positive for arthralgias. Psychiatric/Behavioral:          Flat affect with slow speech.      Prior to Visit Medications Discharge instructions given. Patient verbalized understanding. Return to [address] [place] [address],[address] in 2 week(s).   Called/faxed orders to  [facility]

## 2023-09-06 DIAGNOSIS — J44.9 CHRONIC OBSTRUCTIVE PULMONARY DISEASE, UNSPECIFIED COPD TYPE (HCC): ICD-10-CM

## 2023-09-06 RX ORDER — BUDESONIDE AND FORMOTEROL FUMARATE DIHYDRATE 160; 4.5 UG/1; UG/1
AEROSOL RESPIRATORY (INHALATION)
Qty: 10.2 G | Refills: 5 | Status: SHIPPED | OUTPATIENT
Start: 2023-09-06

## 2023-09-12 DIAGNOSIS — J30.2 SEASONAL ALLERGIES: ICD-10-CM

## 2023-09-12 RX ORDER — LEVOCETIRIZINE DIHYDROCHLORIDE 5 MG/1
5 TABLET, FILM COATED ORAL NIGHTLY
Qty: 28 TABLET | Refills: 0 | Status: SHIPPED | OUTPATIENT
Start: 2023-09-12

## 2023-09-18 ENCOUNTER — PATIENT MESSAGE (OUTPATIENT)
Dept: PULMONOLOGY | Age: 48
End: 2023-09-18

## 2023-09-18 ENCOUNTER — TELEPHONE (OUTPATIENT)
Dept: FAMILY MEDICINE CLINIC | Age: 48
End: 2023-09-18

## 2023-09-18 NOTE — TELEPHONE ENCOUNTER
On Call Communication:. Call from [facility] c/o \"screeming\" back pain and asking for gabapentin to be ordered. Advised this can not be ordered by on call coverage. Discussed that pharmacies are not open at this time. She should contact her provider during office hours.

## 2023-09-19 ENCOUNTER — CARE COORDINATION (OUTPATIENT)
Dept: CARE COORDINATION | Age: 48
End: 2023-09-19

## 2023-09-19 ENCOUNTER — TELEMEDICINE (OUTPATIENT)
Dept: FAMILY MEDICINE CLINIC | Age: 48
End: 2023-09-19
Payer: MEDICARE

## 2023-09-19 DIAGNOSIS — F43.10 PTSD (POST-TRAUMATIC STRESS DISORDER): Chronic | ICD-10-CM

## 2023-09-19 DIAGNOSIS — G56.40 COMPLEX REGIONAL PAIN SYNDROME TYPE 2, AFFECTING UNSPECIFIED SITE: Primary | ICD-10-CM

## 2023-09-19 PROCEDURE — 99213 OFFICE O/P EST LOW 20 MIN: CPT | Performed by: FAMILY MEDICINE

## 2023-09-19 RX ORDER — GABAPENTIN 100 MG/1
100 CAPSULE ORAL 3 TIMES DAILY
Qty: 42 CAPSULE | Refills: 0 | Status: SHIPPED | OUTPATIENT
Start: 2023-09-19 | End: 2023-10-03

## 2023-09-19 ASSESSMENT — ENCOUNTER SYMPTOMS: BACK PAIN: 1

## 2023-09-19 NOTE — PROGRESS NOTES
that risk.  - gabapentin (NEURONTIN) 100 MG capsule; Take 1 capsule by mouth 3 times daily for 14 days. Intended supply: 30 days  Dispense: 42 capsule; Refill: 0    2. PTSD (post-traumatic stress disorder)  Follow-up with psychiatry as scheduled. Multiple issues as listed. Maria Esther Champagne, was evaluated through a synchronous (real-time) audio-video encounter. The patient (or guardian if applicable) is aware that this is a billable service, which includes applicable co-pays. This Virtual Visit was conducted with patient's (and/or legal guardian's) consent. Patient identification was verified, and a caregiver was present when appropriate. The patient was located at Home: [facility]  Provider was located at [facility] ([address] [place] [address]): [address],  [address] [place] [address]       Total time spent for this encounter: Not billed by time    --Alice Bull MD on 9/19/2023 at 10:05 AM    An Stalin Holland was used to authenticate this note. No follow-ups on file. An  electronic signature was used to authenticate this note. --Alice Bull MD on 9/19/2023 at 10:03 AM      This document was prepared by a combination of typing and transcription through a voice recognition software. Pursuant to the emergency declaration under the [place] [address] waiver authority and the [employer] Act, this Virtual  Visit was conducted, with patient's consent, to reduce the patient's risk of exposure to COVID-19 and provide continuity of care for an established patient. Services were provided through a video synchronous discussion virtually to substitute for in-person clinic visit.

## 2023-09-29 DIAGNOSIS — J44.9 CHRONIC OBSTRUCTIVE PULMONARY DISEASE, UNSPECIFIED COPD TYPE (HCC): ICD-10-CM

## 2023-09-29 DIAGNOSIS — J30.2 SEASONAL ALLERGIES: ICD-10-CM

## 2023-09-29 DIAGNOSIS — J44.1 COPD EXACERBATION (HCC): ICD-10-CM

## 2023-09-29 RX ORDER — LEVOCETIRIZINE DIHYDROCHLORIDE 5 MG/1
5 TABLET, FILM COATED ORAL NIGHTLY
Qty: 30 TABLET | Refills: 5 | Status: SHIPPED | OUTPATIENT
Start: 2023-09-29

## 2023-09-29 RX ORDER — ALBUTEROL SULFATE 90 UG/1
POWDER, METERED RESPIRATORY (INHALATION)
Qty: 1 EACH | Refills: 3 | Status: SHIPPED | OUTPATIENT
Start: 2023-09-29

## 2023-10-04 ENCOUNTER — CARE COORDINATION (OUTPATIENT)
Dept: CARE COORDINATION | Age: 48
End: 2023-10-04

## 2023-10-04 NOTE — CARE COORDINATION
Attempted to contact patient for CC f/u; unable to leave message d/t calling restrictions  No further outreach scheduled with this ACM; episode of care resolved

## 2023-10-06 DIAGNOSIS — J44.9 CHRONIC OBSTRUCTIVE PULMONARY DISEASE, UNSPECIFIED COPD TYPE (HCC): ICD-10-CM

## 2023-10-06 RX ORDER — ALBUTEROL SULFATE 2.5 MG/3ML
2.5 SOLUTION RESPIRATORY (INHALATION) EVERY 4 HOURS PRN
Qty: 180 ML | Refills: 5 | Status: SHIPPED | OUTPATIENT
Start: 2023-10-06

## 2023-10-08 ENCOUNTER — TELEPHONE (OUTPATIENT)
Dept: FAMILY MEDICINE CLINIC | Age: 48
End: 2023-10-08

## 2023-10-08 NOTE — TELEPHONE ENCOUNTER
Patient paged overnight regarding hemorrhoids. She felt one of her hemorrhoids broke open. She thinks she might of had some rectal bleeding. I discussed that if it was severe to go to the ER for further evaluation. An understanding of the plan was verbalized.

## 2023-10-09 ENCOUNTER — COMMUNITY OUTREACH (OUTPATIENT)
Dept: FAMILY MEDICINE CLINIC | Age: 48
End: 2023-10-09

## 2023-10-11 ENCOUNTER — PATIENT MESSAGE (OUTPATIENT)
Dept: PULMONOLOGY | Age: 48
End: 2023-10-11

## 2023-10-12 ENCOUNTER — TELEPHONE (OUTPATIENT)
Dept: FAMILY MEDICINE CLINIC | Age: 48
End: 2023-10-12

## 2023-10-12 RX ORDER — HYDROCORTISONE 25 MG/G
CREAM TOPICAL
Qty: 28 G | Refills: 0 | Status: SHIPPED | OUTPATIENT
Start: 2023-10-12

## 2023-10-12 NOTE — TELEPHONE ENCOUNTER
Pt calling she has bleeding hemorrhoids and asking for something to be called in to [facility].  She has not tried any OTC treatment

## 2023-10-20 ENCOUNTER — TELEPHONE (OUTPATIENT)
Dept: PULMONOLOGY | Age: 48
End: 2023-10-20

## 2023-10-20 ENCOUNTER — TELEPHONE (OUTPATIENT)
Dept: FAMILY MEDICINE CLINIC | Age: 48
End: 2023-10-20

## 2023-10-20 DIAGNOSIS — G56.40 COMPLEX REGIONAL PAIN SYNDROME TYPE 2, AFFECTING UNSPECIFIED SITE: Primary | ICD-10-CM

## 2023-10-20 RX ORDER — GABAPENTIN 100 MG/1
100 CAPSULE ORAL 3 TIMES DAILY
Qty: 75 CAPSULE | Refills: 0 | Status: SHIPPED | OUTPATIENT
Start: 2023-10-20 | End: 2023-11-19

## 2023-10-20 NOTE — TELEPHONE ENCOUNTER
Patient did not show for Hst f/u  with Diana Anand on 10/20/23. This is patient's second no show. Patient was ano show on: 4.1.23. Patient did not reschedule.   LVM to Reschedule

## 2023-10-20 NOTE — TELEPHONE ENCOUNTER
Rec'd call from pt re ongoing issues w/ back and hand pain. C/o cramping in hands. States could not see pain mgmt recently due to pain specialist not accepting her. She is asking for gabapentin. Oarrs reviewed. Low dose gabapentin sent to pharmacy.

## 2023-10-21 ENCOUNTER — TELEPHONE (OUTPATIENT)
Dept: FAMILY MEDICINE CLINIC | Age: 48
End: 2023-10-21

## 2023-10-21 NOTE — TELEPHONE ENCOUNTER
On Call Provider Note:  Patient called due to acute worsening of chronic right sided low back pain which started last night after being more active over the past couple of days. States \"I feel like I've been run over by a dump truck\". Pain located mid-lumbar and shoots down the back of her right leg. No saddle paraesthesias, bowel/ bladder incontinence. No fever, CP, SOB, cough, leg swelling, N/V, diarrhea, dysuria, hematuria. Did not go to the ER due to transportation. Chart reviewed- significant for low back pain with prior evaluation by Pain Mgmt. S/P several CALVIN. Noted pt also on Ativan 2 mg TID by Psychiatry. Recently restarted Gabapentin 100 TID. Instructed patient to take 1 extra Gabapentin for now and to see how she feels. If pain persists recommend ER evaluation. Pt verbalized understanding.

## 2023-10-23 ENCOUNTER — TELEPHONE (OUTPATIENT)
Dept: FAMILY MEDICINE CLINIC | Age: 48
End: 2023-10-23

## 2023-10-24 NOTE — TELEPHONE ENCOUNTER
On call Provider Note:     Patient calling due to concern of possible pneumonia/ URI x 2 days. Symptoms include Right ear pain, head and chest congestion, sore throat, Coughing/ gagging with greenish yellow phlegm with wheezing. Feels feverish but no fever. Having achiness and chills. Currently has Albuterol nebulizer which she has used 4x over the past 2 days. Currently taking Ibuprofen only. Notes Varsha was ill earlier this week but is improving. Hx significant  for COPD. Discussed with patient no pharmacy or Urgent Care is open after 8 PM.  Patient does not feel she can make it through the night without getting worsening symptoms. Recommended ER evaluation due to symptoms, however does not have a ride to or from the hospital.  [address] does not go to her home. Patient requesting note be sent to PCP to make appt within the next couple of days.

## 2023-11-01 ENCOUNTER — TELEPHONE (OUTPATIENT)
Dept: FAMILY MEDICINE CLINIC | Age: 48
End: 2023-11-01

## 2023-11-01 ENCOUNTER — TELEMEDICINE (OUTPATIENT)
Dept: FAMILY MEDICINE CLINIC | Age: 48
End: 2023-11-01
Payer: MEDICARE

## 2023-11-01 DIAGNOSIS — R39.9 UTI SYMPTOMS: Primary | ICD-10-CM

## 2023-11-01 DIAGNOSIS — J06.9 ACUTE URI: ICD-10-CM

## 2023-11-01 PROCEDURE — 98968 PH1 ASSMT&MGMT NQHP 21-30: CPT

## 2023-11-01 RX ORDER — CEFDINIR 300 MG/1
300 CAPSULE ORAL 2 TIMES DAILY
Qty: 20 CAPSULE | Refills: 0 | Status: SHIPPED | OUTPATIENT
Start: 2023-11-01 | End: 2023-11-11

## 2023-11-01 ASSESSMENT — ENCOUNTER SYMPTOMS
SHORTNESS OF BREATH: 0
WHEEZING: 0
COUGH: 1

## 2023-11-01 NOTE — PROGRESS NOTES
Patient reports she cannot get a ride to the office today. We will go ahead and treat with antibiotics for possible UTI given her symptoms. Patient advised to follow-up in office for evaluation if symptoms fail to improve or worsen over the next 3-4 full days of treatment. Consider [facility] imaging if develops any worsening flank pain to rule out kidney stone. Patient will notify the office. Increase water intake to flush kidneys and bladder. May take Azo for urinary burning.  - cefdinir (OMNICEF) 300 MG capsule; Take 1 capsule by mouth 2 times daily for 10 days  Dispense: 20 capsule; Refill: 0    2. Acute URI  See above HPI. Cefdinir chosen for treatment today. I explained the patient cefdinir is a broad spectrum antibiotic and we are using to try and cover from a URI and UTI standpoint. Take meds as ordered and follow-up with office if symptoms fail to improve or worsen. - cefdinir (OMNICEF) 300 MG capsule; Take 1 capsule by mouth 2 times daily for 10 days  Dispense: 20 capsule; Refill: 0      No follow-ups on file. An  electronic signature was used to authenticate this note. --BELA Huntley CNP on 11/1/2023 at 3:38 PM      Sunni Phillips, was evaluated through a synchronous (real-time) audio-video encounter. The patient (or guardian if applicable) is aware that this is a billable service, which includes applicable co-pays. This Virtual Visit was conducted with patient's (and/or legal guardian's) consent. Patient identification was verified, and a caregiver was present when appropriate. The patient was located at Home: [facility]  Provider was located at [facility] ([address] ): [address] [facility],  [address] [facility]         Total time spent for this encounter: 21 minutes    --BELA Huntley CNP on 11/1/2023 at 3:38 PM    An electronic signature was used to authenticate this note.           Pursuant to the emergency declaration under the [place] and the

## 2023-11-01 NOTE — TELEPHONE ENCOUNTER
Received on call page regarding concern for kidney stones. Pt states she has had them before. C/o lower back pain, cough, dysuria. Denies abdominal pain, n/v/d, diaphoresis, fever, hematuria. Recommend evaluation in ER tonight for prompt assessment and evaluation, discussed red flag symptoms/risks. Pt verbalized understanding. Pt would prefer to be seen in office tomorrow or by virtual team if needed. Will cc PCP. Recommended pt call office first thing when office opens to get appointment.

## 2023-11-13 DIAGNOSIS — G56.40 COMPLEX REGIONAL PAIN SYNDROME TYPE 2, AFFECTING UNSPECIFIED SITE: ICD-10-CM

## 2023-11-13 RX ORDER — GABAPENTIN 100 MG/1
CAPSULE ORAL
Qty: 75 CAPSULE | Refills: 0 | OUTPATIENT
Start: 2023-11-13 | End: 2024-01-27

## 2023-11-20 DIAGNOSIS — G56.40 COMPLEX REGIONAL PAIN SYNDROME TYPE 2, AFFECTING UNSPECIFIED SITE: ICD-10-CM

## 2023-11-20 RX ORDER — GABAPENTIN 100 MG/1
100 CAPSULE ORAL 3 TIMES DAILY
Qty: 75 CAPSULE | Refills: 0 | Status: CANCELLED | OUTPATIENT
Start: 2023-11-20 | End: 2023-12-20

## 2023-11-20 NOTE — TELEPHONE ENCOUNTER
Patient is asking for a refill on her pain medication and gabapentin. Patient states that the pain management she was referred to does not take her insurance.

## 2023-11-22 DIAGNOSIS — G56.40 COMPLEX REGIONAL PAIN SYNDROME TYPE 2, AFFECTING UNSPECIFIED SITE: ICD-10-CM

## 2023-11-22 RX ORDER — GABAPENTIN 100 MG/1
100 CAPSULE ORAL 3 TIMES DAILY
Qty: 75 CAPSULE | Refills: 0 | Status: SHIPPED | OUTPATIENT
Start: 2023-11-22 | End: 2023-12-22

## 2023-11-22 NOTE — TELEPHONE ENCOUNTER
I refused medication since pt has not returned our call regarding her pharmacy.  Will resend when she calls back Graft Donor Site Bandage (Optional-Leave Blank If You Don't Want In Note): Steri-strips and a pressure bandage were applied to the donor site.

## 2023-12-05 RX ORDER — LINACLOTIDE 290 UG/1
CAPSULE, GELATIN COATED ORAL
Qty: 30 CAPSULE | Refills: 3 | Status: SHIPPED | OUTPATIENT
Start: 2023-12-05

## 2023-12-15 DIAGNOSIS — K21.9 CHRONIC GERD: ICD-10-CM

## 2023-12-15 RX ORDER — OMEPRAZOLE 40 MG/1
CAPSULE, DELAYED RELEASE ORAL
Qty: 90 CAPSULE | Refills: 3 | Status: SHIPPED | OUTPATIENT
Start: 2023-12-15

## 2023-12-18 DIAGNOSIS — G56.40 COMPLEX REGIONAL PAIN SYNDROME TYPE 2, AFFECTING UNSPECIFIED SITE: Primary | ICD-10-CM

## 2024-01-05 DIAGNOSIS — E78.2 MIXED HYPERLIPIDEMIA: ICD-10-CM

## 2024-01-05 DIAGNOSIS — I10 ESSENTIAL HYPERTENSION: ICD-10-CM

## 2024-01-05 DIAGNOSIS — J44.9 CHRONIC OBSTRUCTIVE PULMONARY DISEASE, UNSPECIFIED COPD TYPE (HCC): ICD-10-CM

## 2024-01-05 RX ORDER — ATORVASTATIN CALCIUM 10 MG/1
10 TABLET, FILM COATED ORAL DAILY
Qty: 28 TABLET | Refills: 5 | Status: SHIPPED | OUTPATIENT
Start: 2024-01-05

## 2024-01-05 RX ORDER — CARVEDILOL 6.25 MG/1
6.25 TABLET ORAL 2 TIMES DAILY
Qty: 56 TABLET | Refills: 5 | Status: SHIPPED | OUTPATIENT
Start: 2024-01-05

## 2024-01-13 ENCOUNTER — TELEPHONE (OUTPATIENT)
Dept: FAMILY MEDICINE CLINIC | Age: 49
End: 2024-01-13

## 2024-01-13 NOTE — TELEPHONE ENCOUNTER
After hours page received from patient. Patient reports severe back pain for over two days. Reports it has been keeping her awake at night and is requesting gabapentin or other pain medication. Reports she has been having difficulty getting in to pain management and would like something for pain until able to be seen by them.  Patient reports pain is \"very severe\" to \"mid back\"  Explained to patient that she would need an evaluation before medication could be prescribed. Recommended patient go to urgent care to be seen. Patient verbalized understanding

## 2024-01-29 DIAGNOSIS — I10 ESSENTIAL HYPERTENSION: ICD-10-CM

## 2024-01-29 DIAGNOSIS — J44.1 COPD EXACERBATION (HCC): ICD-10-CM

## 2024-01-29 DIAGNOSIS — J44.9 CHRONIC OBSTRUCTIVE PULMONARY DISEASE, UNSPECIFIED COPD TYPE (HCC): ICD-10-CM

## 2024-01-29 RX ORDER — ALBUTEROL SULFATE 90 UG/1
POWDER, METERED RESPIRATORY (INHALATION)
Qty: 1 EACH | Refills: 3 | Status: SHIPPED | OUTPATIENT
Start: 2024-01-29

## 2024-01-29 RX ORDER — ASPIRIN 81 MG/1
TABLET, COATED ORAL
Qty: 28 TABLET | Refills: 5 | Status: SHIPPED | OUTPATIENT
Start: 2024-01-29

## 2024-02-27 DIAGNOSIS — J44.9 CHRONIC OBSTRUCTIVE PULMONARY DISEASE, UNSPECIFIED COPD TYPE (HCC): ICD-10-CM

## 2024-02-27 RX ORDER — BUDESONIDE AND FORMOTEROL FUMARATE DIHYDRATE 160; 4.5 UG/1; UG/1
AEROSOL RESPIRATORY (INHALATION)
Qty: 10.2 G | Refills: 11 | Status: SHIPPED | OUTPATIENT
Start: 2024-02-27

## 2024-03-01 DIAGNOSIS — J44.9 CHRONIC OBSTRUCTIVE PULMONARY DISEASE, UNSPECIFIED COPD TYPE (HCC): Primary | ICD-10-CM

## 2024-03-01 RX ORDER — UMECLIDINIUM 62.5 UG/1
1 AEROSOL, POWDER ORAL DAILY
Qty: 1 EACH | Refills: 1 | Status: SHIPPED | OUTPATIENT
Start: 2024-03-01

## 2024-03-01 RX ORDER — ALBUTEROL SULFATE 90 UG/1
2 AEROSOL, METERED RESPIRATORY (INHALATION) EVERY 6 HOURS PRN
Qty: 18 G | Refills: 0 | Status: SHIPPED | OUTPATIENT
Start: 2024-03-01

## 2024-03-01 NOTE — PROGRESS NOTES
[facility] [place] called and needed us to give approval to switch some of her inhalers due to the original ones not being on her insurances formulary list. Spiriva was switched to Incruse Elpt Inh 62.5 MCG and Proair Respi Aer was switched to Ventolin HFA AER [place] approved

## 2024-03-11 DIAGNOSIS — J44.9 CHRONIC OBSTRUCTIVE PULMONARY DISEASE, UNSPECIFIED COPD TYPE (HCC): ICD-10-CM

## 2024-03-11 DIAGNOSIS — E78.2 MIXED HYPERLIPIDEMIA: ICD-10-CM

## 2024-03-11 DIAGNOSIS — I10 ESSENTIAL HYPERTENSION: ICD-10-CM

## 2024-03-11 DIAGNOSIS — K21.9 CHRONIC GERD: ICD-10-CM

## 2024-03-11 DIAGNOSIS — J30.2 SEASONAL ALLERGIES: ICD-10-CM

## 2024-03-11 RX ORDER — LAMOTRIGINE 25 MG/1
50 TABLET ORAL DAILY
Qty: 180 TABLET | Refills: 3 | Status: SHIPPED | OUTPATIENT
Start: 2024-03-11

## 2024-03-11 RX ORDER — CARVEDILOL 6.25 MG/1
6.25 TABLET ORAL 2 TIMES DAILY
Qty: 56 TABLET | Refills: 5 | Status: SHIPPED | OUTPATIENT
Start: 2024-03-11

## 2024-03-11 RX ORDER — BUDESONIDE AND FORMOTEROL FUMARATE DIHYDRATE 160; 4.5 UG/1; UG/1
AEROSOL RESPIRATORY (INHALATION)
Qty: 10.2 G | Refills: 11 | Status: SHIPPED | OUTPATIENT
Start: 2024-03-11

## 2024-03-11 RX ORDER — FLUTICASONE PROPIONATE 50 MCG
SPRAY, SUSPENSION (ML) NASAL
Qty: 16 G | Refills: 5 | Status: SHIPPED | OUTPATIENT
Start: 2024-03-11

## 2024-03-11 RX ORDER — OMEPRAZOLE 40 MG/1
CAPSULE, DELAYED RELEASE ORAL
Qty: 90 CAPSULE | Refills: 3 | Status: SHIPPED | OUTPATIENT
Start: 2024-03-11

## 2024-03-11 RX ORDER — ALBUTEROL SULFATE 2.5 MG/3ML
2.5 SOLUTION RESPIRATORY (INHALATION) EVERY 4 HOURS PRN
Qty: 180 ML | Refills: 5 | Status: SHIPPED | OUTPATIENT
Start: 2024-03-11

## 2024-03-11 RX ORDER — FUROSEMIDE 40 MG/1
40 TABLET ORAL DAILY
Qty: 90 TABLET | Refills: 3 | Status: SHIPPED | OUTPATIENT
Start: 2024-03-11

## 2024-03-11 RX ORDER — ATORVASTATIN CALCIUM 10 MG/1
10 TABLET, FILM COATED ORAL DAILY
Qty: 28 TABLET | Refills: 5 | Status: SHIPPED | OUTPATIENT
Start: 2024-03-11

## 2024-03-20 DIAGNOSIS — J44.9 CHRONIC OBSTRUCTIVE PULMONARY DISEASE, UNSPECIFIED COPD TYPE (HCC): ICD-10-CM

## 2024-03-20 RX ORDER — ALBUTEROL SULFATE 90 UG/1
2 AEROSOL, METERED RESPIRATORY (INHALATION) EVERY 6 HOURS PRN
Qty: 18 G | Refills: 0 | Status: SHIPPED | OUTPATIENT
Start: 2024-03-20

## 2024-03-20 RX ORDER — LINACLOTIDE 290 UG/1
CAPSULE, GELATIN COATED ORAL
Qty: 30 CAPSULE | Refills: 3 | Status: SHIPPED | OUTPATIENT
Start: 2024-03-20

## 2024-04-23 ENCOUNTER — COMMUNITY OUTREACH (OUTPATIENT)
Dept: FAMILY MEDICINE CLINIC | Age: 49
End: 2024-04-23

## 2024-05-07 DIAGNOSIS — J44.9 CHRONIC OBSTRUCTIVE PULMONARY DISEASE, UNSPECIFIED COPD TYPE (HCC): ICD-10-CM

## 2024-05-07 RX ORDER — UMECLIDINIUM 62.5 UG/1
1 AEROSOL, POWDER ORAL DAILY
Qty: 1 EACH | Refills: 1 | Status: SHIPPED | OUTPATIENT
Start: 2024-05-07

## 2024-05-07 NOTE — TELEPHONE ENCOUNTER
Pharmacy says that this is used in a hospital setting for like once weekly use, can you change it to something else and send to pt pharmacy?

## 2024-05-16 DIAGNOSIS — J44.9 CHRONIC OBSTRUCTIVE PULMONARY DISEASE, UNSPECIFIED COPD TYPE (HCC): ICD-10-CM

## 2024-05-16 RX ORDER — ALBUTEROL SULFATE 90 UG/1
2 AEROSOL, METERED RESPIRATORY (INHALATION) EVERY 6 HOURS PRN
Qty: 18 G | Refills: 0 | Status: SHIPPED | OUTPATIENT
Start: 2024-05-16

## 2024-06-12 DIAGNOSIS — J44.9 CHRONIC OBSTRUCTIVE PULMONARY DISEASE, UNSPECIFIED COPD TYPE (HCC): ICD-10-CM

## 2024-06-12 DIAGNOSIS — K21.9 CHRONIC GERD: ICD-10-CM

## 2024-06-12 RX ORDER — UMECLIDINIUM 62.5 UG/1
1 AEROSOL, POWDER ORAL DAILY
Qty: 30 EACH | Refills: 3 | Status: SHIPPED | OUTPATIENT
Start: 2024-06-12

## 2024-06-12 RX ORDER — ALBUTEROL SULFATE 90 UG/1
2 AEROSOL, METERED RESPIRATORY (INHALATION) EVERY 6 HOURS PRN
Qty: 18 G | Refills: 3 | Status: SHIPPED | OUTPATIENT
Start: 2024-06-12

## 2024-06-12 RX ORDER — OMEPRAZOLE 40 MG/1
CAPSULE, DELAYED RELEASE ORAL
Qty: 56 CAPSULE | Refills: 3 | Status: SHIPPED | OUTPATIENT
Start: 2024-06-12

## 2024-06-20 RX ORDER — LINACLOTIDE 290 UG/1
CAPSULE, GELATIN COATED ORAL
Qty: 90 CAPSULE | Refills: 3 | Status: SHIPPED | OUTPATIENT
Start: 2024-06-20

## 2024-08-06 DIAGNOSIS — E78.2 MIXED HYPERLIPIDEMIA: ICD-10-CM

## 2024-08-06 RX ORDER — ATORVASTATIN CALCIUM 10 MG/1
10 TABLET, FILM COATED ORAL DAILY
Qty: 90 TABLET | Refills: 3 | Status: SHIPPED | OUTPATIENT
Start: 2024-08-06

## 2024-08-26 ENCOUNTER — TELEPHONE (OUTPATIENT)
Dept: FAMILY MEDICINE CLINIC | Age: 49
End: 2024-08-26

## 2024-08-26 NOTE — TELEPHONE ENCOUNTER
Alejandra from [facility] called and states that Lori has no showed to her appointments 4 different times and now they will no longer be able to see her.  They said after her last no show she called in and demanded another appointment and they informed her that they can no longer see her and that they will let her family doctor know.

## 2024-08-29 DIAGNOSIS — J44.9 CHRONIC OBSTRUCTIVE PULMONARY DISEASE, UNSPECIFIED COPD TYPE (HCC): ICD-10-CM

## 2024-08-29 RX ORDER — ALBUTEROL SULFATE 0.83 MG/ML
SOLUTION RESPIRATORY (INHALATION)
Qty: 180 ML | Refills: 5 | Status: SHIPPED | OUTPATIENT
Start: 2024-08-29

## 2024-08-29 NOTE — TELEPHONE ENCOUNTER
Last office visit 12/19/2023, advised to follow-up 0, next appointment 0.   Please f/u with you PCP and Dr. Farooq for pain management.

## 2024-09-12 ENCOUNTER — TELEPHONE (OUTPATIENT)
Dept: NEUROLOGY | Age: 49
End: 2024-09-12

## 2024-09-30 DIAGNOSIS — J44.9 CHRONIC OBSTRUCTIVE PULMONARY DISEASE, UNSPECIFIED COPD TYPE (HCC): ICD-10-CM

## 2024-09-30 RX ORDER — ALBUTEROL SULFATE 90 UG/1
2 INHALANT RESPIRATORY (INHALATION) EVERY 6 HOURS PRN
Qty: 18 G | Refills: 3 | Status: SHIPPED | OUTPATIENT
Start: 2024-09-30

## 2024-09-30 NOTE — TELEPHONE ENCOUNTER
Last office visit 12/19/20230, advised to follow-up 4 months, next appointment 0. Will mail appt letter

## 2024-10-28 DIAGNOSIS — J44.9 CHRONIC OBSTRUCTIVE PULMONARY DISEASE, UNSPECIFIED COPD TYPE (HCC): ICD-10-CM

## 2024-10-28 RX ORDER — UMECLIDINIUM 62.5 UG/1
1 AEROSOL, POWDER ORAL DAILY
Qty: 30 EACH | Refills: 1 | Status: SHIPPED | OUTPATIENT
Start: 2024-10-28

## 2024-10-28 NOTE — TELEPHONE ENCOUNTER
Last office visit 12/19/2023, advised to follow-up 4 months, next appointment 0. Will mail appt letter

## 2025-01-08 DIAGNOSIS — J44.9 CHRONIC OBSTRUCTIVE PULMONARY DISEASE, UNSPECIFIED COPD TYPE (HCC): ICD-10-CM

## 2025-01-08 DIAGNOSIS — J06.9 UPPER RESPIRATORY TRACT INFECTION, UNSPECIFIED TYPE: ICD-10-CM

## 2025-01-08 RX ORDER — UMECLIDINIUM 62.5 UG/1
1 AEROSOL, POWDER ORAL DAILY
Qty: 30 EACH | Refills: 1 | OUTPATIENT
Start: 2025-01-08

## 2025-01-08 RX ORDER — AZELASTINE HYDROCHLORIDE 137 UG/1
SPRAY, METERED NASAL
Qty: 30 ML | OUTPATIENT
Start: 2025-01-08

## 2025-01-08 NOTE — TELEPHONE ENCOUNTER
This patient has NOT had visit since 2023.     Pt should call office to schedule next follow up appointment and UPDATE ALL INFORMATION to include phone #    [phone] (Mobile) # dc [phone] (Home Phone) KATIUSKA CONCEPCION (Unknown) LM TO CALL BACK FOR APPT       Future appt scheduled 0 appt scheduled  Return in about 4 months (around 4/19/2024).                           Last appt 12/19/2023      Last Written  10/28/2024    INCRUSE ELLIPTA 62.5 MCG/ACT inhaler   #30  1 RF

## 2025-01-09 DIAGNOSIS — J06.9 UPPER RESPIRATORY TRACT INFECTION, UNSPECIFIED TYPE: ICD-10-CM

## 2025-01-09 RX ORDER — AZELASTINE HYDROCHLORIDE 137 UG/1
SPRAY, METERED NASAL
Qty: 30 ML | OUTPATIENT
Start: 2025-01-09

## 2025-01-30 DIAGNOSIS — J44.9 CHRONIC OBSTRUCTIVE PULMONARY DISEASE, UNSPECIFIED COPD TYPE (HCC): ICD-10-CM

## 2025-01-30 RX ORDER — ALBUTEROL SULFATE 90 UG/1
2 INHALANT RESPIRATORY (INHALATION) EVERY 6 HOURS PRN
Qty: 18 G | Refills: 3 | OUTPATIENT
Start: 2025-01-30

## 2025-01-30 RX ORDER — BUDESONIDE AND FORMOTEROL FUMARATE DIHYDRATE 160; 4.5 UG/1; UG/1
AEROSOL RESPIRATORY (INHALATION)
Qty: 10.2 G | Refills: 11 | OUTPATIENT
Start: 2025-01-30

## 2025-05-07 DIAGNOSIS — J44.9 CHRONIC OBSTRUCTIVE PULMONARY DISEASE, UNSPECIFIED COPD TYPE (HCC): ICD-10-CM

## 2025-05-07 RX ORDER — ALBUTEROL SULFATE 0.83 MG/ML
SOLUTION RESPIRATORY (INHALATION)
Qty: 180 ML | Refills: 5 | OUTPATIENT
Start: 2025-05-07

## (undated) DEVICE — SUTURE SZ 0 27IN 5/8 CIR UR-6  TAPER PT VIOLET ABSRB VICRYL J603H

## (undated) DEVICE — CO2 INSUFFLATION NEEDLE: Brand: CORE DYNAMICS

## (undated) DEVICE — GAUZE SPONGES,8 PLY: Brand: CURITY

## (undated) DEVICE — TROCAR ENDOSCP L100MM DIA5MM BLDELSS STBL SL OBT RADLUC

## (undated) DEVICE — DRAPE,ABDOMINAL,MAJOR,STERILE: Brand: MEDLINE

## (undated) DEVICE — CIRCUIT ANES L72IN 3L BACT AND VIR FLTR EL CONN SGL LIMB

## (undated) DEVICE — MAJOR SET UP PK

## (undated) DEVICE — PUMP SUC IRR TBNG L10FT W/ HNDPC ASSEMB STRYKEFLOW 2

## (undated) DEVICE — INTENDED FOR TISSUE SEPARATION, AND OTHER PROCEDURES THAT REQUIRE A SHARP SURGICAL BLADE TO PUNCTURE OR CUT.: Brand: BARD-PARKER ® DISPOSABLE SCALPELS

## (undated) DEVICE — SAVARY CLEANING BRUSH: Brand: SAVARY

## (undated) DEVICE — SAVARY GILLIARD WIRE GUIDE: Brand: SAVARY GILLIARD

## (undated) DEVICE — YANKAUER,BULB TIP,W/O VENT,RIGID,STERILE: Brand: MEDLINE

## (undated) DEVICE — SOLUTION IV IRRIG 500ML 0.9% SODIUM CHL 2F7123

## (undated) DEVICE — 3M™ STERI-STRIP™ REINFORCED ADHESIVE SKIN CLOSURES, R1540, 1/8 IN X 3 IN (3 MM X 75 MM), 5 STRIPS/ENVELOPE: Brand: 3M™ STERI-STRIP™

## (undated) DEVICE — CONMED SCOPE SAVER BITE BLOCK, 20X27 MM: Brand: SCOPE SAVER

## (undated) DEVICE — RELOAD STPL H1-2.5X45MM VASC THN TISS WHT 6 ROW B FRM SGL

## (undated) DEVICE — ENDOSCOPIC KIT 2 12 FT OP4 DE2 GWN SYR

## (undated) DEVICE — TROCAR ENDOSCP L100MM DIA5MM BLDELSS STBL SL THRD OPT VW

## (undated) DEVICE — 3M™ STERI-STRIP™ COMPOUND BENZOIN TINCTURE 40 BAGS/CARTON 4 CARTONS/CASE C1544: Brand: 3M™ STERI-STRIP™

## (undated) DEVICE — STANDARD HYPODERMIC NEEDLE,POLYPROPYLENE HUB: Brand: MONOJECT

## (undated) DEVICE — GOWN,AURORA,NONREINF,RAGLAN,XXL,STERILE: Brand: MEDLINE

## (undated) DEVICE — TUBING, SUCTION, 3/16" X 10', STRAIGHT: Brand: MEDLINE

## (undated) DEVICE — LOOP LIG SUT SZ 0 L18IN ABSRB POLYDIOXANONE MFIL PDS II

## (undated) DEVICE — SYRINGE, LUER LOCK, 10ML: Brand: MEDLINE

## (undated) DEVICE — SUTURE VCRL SZ 4-0 L18IN ABSRB UD L19MM PS-2 3/8 CIR PRIM J496H

## (undated) DEVICE — 3M™ TEGADERM™ TRANSPARENT FILM DRESSING FRAME STYLE, 1624W, 2-3/8 IN X 2-3/4 IN (6 CM X 7 CM), 100/CT 4CT/CASE: Brand: 3M™ TEGADERM™

## (undated) DEVICE — TISSUE RETRIEVAL SYSTEM: Brand: INZII RETRIEVAL SYSTEM

## (undated) DEVICE — APPLICATOR PREP 26ML 0.7% IOD POVACRYLEX 74% ISO ALC ST

## (undated) DEVICE — GLOVE ORANGE PI 8 1/2   MSG9085

## (undated) DEVICE — CANNULA,OXY,ADULT,SUPERSOFT,W/7'TUB,SC: Brand: MEDLINE INDUSTRIES, INC.

## (undated) DEVICE — ELECTRODE PT RET AD L9FT HI MOIST COND ADH HYDRGEL CORDED

## (undated) DEVICE — TROCAR: Brand: KII FIOS FIRST ENTRY

## (undated) DEVICE — CUTTER ENDOSCP L340MM LIN ARTC SGL STROKE FIRING ENDOPATH